# Patient Record
Sex: MALE | Race: WHITE | NOT HISPANIC OR LATINO | Employment: FULL TIME | URBAN - METROPOLITAN AREA
[De-identification: names, ages, dates, MRNs, and addresses within clinical notes are randomized per-mention and may not be internally consistent; named-entity substitution may affect disease eponyms.]

---

## 2017-01-06 ENCOUNTER — TRANSCRIBE ORDERS (OUTPATIENT)
Dept: ADMINISTRATIVE | Facility: HOSPITAL | Age: 57
End: 2017-01-06

## 2017-01-06 ENCOUNTER — LAB (OUTPATIENT)
Dept: LAB | Facility: HOSPITAL | Age: 57
End: 2017-01-06
Attending: FAMILY MEDICINE

## 2017-01-06 DIAGNOSIS — Z00.00 ROUTINE GENERAL MEDICAL EXAMINATION AT A HEALTH CARE FACILITY: Primary | ICD-10-CM

## 2017-01-06 DIAGNOSIS — Z02.89 EXAMINATION, PHYSICAL, EMPLOYEE: Primary | ICD-10-CM

## 2017-01-06 PROCEDURE — 86706 HEP B SURFACE ANTIBODY: CPT

## 2017-01-06 PROCEDURE — 36415 COLL VENOUS BLD VENIPUNCTURE: CPT

## 2017-01-07 LAB — HBV SURFACE AB SER-ACNC: <3.1 MIU/ML

## 2017-02-18 ENCOUNTER — APPOINTMENT (EMERGENCY)
Dept: RADIOLOGY | Facility: HOSPITAL | Age: 57
DRG: 066 | End: 2017-02-18
Payer: COMMERCIAL

## 2017-02-18 ENCOUNTER — GENERIC CONVERSION - ENCOUNTER (OUTPATIENT)
Dept: OTHER | Facility: OTHER | Age: 57
End: 2017-02-18

## 2017-02-18 ENCOUNTER — HOSPITAL ENCOUNTER (INPATIENT)
Facility: HOSPITAL | Age: 57
LOS: 2 days | Discharge: HOME | DRG: 066 | End: 2017-02-20
Attending: EMERGENCY MEDICINE | Admitting: HOSPITALIST
Payer: COMMERCIAL

## 2017-02-18 DIAGNOSIS — G45.9 TIA (TRANSIENT ISCHEMIC ATTACK): Primary | ICD-10-CM

## 2017-02-18 LAB
ALBUMIN SERPL BCP-MCNC: 3.7 G/DL (ref 3.5–5)
ALP SERPL-CCNC: 81 U/L (ref 46–116)
ALT SERPL W P-5'-P-CCNC: 23 U/L (ref 12–78)
ANION GAP SERPL CALCULATED.3IONS-SCNC: 7 MMOL/L (ref 4–13)
APTT PPP: 26 SECONDS (ref 24–36)
AST SERPL W P-5'-P-CCNC: 20 U/L (ref 5–45)
BASOPHILS # BLD AUTO: 0 THOUSANDS/ΜL (ref 0–0.1)
BASOPHILS NFR BLD AUTO: 0 % (ref 0–1)
BILIRUB SERPL-MCNC: 0.6 MG/DL (ref 0.2–1)
BUN SERPL-MCNC: 17 MG/DL (ref 5–25)
CALCIUM SERPL-MCNC: 8.6 MG/DL (ref 8.3–10.1)
CHLORIDE SERPL-SCNC: 104 MMOL/L (ref 100–108)
CO2 SERPL-SCNC: 28 MMOL/L (ref 21–32)
CREAT SERPL-MCNC: 1.27 MG/DL (ref 0.6–1.3)
EOSINOPHIL # BLD AUTO: 0.3 THOUSAND/ΜL (ref 0–0.61)
EOSINOPHIL NFR BLD AUTO: 4 % (ref 0–6)
ERYTHROCYTE [DISTWIDTH] IN BLOOD BY AUTOMATED COUNT: 13.9 % (ref 11.6–15.1)
GFR SERPL CREATININE-BSD FRML MDRD: 58.9 ML/MIN/1.73SQ M
GLUCOSE SERPL-MCNC: 92 MG/DL (ref 65–140)
HCT VFR BLD AUTO: 45.1 % (ref 42–52)
HGB BLD-MCNC: 15.1 G/DL (ref 14–18)
INR PPP: 1.06 (ref 0.86–1.16)
LIPASE SERPL-CCNC: 463 U/L (ref 73–393)
LYMPHOCYTES # BLD AUTO: 2.2 THOUSANDS/ΜL (ref 0.6–4.47)
LYMPHOCYTES NFR BLD AUTO: 33 % (ref 14–44)
MCH RBC QN AUTO: 29.1 PG (ref 27–31)
MCHC RBC AUTO-ENTMCNC: 33.5 G/DL (ref 31.4–37.4)
MCV RBC AUTO: 87 FL (ref 82–98)
MONOCYTES # BLD AUTO: 0.8 THOUSAND/ΜL (ref 0.17–1.22)
MONOCYTES NFR BLD AUTO: 13 % (ref 4–12)
NEUTROPHILS # BLD AUTO: 3.2 THOUSANDS/ΜL (ref 1.85–7.62)
NEUTS SEG NFR BLD AUTO: 49 % (ref 43–75)
NRBC BLD AUTO-RTO: 0 /100 WBCS
PLATELET # BLD AUTO: 193 THOUSANDS/UL (ref 130–400)
PMV BLD AUTO: 7.7 FL (ref 8.9–12.7)
POTASSIUM SERPL-SCNC: 4.1 MMOL/L (ref 3.5–5.3)
PROT SERPL-MCNC: 6.7 G/DL (ref 6.4–8.2)
PROTHROMBIN TIME: 11.1 SECONDS (ref 9.4–11.7)
RBC # BLD AUTO: 5.2 MILLION/UL (ref 4.7–6.1)
SODIUM SERPL-SCNC: 139 MMOL/L (ref 136–145)
TROPONIN I SERPL-MCNC: <0.02 NG/ML
WBC # BLD AUTO: 6.5 THOUSAND/UL (ref 4.8–10.8)

## 2017-02-18 PROCEDURE — 80053 COMPREHEN METABOLIC PANEL: CPT | Performed by: EMERGENCY MEDICINE

## 2017-02-18 PROCEDURE — 93005 ELECTROCARDIOGRAM TRACING: CPT | Performed by: EMERGENCY MEDICINE

## 2017-02-18 PROCEDURE — 70450 CT HEAD/BRAIN W/O DYE: CPT

## 2017-02-18 PROCEDURE — 83690 ASSAY OF LIPASE: CPT | Performed by: EMERGENCY MEDICINE

## 2017-02-18 PROCEDURE — 36415 COLL VENOUS BLD VENIPUNCTURE: CPT | Performed by: EMERGENCY MEDICINE

## 2017-02-18 PROCEDURE — 84484 ASSAY OF TROPONIN QUANT: CPT | Performed by: EMERGENCY MEDICINE

## 2017-02-18 PROCEDURE — 85610 PROTHROMBIN TIME: CPT | Performed by: EMERGENCY MEDICINE

## 2017-02-18 PROCEDURE — 85025 COMPLETE CBC W/AUTO DIFF WBC: CPT | Performed by: EMERGENCY MEDICINE

## 2017-02-18 PROCEDURE — 85730 THROMBOPLASTIN TIME PARTIAL: CPT | Performed by: EMERGENCY MEDICINE

## 2017-02-18 RX ORDER — ASPIRIN 325 MG
325 TABLET, DELAYED RELEASE (ENTERIC COATED) ORAL ONCE
Status: COMPLETED | OUTPATIENT
Start: 2017-02-19 | End: 2017-02-19

## 2017-02-18 RX ORDER — CLOPIDOGREL BISULFATE 75 MG/1
75 TABLET ORAL ONCE
Status: COMPLETED | OUTPATIENT
Start: 2017-02-19 | End: 2017-02-19

## 2017-02-18 RX ADMIN — SODIUM CHLORIDE 500 ML: 0.9 INJECTION, SOLUTION INTRAVENOUS at 23:21

## 2017-02-19 ENCOUNTER — APPOINTMENT (INPATIENT)
Dept: RADIOLOGY | Facility: HOSPITAL | Age: 57
DRG: 066 | End: 2017-02-19
Payer: COMMERCIAL

## 2017-02-19 PROBLEM — G45.9 TIA (TRANSIENT ISCHEMIC ATTACK): Status: ACTIVE | Noted: 2017-02-19

## 2017-02-19 PROBLEM — IMO0001 ELEVATED BP: Status: ACTIVE | Noted: 2017-02-19

## 2017-02-19 PROBLEM — R29.898 WEAKNESS OF HAND: Status: ACTIVE | Noted: 2017-02-19

## 2017-02-19 PROBLEM — D86.9 SARCOIDOSIS: Status: ACTIVE | Noted: 2017-02-19

## 2017-02-19 PROBLEM — N18.9 CKD (CHRONIC KIDNEY DISEASE): Status: ACTIVE | Noted: 2017-02-19

## 2017-02-19 LAB
ALBUMIN SERPL BCP-MCNC: 3.2 G/DL (ref 3.5–5)
ALP SERPL-CCNC: 64 U/L (ref 46–116)
ALT SERPL W P-5'-P-CCNC: 19 U/L (ref 12–78)
ANION GAP SERPL CALCULATED.3IONS-SCNC: 8 MMOL/L (ref 4–13)
AST SERPL W P-5'-P-CCNC: 13 U/L (ref 5–45)
BASOPHILS # BLD AUTO: 0 THOUSANDS/ΜL (ref 0–0.1)
BASOPHILS NFR BLD AUTO: 1 % (ref 0–1)
BILIRUB SERPL-MCNC: 0.5 MG/DL (ref 0.2–1)
BILIRUB UR QL STRIP: NEGATIVE
BUN SERPL-MCNC: 15 MG/DL (ref 5–25)
CALCIUM SERPL-MCNC: 8.1 MG/DL (ref 8.3–10.1)
CHLORIDE SERPL-SCNC: 106 MMOL/L (ref 100–108)
CHOLEST SERPL-MCNC: 136 MG/DL (ref 50–200)
CLARITY UR: CLEAR
CO2 SERPL-SCNC: 26 MMOL/L (ref 21–32)
COLOR UR: YELLOW
CREAT SERPL-MCNC: 1.21 MG/DL (ref 0.6–1.3)
EOSINOPHIL # BLD AUTO: 0.3 THOUSAND/ΜL (ref 0–0.61)
EOSINOPHIL NFR BLD AUTO: 5 % (ref 0–6)
ERYTHROCYTE [DISTWIDTH] IN BLOOD BY AUTOMATED COUNT: 13.9 % (ref 11.6–15.1)
GFR SERPL CREATININE-BSD FRML MDRD: >60 ML/MIN/1.73SQ M
GLUCOSE SERPL-MCNC: 87 MG/DL (ref 65–140)
GLUCOSE UR STRIP-MCNC: NEGATIVE MG/DL
HCT VFR BLD AUTO: 40.9 % (ref 42–52)
HDLC SERPL-MCNC: 40 MG/DL (ref 40–60)
HGB BLD-MCNC: 13.7 G/DL (ref 14–18)
HGB UR QL STRIP.AUTO: NEGATIVE
KETONES UR STRIP-MCNC: NEGATIVE MG/DL
LDLC SERPL CALC-MCNC: 86 MG/DL (ref 0–100)
LEUKOCYTE ESTERASE UR QL STRIP: NEGATIVE
LYMPHOCYTES # BLD AUTO: 1.6 THOUSANDS/ΜL (ref 0.6–4.47)
LYMPHOCYTES NFR BLD AUTO: 29 % (ref 14–44)
MCH RBC QN AUTO: 29 PG (ref 27–31)
MCHC RBC AUTO-ENTMCNC: 33.4 G/DL (ref 31.4–37.4)
MCV RBC AUTO: 87 FL (ref 82–98)
MONOCYTES # BLD AUTO: 0.7 THOUSAND/ΜL (ref 0.17–1.22)
MONOCYTES NFR BLD AUTO: 13 % (ref 4–12)
NEUTROPHILS # BLD AUTO: 3 THOUSANDS/ΜL (ref 1.85–7.62)
NEUTS SEG NFR BLD AUTO: 53 % (ref 43–75)
NITRITE UR QL STRIP: NEGATIVE
NRBC BLD AUTO-RTO: 0 /100 WBCS
PH UR STRIP.AUTO: 6 [PH] (ref 5–9)
PLATELET # BLD AUTO: 163 THOUSANDS/UL (ref 130–400)
PMV BLD AUTO: 8.1 FL (ref 8.9–12.7)
POTASSIUM SERPL-SCNC: 3.8 MMOL/L (ref 3.5–5.3)
PROT SERPL-MCNC: 5.8 G/DL (ref 6.4–8.2)
PROT UR STRIP-MCNC: NEGATIVE MG/DL
RBC # BLD AUTO: 4.71 MILLION/UL (ref 4.7–6.1)
SODIUM SERPL-SCNC: 140 MMOL/L (ref 136–145)
SP GR UR STRIP.AUTO: 1.01 (ref 1–1.03)
TRIGL SERPL-MCNC: 51 MG/DL
TSH SERPL DL<=0.05 MIU/L-ACNC: 2.74 UIU/ML (ref 0.36–3.74)
UROBILINOGEN UR QL STRIP.AUTO: 0.2 E.U./DL
WBC # BLD AUTO: 5.6 THOUSAND/UL (ref 4.8–10.8)

## 2017-02-19 PROCEDURE — 99285 EMERGENCY DEPT VISIT HI MDM: CPT

## 2017-02-19 PROCEDURE — 85025 COMPLETE CBC W/AUTO DIFF WBC: CPT | Performed by: HOSPITALIST

## 2017-02-19 PROCEDURE — G8984 CARRY CURRENT STATUS: HCPCS

## 2017-02-19 PROCEDURE — 84443 ASSAY THYROID STIM HORMONE: CPT | Performed by: HOSPITALIST

## 2017-02-19 PROCEDURE — 70498 CT ANGIOGRAPHY NECK: CPT

## 2017-02-19 PROCEDURE — 70496 CT ANGIOGRAPHY HEAD: CPT

## 2017-02-19 PROCEDURE — 81003 URINALYSIS AUTO W/O SCOPE: CPT | Performed by: EMERGENCY MEDICINE

## 2017-02-19 PROCEDURE — G8985 CARRY GOAL STATUS: HCPCS

## 2017-02-19 PROCEDURE — 87081 CULTURE SCREEN ONLY: CPT | Performed by: HOSPITALIST

## 2017-02-19 PROCEDURE — 97161 PT EVAL LOW COMPLEX 20 MIN: CPT

## 2017-02-19 PROCEDURE — 80053 COMPREHEN METABOLIC PANEL: CPT | Performed by: HOSPITALIST

## 2017-02-19 PROCEDURE — 80061 LIPID PANEL: CPT | Performed by: HOSPITALIST

## 2017-02-19 RX ORDER — ACETAMINOPHEN 325 MG/1
650 TABLET ORAL EVERY 6 HOURS PRN
Status: DISCONTINUED | OUTPATIENT
Start: 2017-02-19 | End: 2017-02-20 | Stop reason: HOSPADM

## 2017-02-19 RX ORDER — ATORVASTATIN CALCIUM 40 MG/1
40 TABLET, FILM COATED ORAL
Status: DISCONTINUED | OUTPATIENT
Start: 2017-02-19 | End: 2017-02-20 | Stop reason: HOSPADM

## 2017-02-19 RX ADMIN — ATORVASTATIN CALCIUM 40 MG: 40 TABLET, FILM COATED ORAL at 16:51

## 2017-02-19 RX ADMIN — ENOXAPARIN SODIUM 40 MG: 40 INJECTION SUBCUTANEOUS at 14:33

## 2017-02-19 RX ADMIN — ASPIRIN 325 MG: 325 TABLET, DELAYED RELEASE ORAL at 00:01

## 2017-02-19 RX ADMIN — CLOPIDOGREL BISULFATE 75 MG: 75 TABLET ORAL at 00:01

## 2017-02-19 RX ADMIN — IOHEXOL 85 ML: 350 INJECTION, SOLUTION INTRAVENOUS at 16:04

## 2017-02-19 RX ADMIN — ACETAMINOPHEN 650 MG: 325 TABLET, FILM COATED ORAL at 23:11

## 2017-02-20 ENCOUNTER — APPOINTMENT (INPATIENT)
Dept: NON INVASIVE DIAGNOSTICS | Facility: HOSPITAL | Age: 57
DRG: 066 | End: 2017-02-20
Payer: COMMERCIAL

## 2017-02-20 ENCOUNTER — APPOINTMENT (INPATIENT)
Dept: RADIOLOGY | Facility: HOSPITAL | Age: 57
DRG: 066 | End: 2017-02-20
Payer: COMMERCIAL

## 2017-02-20 ENCOUNTER — GENERIC CONVERSION - ENCOUNTER (OUTPATIENT)
Dept: OTHER | Facility: OTHER | Age: 57
End: 2017-02-20

## 2017-02-20 VITALS
BODY MASS INDEX: 25.05 KG/M2 | WEIGHT: 184.97 LBS | TEMPERATURE: 98.3 F | RESPIRATION RATE: 18 BRPM | OXYGEN SATURATION: 96 % | SYSTOLIC BLOOD PRESSURE: 137 MMHG | HEIGHT: 72 IN | DIASTOLIC BLOOD PRESSURE: 72 MMHG | HEART RATE: 63 BPM

## 2017-02-20 LAB
ANION GAP SERPL CALCULATED.3IONS-SCNC: 5 MMOL/L (ref 4–13)
BUN SERPL-MCNC: 12 MG/DL (ref 5–25)
CALCIUM SERPL-MCNC: 8.3 MG/DL (ref 8.3–10.1)
CHLORIDE SERPL-SCNC: 105 MMOL/L (ref 100–108)
CO2 SERPL-SCNC: 29 MMOL/L (ref 21–32)
CREAT SERPL-MCNC: 1.21 MG/DL (ref 0.6–1.3)
EST. AVERAGE GLUCOSE BLD GHB EST-MCNC: 103 MG/DL
GFR SERPL CREATININE-BSD FRML MDRD: >60 ML/MIN/1.73SQ M
GLUCOSE SERPL-MCNC: 93 MG/DL (ref 65–140)
HBA1C MFR BLD: 5.2 % (ref 4.2–6.3)
MRSA NOSE QL CULT: NORMAL
POTASSIUM SERPL-SCNC: 3.6 MMOL/L (ref 3.5–5.3)
SODIUM SERPL-SCNC: 139 MMOL/L (ref 136–145)

## 2017-02-20 PROCEDURE — G8987 SELF CARE CURRENT STATUS: HCPCS

## 2017-02-20 PROCEDURE — 97165 OT EVAL LOW COMPLEX 30 MIN: CPT

## 2017-02-20 PROCEDURE — 83036 HEMOGLOBIN GLYCOSYLATED A1C: CPT | Performed by: FAMILY MEDICINE

## 2017-02-20 PROCEDURE — G8988 SELF CARE GOAL STATUS: HCPCS

## 2017-02-20 PROCEDURE — 93306 TTE W/DOPPLER COMPLETE: CPT

## 2017-02-20 PROCEDURE — 80048 BASIC METABOLIC PNL TOTAL CA: CPT | Performed by: FAMILY MEDICINE

## 2017-02-20 PROCEDURE — 97530 THERAPEUTIC ACTIVITIES: CPT

## 2017-02-20 PROCEDURE — 70551 MRI BRAIN STEM W/O DYE: CPT

## 2017-02-20 RX ORDER — ASPIRIN 81 MG/1
81 TABLET, CHEWABLE ORAL DAILY
Qty: 30 TABLET | Refills: 0 | Status: ON HOLD | OUTPATIENT
Start: 2017-02-20 | End: 2017-07-05

## 2017-02-20 RX ORDER — ATORVASTATIN CALCIUM 40 MG/1
40 TABLET, FILM COATED ORAL
Qty: 30 TABLET | Refills: 0 | Status: ON HOLD | OUTPATIENT
Start: 2017-02-20 | End: 2017-07-05

## 2017-02-20 RX ADMIN — ATORVASTATIN CALCIUM 40 MG: 40 TABLET, FILM COATED ORAL at 16:38

## 2017-02-20 RX ADMIN — ENOXAPARIN SODIUM 40 MG: 40 INJECTION SUBCUTANEOUS at 09:13

## 2017-02-21 ENCOUNTER — APPOINTMENT (OUTPATIENT)
Dept: OCCUPATIONAL THERAPY | Facility: CLINIC | Age: 57
End: 2017-02-21
Payer: COMMERCIAL

## 2017-02-21 ENCOUNTER — GENERIC CONVERSION - ENCOUNTER (OUTPATIENT)
Dept: OTHER | Facility: OTHER | Age: 57
End: 2017-02-21

## 2017-02-21 PROCEDURE — 97165 OT EVAL LOW COMPLEX 30 MIN: CPT

## 2017-02-22 ENCOUNTER — GENERIC CONVERSION - ENCOUNTER (OUTPATIENT)
Dept: OTHER | Facility: OTHER | Age: 57
End: 2017-02-22

## 2017-02-22 LAB
ATRIAL RATE: 87 BPM
P AXIS: 53 DEGREES
PR INTERVAL: 168 MS
QRS AXIS: -10 DEGREES
QRSD INTERVAL: 100 MS
QT INTERVAL: 352 MS
QTC INTERVAL: 423 MS
T WAVE AXIS: 9 DEGREES
VENTRICULAR RATE: 87 BPM

## 2017-02-27 ENCOUNTER — ALLSCRIPTS OFFICE VISIT (OUTPATIENT)
Dept: OTHER | Facility: OTHER | Age: 57
End: 2017-02-27

## 2017-02-27 DIAGNOSIS — D86.9 SARCOIDOSIS: ICD-10-CM

## 2017-02-27 DIAGNOSIS — I49.8 OTHER SPECIFIED CARDIAC ARRHYTHMIAS: ICD-10-CM

## 2017-02-27 DIAGNOSIS — I63.9 CEREBRAL INFARCTION (HCC): ICD-10-CM

## 2017-02-28 ENCOUNTER — APPOINTMENT (OUTPATIENT)
Dept: OCCUPATIONAL THERAPY | Facility: CLINIC | Age: 57
End: 2017-02-28
Payer: COMMERCIAL

## 2017-02-28 PROCEDURE — 97110 THERAPEUTIC EXERCISES: CPT

## 2017-03-07 ENCOUNTER — ALLSCRIPTS OFFICE VISIT (OUTPATIENT)
Dept: OTHER | Facility: OTHER | Age: 57
End: 2017-03-07

## 2017-03-07 ENCOUNTER — APPOINTMENT (OUTPATIENT)
Dept: OCCUPATIONAL THERAPY | Facility: CLINIC | Age: 57
End: 2017-03-07
Payer: COMMERCIAL

## 2017-03-07 PROCEDURE — 97110 THERAPEUTIC EXERCISES: CPT

## 2017-03-08 ENCOUNTER — HOSPITAL ENCOUNTER (OUTPATIENT)
Dept: NON INVASIVE DIAGNOSTICS | Facility: CLINIC | Age: 57
Discharge: HOME | End: 2017-03-08
Payer: COMMERCIAL

## 2017-03-08 DIAGNOSIS — I63.9 CEREBRAL INFARCTION (HCC): ICD-10-CM

## 2017-03-08 PROCEDURE — 93226 XTRNL ECG REC<48 HR SCAN A/R: CPT

## 2017-03-08 PROCEDURE — 93225 XTRNL ECG REC<48 HRS REC: CPT

## 2017-03-10 ENCOUNTER — GENERIC CONVERSION - ENCOUNTER (OUTPATIENT)
Dept: OTHER | Facility: OTHER | Age: 57
End: 2017-03-10

## 2017-03-14 ENCOUNTER — APPOINTMENT (OUTPATIENT)
Dept: OCCUPATIONAL THERAPY | Facility: CLINIC | Age: 57
End: 2017-03-14
Payer: COMMERCIAL

## 2017-03-15 ENCOUNTER — ALLSCRIPTS OFFICE VISIT (OUTPATIENT)
Dept: OTHER | Facility: OTHER | Age: 57
End: 2017-03-15

## 2017-03-21 ENCOUNTER — APPOINTMENT (OUTPATIENT)
Dept: OCCUPATIONAL THERAPY | Facility: CLINIC | Age: 57
End: 2017-03-21
Payer: COMMERCIAL

## 2017-03-23 ENCOUNTER — TELEPHONE (OUTPATIENT)
Dept: SURGERY | Facility: HOSPITAL | Age: 57
End: 2017-03-23

## 2017-03-24 ENCOUNTER — HOSPITAL ENCOUNTER (OUTPATIENT)
Dept: NON INVASIVE DIAGNOSTICS | Facility: HOSPITAL | Age: 57
Discharge: HOME | End: 2017-03-24
Attending: INTERNAL MEDICINE
Payer: COMMERCIAL

## 2017-03-24 ENCOUNTER — ANESTHESIA (OUTPATIENT)
Dept: NON INVASIVE DIAGNOSTICS | Facility: HOSPITAL | Age: 57
End: 2017-03-24
Payer: COMMERCIAL

## 2017-03-24 ENCOUNTER — HOSPITAL ENCOUNTER (OUTPATIENT)
Dept: NON INVASIVE DIAGNOSTICS | Facility: HOSPITAL | Age: 57
Discharge: HOME | End: 2017-03-24
Attending: INTERNAL MEDICINE | Admitting: INTERNAL MEDICINE
Payer: COMMERCIAL

## 2017-03-24 ENCOUNTER — ANESTHESIA EVENT (OUTPATIENT)
Dept: NON INVASIVE DIAGNOSTICS | Facility: HOSPITAL | Age: 57
End: 2017-03-24
Payer: COMMERCIAL

## 2017-03-24 VITALS
DIASTOLIC BLOOD PRESSURE: 58 MMHG | TEMPERATURE: 98.1 F | OXYGEN SATURATION: 94 % | WEIGHT: 180 LBS | BODY MASS INDEX: 24.38 KG/M2 | HEIGHT: 72 IN | SYSTOLIC BLOOD PRESSURE: 122 MMHG | HEART RATE: 79 BPM | RESPIRATION RATE: 18 BRPM

## 2017-03-24 VITALS
SYSTOLIC BLOOD PRESSURE: 114 MMHG | TEMPERATURE: 97.2 F | OXYGEN SATURATION: 99 % | RESPIRATION RATE: 18 BRPM | DIASTOLIC BLOOD PRESSURE: 62 MMHG | HEART RATE: 83 BPM

## 2017-03-24 DIAGNOSIS — D86.9 SARCOIDOSIS: ICD-10-CM

## 2017-03-24 DIAGNOSIS — I49.8 OTHER SPECIFIED CARDIAC ARRHYTHMIAS: ICD-10-CM

## 2017-03-24 DIAGNOSIS — I63.9 CEREBRAL INFARCTION (HCC): ICD-10-CM

## 2017-03-24 DIAGNOSIS — I63.9 IMPENDING CEREBROVASCULAR ACCIDENT (HCC): ICD-10-CM

## 2017-03-24 LAB
ANION GAP SERPL CALCULATED.3IONS-SCNC: 6 MMOL/L (ref 4–13)
BUN SERPL-MCNC: 25 MG/DL (ref 5–25)
CALCIUM SERPL-MCNC: 9.1 MG/DL (ref 8.3–10.1)
CHLORIDE SERPL-SCNC: 105 MMOL/L (ref 100–108)
CO2 SERPL-SCNC: 29 MMOL/L (ref 21–32)
CREAT SERPL-MCNC: 1.15 MG/DL (ref 0.6–1.3)
ERYTHROCYTE [DISTWIDTH] IN BLOOD BY AUTOMATED COUNT: 13.3 % (ref 11.6–15.1)
GFR SERPL CREATININE-BSD FRML MDRD: >60 ML/MIN/1.73SQ M
GLUCOSE P FAST SERPL-MCNC: 92 MG/DL (ref 65–99)
GLUCOSE SERPL-MCNC: 92 MG/DL (ref 65–140)
HCT VFR BLD AUTO: 43.5 % (ref 36.5–49.3)
HGB BLD-MCNC: 15.1 G/DL (ref 12–17)
MCH RBC QN AUTO: 30.2 PG (ref 26.8–34.3)
MCHC RBC AUTO-ENTMCNC: 34.7 G/DL (ref 31.4–37.4)
MCV RBC AUTO: 87 FL (ref 82–98)
PLATELET # BLD AUTO: 193 THOUSANDS/UL (ref 149–390)
PMV BLD AUTO: 10.7 FL (ref 8.9–12.7)
POTASSIUM SERPL-SCNC: 4.5 MMOL/L (ref 3.5–5.3)
RBC # BLD AUTO: 5 MILLION/UL (ref 3.88–5.62)
SODIUM SERPL-SCNC: 140 MMOL/L (ref 136–145)
WBC # BLD AUTO: 5.36 THOUSAND/UL (ref 4.31–10.16)

## 2017-03-24 PROCEDURE — 80048 BASIC METABOLIC PNL TOTAL CA: CPT | Performed by: PHYSICIAN ASSISTANT

## 2017-03-24 PROCEDURE — 33282 HB IMPLANT PAT-ACTIVE HT RECORD: CPT | Performed by: INTERNAL MEDICINE

## 2017-03-24 PROCEDURE — C1764 EVENT RECORDER, CARDIAC: HCPCS

## 2017-03-24 PROCEDURE — 93312 ECHO TRANSESOPHAGEAL: CPT

## 2017-03-24 PROCEDURE — 85027 COMPLETE CBC AUTOMATED: CPT | Performed by: PHYSICIAN ASSISTANT

## 2017-03-24 RX ORDER — METOPROLOL SUCCINATE 25 MG/1
25 TABLET, EXTENDED RELEASE ORAL DAILY
COMMUNITY
End: 2018-06-29

## 2017-03-24 RX ORDER — FENTANYL CITRATE 50 UG/ML
INJECTION, SOLUTION INTRAMUSCULAR; INTRAVENOUS AS NEEDED
Status: DISCONTINUED | OUTPATIENT
Start: 2017-03-24 | End: 2017-03-24 | Stop reason: SURG

## 2017-03-24 RX ORDER — PROPOFOL 10 MG/ML
INJECTION, EMULSION INTRAVENOUS CONTINUOUS PRN
Status: DISCONTINUED | OUTPATIENT
Start: 2017-03-24 | End: 2017-03-24 | Stop reason: SURG

## 2017-03-24 RX ORDER — ACETAMINOPHEN 325 MG/1
650 TABLET ORAL EVERY 4 HOURS PRN
Status: DISCONTINUED | OUTPATIENT
Start: 2017-03-24 | End: 2017-03-24 | Stop reason: HOSPADM

## 2017-03-24 RX ORDER — PROPOFOL 10 MG/ML
INJECTION, EMULSION INTRAVENOUS AS NEEDED
Status: DISCONTINUED | OUTPATIENT
Start: 2017-03-24 | End: 2017-03-24 | Stop reason: SURG

## 2017-03-24 RX ORDER — GLYCOPYRROLATE 0.2 MG/ML
INJECTION INTRAMUSCULAR; INTRAVENOUS AS NEEDED
Status: DISCONTINUED | OUTPATIENT
Start: 2017-03-24 | End: 2017-03-24 | Stop reason: SURG

## 2017-03-24 RX ORDER — LIDOCAINE HYDROCHLORIDE 10 MG/ML
INJECTION, SOLUTION INFILTRATION; PERINEURAL CODE/TRAUMA/SEDATION MEDICATION
Status: COMPLETED | OUTPATIENT
Start: 2017-03-24 | End: 2017-03-24

## 2017-03-24 RX ORDER — CLOPIDOGREL BISULFATE 75 MG/1
75 TABLET ORAL DAILY
Status: ON HOLD | COMMUNITY
End: 2017-05-25 | Stop reason: ALTCHOICE

## 2017-03-24 RX ORDER — SODIUM CHLORIDE 9 MG/ML
50 INJECTION, SOLUTION INTRAVENOUS CONTINUOUS
Status: DISCONTINUED | OUTPATIENT
Start: 2017-03-24 | End: 2017-03-24 | Stop reason: HOSPADM

## 2017-03-24 RX ADMIN — SODIUM CHLORIDE 50 ML/HR: 0.9 INJECTION, SOLUTION INTRAVENOUS at 12:27

## 2017-03-24 RX ADMIN — FENTANYL CITRATE 50 MCG: 50 INJECTION, SOLUTION INTRAMUSCULAR; INTRAVENOUS at 14:34

## 2017-03-24 RX ADMIN — LIDOCAINE HYDROCHLORIDE 15 ML: 10 INJECTION, SOLUTION INFILTRATION; PERINEURAL at 15:02

## 2017-03-24 RX ADMIN — PROPOFOL 100 MG: 10 INJECTION, EMULSION INTRAVENOUS at 14:34

## 2017-03-24 RX ADMIN — PROPOFOL 150 MCG/KG/MIN: 10 INJECTION, EMULSION INTRAVENOUS at 14:34

## 2017-03-24 RX ADMIN — GLYCOPYRROLATE 0.2 MG: 0.2 INJECTION INTRAMUSCULAR; INTRAVENOUS at 14:25

## 2017-03-24 RX ADMIN — FENTANYL CITRATE 50 MCG: 50 INJECTION, SOLUTION INTRAMUSCULAR; INTRAVENOUS at 14:25

## 2017-03-28 ENCOUNTER — APPOINTMENT (OUTPATIENT)
Dept: OCCUPATIONAL THERAPY | Facility: CLINIC | Age: 57
End: 2017-03-28
Payer: COMMERCIAL

## 2017-03-29 ENCOUNTER — ALLSCRIPTS OFFICE VISIT (OUTPATIENT)
Dept: OTHER | Facility: OTHER | Age: 57
End: 2017-03-29

## 2017-04-10 ENCOUNTER — ALLSCRIPTS OFFICE VISIT (OUTPATIENT)
Dept: OTHER | Facility: OTHER | Age: 57
End: 2017-04-10

## 2017-05-11 ENCOUNTER — GENERIC CONVERSION - ENCOUNTER (OUTPATIENT)
Dept: OTHER | Facility: OTHER | Age: 57
End: 2017-05-11

## 2017-05-19 ENCOUNTER — ALLSCRIPTS OFFICE VISIT (OUTPATIENT)
Dept: OTHER | Facility: OTHER | Age: 57
End: 2017-05-19

## 2017-05-19 DIAGNOSIS — I34.1 NONRHEUMATIC MITRAL VALVE PROLAPSE: ICD-10-CM

## 2017-05-19 DIAGNOSIS — I34.0 NONRHEUMATIC MITRAL VALVE INSUFFICIENCY: ICD-10-CM

## 2017-05-24 PROBLEM — I34.0 MODERATE MITRAL REGURGITATION: Status: ACTIVE | Noted: 2017-05-24

## 2017-05-25 ENCOUNTER — ANESTHESIA (OUTPATIENT)
Dept: NON INVASIVE DIAGNOSTICS | Facility: HOSPITAL | Age: 57
End: 2017-05-25
Payer: COMMERCIAL

## 2017-05-25 ENCOUNTER — ANESTHESIA EVENT (OUTPATIENT)
Dept: NON INVASIVE DIAGNOSTICS | Facility: HOSPITAL | Age: 57
End: 2017-05-25
Payer: COMMERCIAL

## 2017-05-25 ENCOUNTER — GENERIC CONVERSION - ENCOUNTER (OUTPATIENT)
Dept: OTHER | Facility: OTHER | Age: 57
End: 2017-05-25

## 2017-05-25 ENCOUNTER — HOSPITAL ENCOUNTER (OUTPATIENT)
Dept: NON INVASIVE DIAGNOSTICS | Facility: HOSPITAL | Age: 57
Discharge: HOME | End: 2017-05-25
Attending: INTERNAL MEDICINE | Admitting: INTERNAL MEDICINE
Payer: COMMERCIAL

## 2017-05-25 ENCOUNTER — HOSPITAL ENCOUNTER (OUTPATIENT)
Dept: NON INVASIVE DIAGNOSTICS | Facility: HOSPITAL | Age: 57
Discharge: HOME | End: 2017-05-25
Payer: COMMERCIAL

## 2017-05-25 VITALS
SYSTOLIC BLOOD PRESSURE: 124 MMHG | WEIGHT: 182 LBS | HEIGHT: 72 IN | TEMPERATURE: 98.5 F | DIASTOLIC BLOOD PRESSURE: 74 MMHG | BODY MASS INDEX: 24.65 KG/M2 | OXYGEN SATURATION: 94 % | HEART RATE: 88 BPM | RESPIRATION RATE: 18 BRPM

## 2017-05-25 DIAGNOSIS — I34.1 NONRHEUMATIC MITRAL VALVE PROLAPSE: ICD-10-CM

## 2017-05-25 DIAGNOSIS — I34.0 MODERATE MITRAL REGURGITATION: Primary | ICD-10-CM

## 2017-05-25 DIAGNOSIS — I34.0 NONRHEUMATIC MITRAL VALVE INSUFFICIENCY: ICD-10-CM

## 2017-05-25 LAB
ANION GAP SERPL CALCULATED.3IONS-SCNC: 3 MMOL/L (ref 4–13)
ATRIAL RATE: 63 BPM
BUN SERPL-MCNC: 16 MG/DL (ref 5–25)
CALCIUM SERPL-MCNC: 8.9 MG/DL (ref 8.3–10.1)
CHLORIDE SERPL-SCNC: 104 MMOL/L (ref 100–108)
CHOLEST SERPL-MCNC: 157 MG/DL (ref 50–200)
CO2 SERPL-SCNC: 31 MMOL/L (ref 21–32)
CREAT SERPL-MCNC: 1.15 MG/DL (ref 0.6–1.3)
ERYTHROCYTE [DISTWIDTH] IN BLOOD BY AUTOMATED COUNT: 12.8 % (ref 11.6–15.1)
GFR SERPL CREATININE-BSD FRML MDRD: >60 ML/MIN/1.73SQ M
GLUCOSE P FAST SERPL-MCNC: 89 MG/DL (ref 65–99)
GLUCOSE SERPL-MCNC: 89 MG/DL (ref 65–140)
HCT VFR BLD AUTO: 43.1 % (ref 36.5–49.3)
HDLC SERPL-MCNC: 42 MG/DL (ref 40–60)
HGB BLD-MCNC: 15 G/DL (ref 12–17)
INR PPP: 1.08 (ref 0.86–1.16)
LDLC SERPL CALC-MCNC: 97 MG/DL (ref 0–100)
MAGNESIUM SERPL-MCNC: 2 MG/DL (ref 1.6–2.6)
MCH RBC QN AUTO: 30.7 PG (ref 26.8–34.3)
MCHC RBC AUTO-ENTMCNC: 34.8 G/DL (ref 31.4–37.4)
MCV RBC AUTO: 88 FL (ref 82–98)
P AXIS: 16 DEGREES
PLATELET # BLD AUTO: 178 THOUSANDS/UL (ref 149–390)
PMV BLD AUTO: 10.3 FL (ref 8.9–12.7)
POTASSIUM SERPL-SCNC: 4.8 MMOL/L (ref 3.5–5.3)
PR INTERVAL: 176 MS
PROTHROMBIN TIME: 14 SECONDS (ref 12.1–14.4)
QRS AXIS: -6 DEGREES
QRSD INTERVAL: 102 MS
QT INTERVAL: 394 MS
QTC INTERVAL: 403 MS
RBC # BLD AUTO: 4.89 MILLION/UL (ref 3.88–5.62)
SODIUM SERPL-SCNC: 138 MMOL/L (ref 136–145)
T WAVE AXIS: 1 DEGREES
TRIGL SERPL-MCNC: 88 MG/DL
VENTRICULAR RATE: 63 BPM
WBC # BLD AUTO: 5.25 THOUSAND/UL (ref 4.31–10.16)

## 2017-05-25 PROCEDURE — 83735 ASSAY OF MAGNESIUM: CPT | Performed by: INTERNAL MEDICINE

## 2017-05-25 PROCEDURE — 85027 COMPLETE CBC AUTOMATED: CPT | Performed by: INTERNAL MEDICINE

## 2017-05-25 PROCEDURE — 80061 LIPID PANEL: CPT | Performed by: INTERNAL MEDICINE

## 2017-05-25 PROCEDURE — 99152 MOD SED SAME PHYS/QHP 5/>YRS: CPT | Performed by: PHYSICIAN ASSISTANT

## 2017-05-25 PROCEDURE — 85610 PROTHROMBIN TIME: CPT | Performed by: INTERNAL MEDICINE

## 2017-05-25 PROCEDURE — 99153 MOD SED SAME PHYS/QHP EA: CPT | Performed by: PHYSICIAN ASSISTANT

## 2017-05-25 PROCEDURE — C1769 GUIDE WIRE: HCPCS | Performed by: PHYSICIAN ASSISTANT

## 2017-05-25 PROCEDURE — C1894 INTRO/SHEATH, NON-LASER: HCPCS | Performed by: PHYSICIAN ASSISTANT

## 2017-05-25 PROCEDURE — 93458 L HRT ARTERY/VENTRICLE ANGIO: CPT | Performed by: PHYSICIAN ASSISTANT

## 2017-05-25 PROCEDURE — 76376 3D RENDER W/INTRP POSTPROCES: CPT

## 2017-05-25 PROCEDURE — 80048 BASIC METABOLIC PNL TOTAL CA: CPT | Performed by: INTERNAL MEDICINE

## 2017-05-25 PROCEDURE — 93005 ELECTROCARDIOGRAM TRACING: CPT | Performed by: INTERNAL MEDICINE

## 2017-05-25 PROCEDURE — 93312 ECHO TRANSESOPHAGEAL: CPT

## 2017-05-25 RX ORDER — FENTANYL CITRATE 50 UG/ML
INJECTION, SOLUTION INTRAMUSCULAR; INTRAVENOUS CODE/TRAUMA/SEDATION MEDICATION
Status: COMPLETED | OUTPATIENT
Start: 2017-05-25 | End: 2017-05-25

## 2017-05-25 RX ORDER — SODIUM CHLORIDE 9 MG/ML
100 INJECTION, SOLUTION INTRAVENOUS CONTINUOUS
Status: DISCONTINUED | OUTPATIENT
Start: 2017-05-25 | End: 2017-05-25 | Stop reason: HOSPADM

## 2017-05-25 RX ORDER — VERAPAMIL HYDROCHLORIDE 2.5 MG/ML
INJECTION, SOLUTION INTRAVENOUS CODE/TRAUMA/SEDATION MEDICATION
Status: COMPLETED | OUTPATIENT
Start: 2017-05-25 | End: 2017-05-25

## 2017-05-25 RX ORDER — NITROGLYCERIN 20 MG/100ML
INJECTION INTRAVENOUS CODE/TRAUMA/SEDATION MEDICATION
Status: COMPLETED | OUTPATIENT
Start: 2017-05-25 | End: 2017-05-25

## 2017-05-25 RX ORDER — ASPIRIN 81 MG/1
324 TABLET, CHEWABLE ORAL ONCE
Status: COMPLETED | OUTPATIENT
Start: 2017-05-25 | End: 2017-05-25

## 2017-05-25 RX ORDER — HEPARIN SODIUM 1000 [USP'U]/ML
INJECTION, SOLUTION INTRAVENOUS; SUBCUTANEOUS CODE/TRAUMA/SEDATION MEDICATION
Status: COMPLETED | OUTPATIENT
Start: 2017-05-25 | End: 2017-05-25

## 2017-05-25 RX ORDER — PROPOFOL 10 MG/ML
INJECTION, EMULSION INTRAVENOUS AS NEEDED
Status: DISCONTINUED | OUTPATIENT
Start: 2017-05-25 | End: 2017-05-25 | Stop reason: SURG

## 2017-05-25 RX ORDER — PROPOFOL 10 MG/ML
INJECTION, EMULSION INTRAVENOUS CONTINUOUS PRN
Status: DISCONTINUED | OUTPATIENT
Start: 2017-05-25 | End: 2017-05-25 | Stop reason: SURG

## 2017-05-25 RX ORDER — LIDOCAINE HYDROCHLORIDE 10 MG/ML
INJECTION, SOLUTION INFILTRATION; PERINEURAL CODE/TRAUMA/SEDATION MEDICATION
Status: COMPLETED | OUTPATIENT
Start: 2017-05-25 | End: 2017-05-25

## 2017-05-25 RX ORDER — EPHEDRINE SULFATE 50 MG/ML
INJECTION, SOLUTION INTRAVENOUS AS NEEDED
Status: DISCONTINUED | OUTPATIENT
Start: 2017-05-25 | End: 2017-05-25 | Stop reason: SURG

## 2017-05-25 RX ORDER — MIDAZOLAM HYDROCHLORIDE 1 MG/ML
INJECTION INTRAMUSCULAR; INTRAVENOUS CODE/TRAUMA/SEDATION MEDICATION
Status: COMPLETED | OUTPATIENT
Start: 2017-05-25 | End: 2017-05-25

## 2017-05-25 RX ADMIN — LIDOCAINE HYDROCHLORIDE 100 MG: 20 INJECTION, SOLUTION INTRAVENOUS at 13:17

## 2017-05-25 RX ADMIN — PROPOFOL 120 MCG/KG/MIN: 10 INJECTION, EMULSION INTRAVENOUS at 13:17

## 2017-05-25 RX ADMIN — EPHEDRINE SULFATE 15 MG: 50 INJECTION, SOLUTION INTRAMUSCULAR; INTRAVENOUS; SUBCUTANEOUS at 13:33

## 2017-05-25 RX ADMIN — VERAPAMIL HYDROCHLORIDE 2.5 MG: 2.5 INJECTION, SOLUTION INTRAVENOUS at 16:04

## 2017-05-25 RX ADMIN — MIDAZOLAM HYDROCHLORIDE 1 MG: 1 INJECTION, SOLUTION INTRAMUSCULAR; INTRAVENOUS at 16:05

## 2017-05-25 RX ADMIN — SODIUM CHLORIDE 100 ML/HR: 0.9 INJECTION, SOLUTION INTRAVENOUS at 11:41

## 2017-05-25 RX ADMIN — HEPARIN SODIUM 4000 UNITS: 1000 INJECTION INTRAVENOUS; SUBCUTANEOUS at 16:05

## 2017-05-25 RX ADMIN — TOPICAL ANESTHETIC 1 SPRAY: 200 SPRAY DENTAL; PERIODONTAL at 13:16

## 2017-05-25 RX ADMIN — EPHEDRINE SULFATE 10 MG: 50 INJECTION, SOLUTION INTRAMUSCULAR; INTRAVENOUS; SUBCUTANEOUS at 13:36

## 2017-05-25 RX ADMIN — PROPOFOL 30 MG: 10 INJECTION, EMULSION INTRAVENOUS at 13:20

## 2017-05-25 RX ADMIN — PROPOFOL 150 MG: 10 INJECTION, EMULSION INTRAVENOUS at 13:17

## 2017-05-25 RX ADMIN — NITROGLYCERIN 200 MCG: 20 INJECTION INTRAVENOUS at 16:04

## 2017-05-25 RX ADMIN — IOHEXOL 94 ML: 350 INJECTION, SOLUTION INTRAVENOUS at 16:22

## 2017-05-25 RX ADMIN — PROPOFOL 20 MG: 10 INJECTION, EMULSION INTRAVENOUS at 13:22

## 2017-05-25 RX ADMIN — EPHEDRINE SULFATE 10 MG: 50 INJECTION, SOLUTION INTRAMUSCULAR; INTRAVENOUS; SUBCUTANEOUS at 13:27

## 2017-05-25 RX ADMIN — FENTANYL CITRATE 50 MCG: 50 INJECTION, SOLUTION INTRAMUSCULAR; INTRAVENOUS at 16:13

## 2017-05-25 RX ADMIN — MIDAZOLAM HYDROCHLORIDE 2 MG: 1 INJECTION, SOLUTION INTRAMUSCULAR; INTRAVENOUS at 15:57

## 2017-05-25 RX ADMIN — ASPIRIN 81 MG 324 MG: 81 TABLET ORAL at 11:26

## 2017-05-25 RX ADMIN — LIDOCAINE HYDROCHLORIDE 1 ML: 10 INJECTION, SOLUTION INFILTRATION; PERINEURAL at 16:04

## 2017-05-25 RX ADMIN — FENTANYL CITRATE 50 MCG: 50 INJECTION, SOLUTION INTRAMUSCULAR; INTRAVENOUS at 16:05

## 2017-05-25 RX ADMIN — MIDAZOLAM HYDROCHLORIDE 1 MG: 1 INJECTION, SOLUTION INTRAMUSCULAR; INTRAVENOUS at 16:12

## 2017-05-25 RX ADMIN — PROPOFOL 30 MG: 10 INJECTION, EMULSION INTRAVENOUS at 13:36

## 2017-05-25 RX ADMIN — FENTANYL CITRATE 50 MCG: 50 INJECTION, SOLUTION INTRAMUSCULAR; INTRAVENOUS at 15:57

## 2017-06-01 ENCOUNTER — GENERIC CONVERSION - ENCOUNTER (OUTPATIENT)
Dept: OTHER | Facility: OTHER | Age: 57
End: 2017-06-01

## 2017-06-01 ENCOUNTER — ANESTHESIA EVENT (OUTPATIENT)
Dept: GASTROENTEROLOGY | Facility: HOSPITAL | Age: 57
End: 2017-06-01
Payer: COMMERCIAL

## 2017-06-01 ENCOUNTER — ANESTHESIA (OUTPATIENT)
Dept: GASTROENTEROLOGY | Facility: HOSPITAL | Age: 57
End: 2017-06-01
Payer: COMMERCIAL

## 2017-06-01 ENCOUNTER — HOSPITAL ENCOUNTER (OUTPATIENT)
Facility: HOSPITAL | Age: 57
Setting detail: OUTPATIENT SURGERY
Discharge: HOME | End: 2017-06-01
Attending: INTERNAL MEDICINE | Admitting: INTERNAL MEDICINE
Payer: COMMERCIAL

## 2017-06-01 VITALS
HEART RATE: 51 BPM | BODY MASS INDEX: 24.81 KG/M2 | WEIGHT: 183.13 LBS | SYSTOLIC BLOOD PRESSURE: 109 MMHG | DIASTOLIC BLOOD PRESSURE: 65 MMHG | HEIGHT: 72 IN | RESPIRATION RATE: 18 BRPM | OXYGEN SATURATION: 99 % | TEMPERATURE: 97.4 F

## 2017-06-01 RX ORDER — PROPOFOL 10 MG/ML
INJECTION, EMULSION INTRAVENOUS AS NEEDED
Status: DISCONTINUED | OUTPATIENT
Start: 2017-06-01 | End: 2017-06-01 | Stop reason: SURG

## 2017-06-01 RX ORDER — SODIUM CHLORIDE, SODIUM LACTATE, POTASSIUM CHLORIDE, CALCIUM CHLORIDE 600; 310; 30; 20 MG/100ML; MG/100ML; MG/100ML; MG/100ML
125 INJECTION, SOLUTION INTRAVENOUS CONTINUOUS
Status: DISCONTINUED | OUTPATIENT
Start: 2017-06-01 | End: 2017-06-01 | Stop reason: HOSPADM

## 2017-06-01 RX ADMIN — PROPOFOL 100 MG: 10 INJECTION, EMULSION INTRAVENOUS at 11:06

## 2017-06-01 RX ADMIN — PROPOFOL 80 MG: 10 INJECTION, EMULSION INTRAVENOUS at 10:56

## 2017-06-01 RX ADMIN — SODIUM CHLORIDE, SODIUM LACTATE, POTASSIUM CHLORIDE, AND CALCIUM CHLORIDE 125 ML/HR: .6; .31; .03; .02 INJECTION, SOLUTION INTRAVENOUS at 09:59

## 2017-06-01 RX ADMIN — PROPOFOL 20 MG: 10 INJECTION, EMULSION INTRAVENOUS at 11:09

## 2017-06-01 RX ADMIN — PROPOFOL 50 MG: 10 INJECTION, EMULSION INTRAVENOUS at 11:00

## 2017-06-09 ENCOUNTER — ALLSCRIPTS OFFICE VISIT (OUTPATIENT)
Dept: OTHER | Facility: OTHER | Age: 57
End: 2017-06-09

## 2017-06-14 ENCOUNTER — ALLSCRIPTS OFFICE VISIT (OUTPATIENT)
Dept: OTHER | Facility: OTHER | Age: 57
End: 2017-06-14

## 2017-06-30 ENCOUNTER — APPOINTMENT (OUTPATIENT)
Dept: LAB | Facility: HOSPITAL | Age: 57
End: 2017-06-30
Payer: COMMERCIAL

## 2017-06-30 ENCOUNTER — TRANSCRIBE ORDERS (OUTPATIENT)
Dept: ADMINISTRATIVE | Facility: HOSPITAL | Age: 57
End: 2017-06-30

## 2017-06-30 DIAGNOSIS — Z00.8 HEALTH EXAMINATION IN POPULATION SURVEY: Primary | ICD-10-CM

## 2017-06-30 DIAGNOSIS — Z00.8 HEALTH EXAMINATION IN POPULATION SURVEY: ICD-10-CM

## 2017-06-30 LAB
CHOLEST SERPL-MCNC: 138 MG/DL (ref 50–200)
EST. AVERAGE GLUCOSE BLD GHB EST-MCNC: 105 MG/DL
HBA1C MFR BLD: 5.3 % (ref 4.2–6.3)
HDLC SERPL-MCNC: 36 MG/DL (ref 40–60)
LDLC SERPL CALC-MCNC: 85 MG/DL (ref 0–100)
TRIGL SERPL-MCNC: 85 MG/DL

## 2017-06-30 PROCEDURE — 36415 COLL VENOUS BLD VENIPUNCTURE: CPT | Performed by: PREVENTIVE MEDICINE

## 2017-06-30 PROCEDURE — 83036 HEMOGLOBIN GLYCOSYLATED A1C: CPT | Performed by: PREVENTIVE MEDICINE

## 2017-06-30 PROCEDURE — 80061 LIPID PANEL: CPT

## 2017-07-05 ENCOUNTER — GENERIC CONVERSION - ENCOUNTER (OUTPATIENT)
Dept: OTHER | Facility: OTHER | Age: 57
End: 2017-07-05

## 2017-07-05 ENCOUNTER — HOSPITAL ENCOUNTER (INPATIENT)
Facility: HOSPITAL | Age: 57
LOS: 3 days | Discharge: HOME | DRG: 310 | End: 2017-07-08
Attending: INTERNAL MEDICINE | Admitting: INTERNAL MEDICINE
Payer: COMMERCIAL

## 2017-07-05 LAB
ALBUMIN SERPL BCP-MCNC: 3.5 G/DL (ref 3.5–5)
ALP SERPL-CCNC: 64 U/L (ref 46–116)
ALT SERPL W P-5'-P-CCNC: 19 U/L (ref 12–78)
ANION GAP SERPL CALCULATED.3IONS-SCNC: 5 MMOL/L (ref 4–13)
AST SERPL W P-5'-P-CCNC: 14 U/L (ref 5–45)
BASOPHILS # BLD AUTO: 0.03 THOUSANDS/ΜL (ref 0–0.1)
BASOPHILS NFR BLD AUTO: 1 % (ref 0–1)
BILIRUB SERPL-MCNC: 0.77 MG/DL (ref 0.2–1)
BUN SERPL-MCNC: 14 MG/DL (ref 5–25)
CALCIUM SERPL-MCNC: 9 MG/DL (ref 8.3–10.1)
CHLORIDE SERPL-SCNC: 106 MMOL/L (ref 100–108)
CO2 SERPL-SCNC: 27 MMOL/L (ref 21–32)
CREAT SERPL-MCNC: 1.16 MG/DL (ref 0.6–1.3)
EOSINOPHIL # BLD AUTO: 0.16 THOUSAND/ΜL (ref 0–0.61)
EOSINOPHIL NFR BLD AUTO: 3 % (ref 0–6)
ERYTHROCYTE [DISTWIDTH] IN BLOOD BY AUTOMATED COUNT: 12.9 % (ref 11.6–15.1)
GFR SERPL CREATININE-BSD FRML MDRD: >60 ML/MIN/1.73SQ M
GLUCOSE SERPL-MCNC: 77 MG/DL (ref 65–140)
HCT VFR BLD AUTO: 41.3 % (ref 36.5–49.3)
HGB BLD-MCNC: 14.2 G/DL (ref 12–17)
LYMPHOCYTES # BLD AUTO: 1.21 THOUSANDS/ΜL (ref 0.6–4.47)
LYMPHOCYTES NFR BLD AUTO: 22 % (ref 14–44)
MAGNESIUM SERPL-MCNC: 2.1 MG/DL (ref 1.6–2.6)
MCH RBC QN AUTO: 30.3 PG (ref 26.8–34.3)
MCHC RBC AUTO-ENTMCNC: 34.4 G/DL (ref 31.4–37.4)
MCV RBC AUTO: 88 FL (ref 82–98)
MONOCYTES # BLD AUTO: 0.61 THOUSAND/ΜL (ref 0.17–1.22)
MONOCYTES NFR BLD AUTO: 11 % (ref 4–12)
NEUTROPHILS # BLD AUTO: 3.43 THOUSANDS/ΜL (ref 1.85–7.62)
NEUTS SEG NFR BLD AUTO: 63 % (ref 43–75)
NRBC BLD AUTO-RTO: 0 /100 WBCS
PLATELET # BLD AUTO: 176 THOUSANDS/UL (ref 149–390)
PMV BLD AUTO: 10.2 FL (ref 8.9–12.7)
POTASSIUM SERPL-SCNC: 3.9 MMOL/L (ref 3.5–5.3)
PROT SERPL-MCNC: 6.3 G/DL (ref 6.4–8.2)
RBC # BLD AUTO: 4.69 MILLION/UL (ref 3.88–5.62)
SODIUM SERPL-SCNC: 138 MMOL/L (ref 136–145)
TSH SERPL DL<=0.05 MIU/L-ACNC: 2.11 UIU/ML (ref 0.36–3.74)
WBC # BLD AUTO: 5.45 THOUSAND/UL (ref 4.31–10.16)

## 2017-07-05 PROCEDURE — 80053 COMPREHEN METABOLIC PANEL: CPT | Performed by: PHYSICIAN ASSISTANT

## 2017-07-05 PROCEDURE — 84443 ASSAY THYROID STIM HORMONE: CPT | Performed by: PHYSICIAN ASSISTANT

## 2017-07-05 PROCEDURE — 83735 ASSAY OF MAGNESIUM: CPT | Performed by: PHYSICIAN ASSISTANT

## 2017-07-05 PROCEDURE — 93005 ELECTROCARDIOGRAM TRACING: CPT | Performed by: PHYSICIAN ASSISTANT

## 2017-07-05 PROCEDURE — 85025 COMPLETE CBC W/AUTO DIFF WBC: CPT | Performed by: PHYSICIAN ASSISTANT

## 2017-07-05 RX ORDER — DOFETILIDE 0.25 MG/1
500 CAPSULE ORAL EVERY 12 HOURS SCHEDULED
Status: DISCONTINUED | OUTPATIENT
Start: 2017-07-05 | End: 2017-07-08 | Stop reason: HOSPADM

## 2017-07-05 RX ORDER — FLUTICASONE PROPIONATE 50 MCG
50 SPRAY, SUSPENSION (ML) NASAL DAILY
COMMUNITY

## 2017-07-05 RX ORDER — ASCORBIC ACID 500 MG
1000 TABLET ORAL DAILY
Status: DISCONTINUED | OUTPATIENT
Start: 2017-07-06 | End: 2017-07-08 | Stop reason: HOSPADM

## 2017-07-05 RX ORDER — PNV NO.95/FERROUS FUM/FOLIC AC 28MG-0.8MG
1 TABLET ORAL 3 TIMES WEEKLY
COMMUNITY

## 2017-07-05 RX ORDER — FLUTICASONE PROPIONATE 50 MCG
1 SPRAY, SUSPENSION (ML) NASAL DAILY
Status: DISCONTINUED | OUTPATIENT
Start: 2017-07-06 | End: 2017-07-08 | Stop reason: HOSPADM

## 2017-07-05 RX ORDER — MULTIVIT WITH MINERALS/LUTEIN
1000 TABLET ORAL
COMMUNITY

## 2017-07-05 RX ORDER — METOPROLOL SUCCINATE 25 MG/1
25 TABLET, EXTENDED RELEASE ORAL EVERY EVENING
Status: DISCONTINUED | OUTPATIENT
Start: 2017-07-05 | End: 2017-07-08 | Stop reason: HOSPADM

## 2017-07-05 RX ORDER — VITAMIN E 268 MG
1 CAPSULE ORAL DAILY
COMMUNITY

## 2017-07-05 RX ADMIN — DOFETILIDE 500 MCG: 0.25 CAPSULE ORAL at 17:58

## 2017-07-05 RX ADMIN — APIXABAN 5 MG: 5 TABLET, FILM COATED ORAL at 17:59

## 2017-07-05 RX ADMIN — METOPROLOL SUCCINATE 25 MG: 25 TABLET, EXTENDED RELEASE ORAL at 17:58

## 2017-07-06 ENCOUNTER — GENERIC CONVERSION - ENCOUNTER (OUTPATIENT)
Dept: OTHER | Facility: OTHER | Age: 57
End: 2017-07-06

## 2017-07-06 PROBLEM — Z79.899 VISIT FOR MONITORING TIKOSYN THERAPY: Status: ACTIVE | Noted: 2017-07-06

## 2017-07-06 PROBLEM — I48.0 PAF (PAROXYSMAL ATRIAL FIBRILLATION) (HCC): Status: ACTIVE | Noted: 2017-07-06

## 2017-07-06 PROBLEM — Z51.81 VISIT FOR MONITORING TIKOSYN THERAPY: Status: ACTIVE | Noted: 2017-07-06

## 2017-07-06 LAB
ANION GAP SERPL CALCULATED.3IONS-SCNC: 6 MMOL/L (ref 4–13)
BUN SERPL-MCNC: 13 MG/DL (ref 5–25)
CALCIUM SERPL-MCNC: 8.7 MG/DL (ref 8.3–10.1)
CHLORIDE SERPL-SCNC: 106 MMOL/L (ref 100–108)
CO2 SERPL-SCNC: 29 MMOL/L (ref 21–32)
CREAT SERPL-MCNC: 1.14 MG/DL (ref 0.6–1.3)
GFR SERPL CREATININE-BSD FRML MDRD: >60 ML/MIN/1.73SQ M
GLUCOSE SERPL-MCNC: 92 MG/DL (ref 65–140)
MAGNESIUM SERPL-MCNC: 2.2 MG/DL (ref 1.6–2.6)
POTASSIUM SERPL-SCNC: 3.9 MMOL/L (ref 3.5–5.3)
SODIUM SERPL-SCNC: 141 MMOL/L (ref 136–145)

## 2017-07-06 PROCEDURE — 83735 ASSAY OF MAGNESIUM: CPT | Performed by: PHYSICIAN ASSISTANT

## 2017-07-06 PROCEDURE — 80048 BASIC METABOLIC PNL TOTAL CA: CPT | Performed by: PHYSICIAN ASSISTANT

## 2017-07-06 RX ADMIN — Medication 1000 MG: at 08:27

## 2017-07-06 RX ADMIN — DOFETILIDE 500 MCG: 0.25 CAPSULE ORAL at 06:59

## 2017-07-06 RX ADMIN — APIXABAN 5 MG: 5 TABLET, FILM COATED ORAL at 18:07

## 2017-07-06 RX ADMIN — DOFETILIDE 500 MCG: 0.25 CAPSULE ORAL at 18:07

## 2017-07-06 RX ADMIN — METOPROLOL SUCCINATE 25 MG: 25 TABLET, EXTENDED RELEASE ORAL at 18:07

## 2017-07-06 RX ADMIN — APIXABAN 5 MG: 5 TABLET, FILM COATED ORAL at 08:26

## 2017-07-07 ENCOUNTER — GENERIC CONVERSION - ENCOUNTER (OUTPATIENT)
Dept: OTHER | Facility: OTHER | Age: 57
End: 2017-07-07

## 2017-07-07 LAB
ANION GAP SERPL CALCULATED.3IONS-SCNC: 4 MMOL/L (ref 4–13)
ATRIAL RATE: 62 BPM
BUN SERPL-MCNC: 12 MG/DL (ref 5–25)
CALCIUM SERPL-MCNC: 8.5 MG/DL (ref 8.3–10.1)
CHLORIDE SERPL-SCNC: 105 MMOL/L (ref 100–108)
CO2 SERPL-SCNC: 29 MMOL/L (ref 21–32)
CREAT SERPL-MCNC: 1.08 MG/DL (ref 0.6–1.3)
GFR SERPL CREATININE-BSD FRML MDRD: >60 ML/MIN/1.73SQ M
GLUCOSE SERPL-MCNC: 92 MG/DL (ref 65–140)
P AXIS: 39 DEGREES
POTASSIUM SERPL-SCNC: 3.8 MMOL/L (ref 3.5–5.3)
PR INTERVAL: 176 MS
QRS AXIS: -10 DEGREES
QRSD INTERVAL: 86 MS
QT INTERVAL: 420 MS
QTC INTERVAL: 427 MS
SODIUM SERPL-SCNC: 138 MMOL/L (ref 136–145)
T WAVE AXIS: -6 DEGREES
VENTRICULAR RATE: 62 BPM

## 2017-07-07 PROCEDURE — 93005 ELECTROCARDIOGRAM TRACING: CPT

## 2017-07-07 PROCEDURE — 80048 BASIC METABOLIC PNL TOTAL CA: CPT | Performed by: INTERNAL MEDICINE

## 2017-07-07 RX ADMIN — APIXABAN 5 MG: 5 TABLET, FILM COATED ORAL at 09:22

## 2017-07-07 RX ADMIN — DOFETILIDE 500 MCG: 0.25 CAPSULE ORAL at 18:15

## 2017-07-07 RX ADMIN — FLUTICASONE PROPIONATE 1 SPRAY: 50 SPRAY, METERED NASAL at 09:22

## 2017-07-07 RX ADMIN — Medication 1000 MG: at 09:22

## 2017-07-07 RX ADMIN — APIXABAN 5 MG: 5 TABLET, FILM COATED ORAL at 18:15

## 2017-07-07 RX ADMIN — METOPROLOL SUCCINATE 25 MG: 25 TABLET, EXTENDED RELEASE ORAL at 18:15

## 2017-07-07 RX ADMIN — DOFETILIDE 500 MCG: 0.25 CAPSULE ORAL at 06:14

## 2017-07-08 ENCOUNTER — GENERIC CONVERSION - ENCOUNTER (OUTPATIENT)
Dept: OTHER | Facility: OTHER | Age: 57
End: 2017-07-08

## 2017-07-08 VITALS
BODY MASS INDEX: 24.99 KG/M2 | SYSTOLIC BLOOD PRESSURE: 112 MMHG | HEIGHT: 72 IN | OXYGEN SATURATION: 97 % | TEMPERATURE: 97.5 F | DIASTOLIC BLOOD PRESSURE: 57 MMHG | WEIGHT: 184.53 LBS | HEART RATE: 57 BPM | RESPIRATION RATE: 12 BRPM

## 2017-07-08 LAB
ATRIAL RATE: 67 BPM
P AXIS: 43 DEGREES
PR INTERVAL: 176 MS
QRS AXIS: -6 DEGREES
QRSD INTERVAL: 90 MS
QT INTERVAL: 426 MS
QTC INTERVAL: 450 MS
T WAVE AXIS: -2 DEGREES
VENTRICULAR RATE: 67 BPM

## 2017-07-08 RX ORDER — DOFETILIDE 0.5 MG/1
500 CAPSULE ORAL EVERY 12 HOURS SCHEDULED
Qty: 60 CAPSULE | Refills: 6 | Status: SHIPPED | OUTPATIENT
Start: 2017-07-08 | End: 2018-02-01 | Stop reason: SDUPTHER

## 2017-07-08 RX ADMIN — FLUTICASONE PROPIONATE 1 SPRAY: 50 SPRAY, METERED NASAL at 08:29

## 2017-07-08 RX ADMIN — DOFETILIDE 500 MCG: 0.25 CAPSULE ORAL at 06:12

## 2017-07-08 RX ADMIN — APIXABAN 5 MG: 5 TABLET, FILM COATED ORAL at 08:29

## 2017-07-08 RX ADMIN — Medication 1000 MG: at 08:29

## 2017-07-10 ENCOUNTER — GENERIC CONVERSION - ENCOUNTER (OUTPATIENT)
Dept: OTHER | Facility: OTHER | Age: 57
End: 2017-07-10

## 2017-07-12 ENCOUNTER — ALLSCRIPTS OFFICE VISIT (OUTPATIENT)
Dept: OTHER | Facility: OTHER | Age: 57
End: 2017-07-12

## 2017-07-14 ENCOUNTER — TRANSCRIBE ORDERS (OUTPATIENT)
Dept: ADMINISTRATIVE | Facility: HOSPITAL | Age: 57
End: 2017-07-14

## 2017-07-14 DIAGNOSIS — I48.0 PAROXYSMAL ATRIAL FIBRILLATION (HCC): Primary | ICD-10-CM

## 2017-07-27 ENCOUNTER — ALLSCRIPTS OFFICE VISIT (OUTPATIENT)
Dept: OTHER | Facility: OTHER | Age: 57
End: 2017-07-27

## 2017-07-31 ENCOUNTER — HOSPITAL ENCOUNTER (OUTPATIENT)
Dept: SLEEP CENTER | Facility: CLINIC | Age: 57
Discharge: HOME | End: 2017-07-31
Payer: COMMERCIAL

## 2017-07-31 DIAGNOSIS — I48.0 PAROXYSMAL ATRIAL FIBRILLATION (HCC): ICD-10-CM

## 2017-07-31 PROCEDURE — 95810 POLYSOM 6/> YRS 4/> PARAM: CPT

## 2017-08-30 ENCOUNTER — ALLSCRIPTS OFFICE VISIT (OUTPATIENT)
Dept: OTHER | Facility: OTHER | Age: 57
End: 2017-08-30

## 2017-08-30 DIAGNOSIS — I48.0 PAROXYSMAL ATRIAL FIBRILLATION (HCC): ICD-10-CM

## 2017-09-08 ENCOUNTER — APPOINTMENT (OUTPATIENT)
Dept: LAB | Facility: CLINIC | Age: 57
End: 2017-09-08
Payer: COMMERCIAL

## 2017-09-08 DIAGNOSIS — I48.0 PAROXYSMAL ATRIAL FIBRILLATION (HCC): ICD-10-CM

## 2017-09-08 LAB
ANION GAP SERPL CALCULATED.3IONS-SCNC: 7 MMOL/L (ref 4–13)
BUN SERPL-MCNC: 20 MG/DL (ref 5–25)
CALCIUM SERPL-MCNC: 8.8 MG/DL (ref 8.3–10.1)
CHLORIDE SERPL-SCNC: 105 MMOL/L (ref 100–108)
CO2 SERPL-SCNC: 28 MMOL/L (ref 21–32)
CREAT SERPL-MCNC: 1.2 MG/DL (ref 0.6–1.3)
GFR SERPL CREATININE-BSD FRML MDRD: 67 ML/MIN/1.73SQ M
GLUCOSE SERPL-MCNC: 84 MG/DL (ref 65–140)
MAGNESIUM SERPL-MCNC: 1.9 MG/DL (ref 1.6–2.6)
POTASSIUM SERPL-SCNC: 4 MMOL/L (ref 3.5–5.3)
SODIUM SERPL-SCNC: 140 MMOL/L (ref 136–145)

## 2017-09-08 PROCEDURE — 83735 ASSAY OF MAGNESIUM: CPT

## 2017-09-08 PROCEDURE — 80048 BASIC METABOLIC PNL TOTAL CA: CPT

## 2017-09-08 PROCEDURE — 36415 COLL VENOUS BLD VENIPUNCTURE: CPT

## 2017-09-28 ENCOUNTER — GENERIC CONVERSION - ENCOUNTER (OUTPATIENT)
Dept: OTHER | Facility: OTHER | Age: 57
End: 2017-09-28

## 2017-10-11 ENCOUNTER — TRANSCRIBE ORDERS (OUTPATIENT)
Dept: ADMINISTRATIVE | Facility: HOSPITAL | Age: 57
End: 2017-10-11

## 2017-10-11 DIAGNOSIS — G47.33 OBSTRUCTIVE SLEEP APNEA SYNDROME: Primary | ICD-10-CM

## 2017-10-26 ENCOUNTER — HOSPITAL ENCOUNTER (OUTPATIENT)
Dept: SLEEP CENTER | Facility: CLINIC | Age: 57
Discharge: HOME | End: 2017-10-26
Payer: COMMERCIAL

## 2017-10-26 DIAGNOSIS — G47.33 OBSTRUCTIVE SLEEP APNEA SYNDROME: ICD-10-CM

## 2017-10-26 PROCEDURE — 95811 POLYSOM 6/>YRS CPAP 4/> PARM: CPT

## 2017-10-31 ENCOUNTER — ALLSCRIPTS OFFICE VISIT (OUTPATIENT)
Dept: OTHER | Facility: OTHER | Age: 57
End: 2017-10-31

## 2017-11-18 ENCOUNTER — TRANSCRIBE ORDERS (OUTPATIENT)
Dept: SLEEP CENTER | Facility: CLINIC | Age: 57
End: 2017-11-18

## 2017-11-18 ENCOUNTER — HOSPITAL ENCOUNTER (OUTPATIENT)
Dept: SLEEP CENTER | Facility: CLINIC | Age: 57
Discharge: HOME | End: 2017-11-18
Payer: COMMERCIAL

## 2017-11-18 DIAGNOSIS — G47.33 OSA (OBSTRUCTIVE SLEEP APNEA): Primary | ICD-10-CM

## 2017-11-20 ENCOUNTER — TRANSCRIBE ORDERS (OUTPATIENT)
Dept: SLEEP CENTER | Facility: CLINIC | Age: 57
End: 2017-11-20

## 2017-12-06 ENCOUNTER — ALLSCRIPTS OFFICE VISIT (OUTPATIENT)
Dept: OTHER | Facility: OTHER | Age: 57
End: 2017-12-06

## 2017-12-07 NOTE — PROGRESS NOTES
Assessment  Assessed    1  Anticoagulant long-term use (V58 61) (Z79 01)   2  Aortic root dilatation (447 71) (I77 810)   3  Benign essential hypertension (401 1) (I10)   4  Mitral valve prolapse (424 0) (I34 1)   5  Moderate mitral regurgitation (424 0) (I34 0)   6  Paroxysmal atrial fibrillation (427 31) (I48 0)   7  TIA (transient ischemic attack) (435 9) (G45 9)    Plan  Aortic root dilatation, Mitral valve prolapse, Moderate mitral regurgitation    · ECHO COMPLETE WITH CONTRAST IF INDICATED; Status:Need Information - FinancialAuthorization; Requested YSJ:[id number]; Perform:[facility]; JASON:[id number];WXSPASJ;XQNB dilatation, Mitral valve prolapse, Moderate mitral regurgitation; Ordered By:Yasir Casanova;   · Follow-up visit in 6 months Evaluation and Treatment  Follow-up  Status: Hold For -Scheduling  Requested for: 22TFS7631   Ordered; For: Aortic root dilatation, Mitral valve prolapse, Moderate mitral regurgitation; Ordered By: Saeed Lazaro Performed:  Due: 76FAK7197  Benign essential hypertension, Health Maintenance, Paroxysmal atrial fibrillation    · EKG/ECG- POC; Status:Complete;   Done: 15NVY9521   Perform: In Office; 350 [id number]; Last Updated [place]; 12/6/2017 3:36:44 PM;Ordered;essential hypertension, Health Maintenance, Paroxysmal atrial fibrillation; Ordered By:Yasir Casanova;  Mitral valve prolapse, Moderate mitral regurgitation    · STRESS TEST ONLY, EXERCISE; Status:Hold For - Scheduling; Requestedfor:62Lfn0476; Perform:[facility]; UCN:31XOR9191;JTFZEXG; For:Mitral valve prolapse, Moderate mitral regurgitation; Ordered By:Yasir Casanova;  Paroxysmal atrial fibrillation    · Dofetilide 500 MCG Oral Capsule (Tikosyn); TAKE 1 CAPSULE TWICE DAILY   Rx By: Saeed Lazaro; Dispense: 30 Days ; #:60 Capsule;  Refill: 0;For: Paroxysmal atrial fibrillation; ERICA = N; Verified Transmission to [facility]; Last Updated By: [employer]; 12/6/2017 3:39:26 PM   · Eliquis 5 MG Oral Tablet; Take 1 tablet twice daily   Rx By: Evangelina Casper; Dispense: 30 Days ; #:60 Tablet; Refill: 6;Paroxysmal atrial fibrillation; ERICA = N; Verified Transmission to [address]; Last Updated By: System, [employer]; 12/6/2017 3:39:27 PM    Discussion/Summary  Cardiology Discussion Summary Free Text Note Form [facility]:   Dear Dr Lynne Murrieta,  had the pleasure of having Teofilo Lundy follow-up at the [facility] for his AFib and mitral valve prolapse  - non recurrence  Cardioembolic  On Eliquis  Atrial fibrillation - No recurrence, has LINQ, on rhythm control strategy with Tikosyn  valve prolapse - moderate by HOPE with bileaflet prolapse A2/P2  Walking extensively and no HO- well controlled  normal with baseline LDL 80  No CAD on recent cathaortic root - 42 mm by echocardiogram 2/17  Wytheodore Lundy appears very well and stable from a cardiovascular standpoint have recommended an objective test of exertional tolerance based on his moderate mitral valve regurgitation  This will be a treadmill stress test in 6 months  his moderate mitral regurgitation and mildly dilated aortic root, repeat echocardiogram in 6 months  Chief Complaint  Chief Complaint Free Text Note Form: Patient is here today for 4 mo f/u  Patient has no cardiac complaints at this time  EKG today  Chief Complaint Chronic Condition [facility]: Patient is here today for follow up of chronic conditions described in HPI  History of Present Illness  Cardiology HPI Free Text Note Form [facility]: Wytheodore Lundy feels well  He denies any major cardiac symptoms  He has gained about 8 minutes 10 lb, he will disappointed in that, may be medication related  He has a history of TIA/CVA, cardioembolic and is on Eliquis  No recurrence  He has paroxysmal atrial fibrillation, he was placed on Tikosyn, has not had any recurrence and has a LINQ in place  His blood pressure is well controlled   His lipids are normal and he has prior cardiac catheterization revealed no CAD  He has moderate mitral valve regurgitation with bileaflet prolapse of A2 and P2  Active Problems  Problems    1  Allergic rhinitis (477 9) (J30 9)   2  Anticoagulant long-term use (V58 61) (Z79 01)   3  Aortic root dilatation (447 71) (I77 810)   4  Benign essential hypertension (401 1) (I10)   5  Cerebrovascular accident (CVA), unspecified mechanism (434 91) (I63 9)   6  CKD (chronic kidney disease) (585 9) (N18 9)   7  Colon cancer screening (V76 51) (Z12 11)   8  Erectile dysfunction of non-organic origin (302 72) (F52 21)   9  Fluttering heart (427 42) (I49 8)   10  Left sided lacunar stroke (434 91) (I63 9)   11  Mitral valve prolapse (424 0) (I34 1)   12  Moderate mitral regurgitation (424 0) (I34 0)   13  Obstructive sleep apnea (327 23) (G47 33)   14  Paroxysmal atrial fibrillation (427 31) (I48 0)   15  Sarcoidosis (135) (D86 9)   16  TIA (transient ischemic attack) (435 9) (G45 9)    Past Medical History  Problems    1  History of Blood pressure elevated (401 9) (I10)   2  History of Cervical lymphadenopathy (785 6) (R59 0)   3  History of allergic rhinitis (V12 69) (Z87 09)   4  History of asthma (V12 69) (Z87 09)   5  History of hypercalcemia (V12 29) (Z86 39)   6  History of hypertension (V12 59) (Z86 79)   7  History of shortness of breath (V13 89) (Z87 898)   8  History of Lymphadenopathy, mediastinal (785 6) (R59 0)   9  History of Palpitations (785 1) (R00 2)   10  History of Renal Insufficiency (593 9)  Active Problems And Past Medical History Reviewed: The active problems and past medical history were reviewed and updated today  Surgical History  Problems    1  History of Cataract Surgery   2  History of Inguinal Hernia Repair   3  History of Nasal Septal Deviation Repair   4  History of Oral Surgery Tooth Extraction   5  History of Tonsillectomy    Family History  Mother    1  Denied: Family history of Colon cancer   2   Family history of Congestive Heart Failure   3  Family history of Diabetes Mellitus (V18 0)   4  Denied: Family history of colonic polyps   5  Denied: Family history of Crohn's disease   6  Denied: Family history of liver disease  Father    9  Family history of Cerebral Artery Aneurysm   8  Denied: Family history of Colon cancer   9  Denied: Family history of colonic polyps   10  Denied: Family history of Crohn's disease   6  Denied: Family history of liver disease  Sister    15  Family history of Diabetes Mellitus (V18 0)    Social History  Problems    · Activities of daily living (ADL's), independent   · Being A Social Drinker   · Currently works full-time (V49 89) (Z78 9)   · Denied: History of Drug Use   · Never A Smoker  Social History Reviewed: The social history was reviewed and updated today  The social history was reviewed and is unchanged  Current Meds   1  Biotin CAPS; take 1 capsule daily; Therapy: (Recorded:19May2017) to Recorded   2  Eliquis 5 MG Oral Tablet; Take 1 tablet twice daily; Therapy: 48SZC1351 to (Evaluate:29Ior8164)  Requested for: 65DMC4883; Last Rx:12May2017 Ordered   3  Fluticasone Propionate 50 MCG/ACT Nasal Suspension; USE 2 SPRAYS IN EACH NOSTRIL ONCE DAILY Recorded   4  Glucosamine CAPS; TAKE 1 CAPSULE; Therapy: (Recorded:19May2017) to Recorded   5  Iron 325 (65 Fe) MG Oral Tablet; 1 tablet qod; Therapy: (Recorded:15Mar2017) to Recorded   6  Metoprolol Succinate ER 25 MG Oral Tablet Extended Release 24 Hour; Take 1 tablet daily; Therapy: 10FXQ2746 to (Malik Amezcua)  Requested for: 69Grc1059; Last Rx:43Ttx0782 Ordered   7  Tikosyn 500 MCG Oral Capsule; TAKE 1 CAPSULE TWICE DAILY; Therapy: 52YBN8461 to Recorded   8  Vitamin B Complex Oral Tablet; Therapy: (Recorded:11Nov2015) to Recorded   9  Vitamin C 1000 MG Oral Tablet; TAKE 1 TABLET DAILY; Therapy: (Recorded:19May2017) to Recorded   10  Vitamin E 400 UNIT Oral Capsule; take 1 capsule daily;   Therapy: (Recorded:19May2017) to Recorded  Medication List Reviewed: The medication list was reviewed and updated today  Allergies  Medication    1  No Known Drug Allergies    Vitals  Vital Signs    Recorded: 90APJ4787 03:32PM   Heart Rate 61, Apical   Systolic 823, LUE, Sitting   Diastolic 84, LUE, Sitting   Height 5 ft 11 in   Weight 196 lb 7 oz   BMI Calculated 27 4   BSA Calculated 2 09       Physical Exam   Constitutional  General appearance: No acute distress, well appearing and well nourished  Eyes  Conjunctiva and Sclera examination: Conjunctiva pink, sclera anicteric  Ears, Nose, Mouth, and Throat - Oropharynx: Clear, nares are clear, mucous membranes are moist   Neck  Neck and thyroid: Normal, supple, trachea midline, no thyromegaly  Pulmonary  Respiratory effort: No increased work of breathing or signs of respiratory distress  Auscultation of lungs: Clear to auscultation, no rales, no rhonchi, no wheezing, good air movement  Cardiovascular  Auscultation of heart: Normal rate and rhythm, normal S1 and S2, no murmurs  Carotid pulses: Normal, 2+ bilaterally  Peripheral vascular exam: Normal pulses throughout, no tenderness, erythema or swelling  Pedal pulses: Normal, 2+ bilaterally  Examination of extremities for edema and/or varicosities: Normal    Abdomen  Abdomen: Non-tender and no distention  Liver and spleen: No hepatomegaly or splenomegaly  Musculoskeletal Gait and station: Normal gait  -- Digits and nails: Normal without clubbing or cyanosis  -- Inspection/palpation of joints, bones, and muscles: Normal, ROM normal    Skin - Skin and subcutaneous tissue: Normal without rashes or lesions  Skin is warm and well perfused, normal turgor  Neurologic - Cranial nerves: II - XII intact  -- Speech: Normal    Psychiatric - Orientation to person, place, and time: Normal -- Mood and affect: Normal       Results/Data  Diagnostic Studies Reviewed Cardio:  Echocardiogram/HOPE: 2/17 - mild LVH, grade 1 diastolic dysfunction, EF 63-70%, mildly dilated left atrium, dilated aortic root 42 mm3D HOPE - EF 55%, no LVH, grade 1 diastolic dysfunction, mildly dilated left atrium, mild late systolic prolapse of A2 and P2 with mild to moderate late systolic regurgitation, trace AI, trace PI    ECG Report: 7/17 - normal sinus rhythm, normal QTC12/17-normal sinus rhythm, normal QTC      Health Management  Colon cancer screening   COLONOSCOPY (GI, SURG); every 10 years; Last 78KYR5895; Next Due: 01Jun2027;  Active    Future Appointments    Date/Time Provider Specialty Site   01/31/2018 10:00 AM Cardiology, Device Remote   [address]   05/10/2018 10:30 AM Cardiology, Device Remote   [address]   08/22/2018 11:00 AM Cardiology, Device Remote   [address]       Signatures   Electronically signed by : Rachael Christie MD; Dec  6 2017  4:15PM EST                       (Author)

## 2018-01-10 NOTE — RESULT NOTES
Verified Results  (1) CBC/PLT/DIFF 14VUN5029 09:52AM [facility]   TW Order Number: [id number]  TW Order Number: [id number]     Test Name Result Flag Reference   WBC COUNT 5 94 Thousand/uL  4 31-10 16   RBC COUNT 4 93 Million/uL  3 88-5 62   HEMOGLOBIN 14 5 g/dL  12 0-17 0   HEMATOCRIT 42 4 %  36 5-49 3   MCV 86 fL  82-98   MCH 29 4 pg  26 8-34 3   MCHC 34 2 g/dL  31 4-37 4   RDW 13 6 %  11 6-15 1   MPV 9 6 fL  8 9-12 7   PLATELET COUNT 701 Thousands/uL  149-390   NEUTROPHILS RELATIVE PERCENT 56 %  43-75   LYMPHOCYTES RELATIVE PERCENT 24 %  14-44   MONOCYTES RELATIVE PERCENT 13 % H 4-12   EOSINOPHILS RELATIVE PERCENT 6 %  0-6   BASOPHILS RELATIVE PERCENT 1 %  0-1   NEUTROPHILS ABSOLUTE COUNT 3 33 Thousands/?L  1 85-7 62   LYMPHOCYTES ABSOLUTE COUNT 1 44 Thousands/?L  0 60-4 47   MONOCYTES ABSOLUTE COUNT 0 78 Thousand/?L  0 17-1 22   EOSINOPHILS ABSOLUTE COUNT 0 34 Thousand/?L  0 00-0 61   BASOPHILS ABSOLUTE COUNT 0 05 Thousands/?L  0 00-0 10     (1) COMPREHENSIVE METABOLIC PANEL 55JJV1000 89:39GM [facility]   TW Order Number: [id number]  TW Order Number: [id number] Order Number: [id number] Order Number: [id number]     Test Name Result Flag Reference   GLUCOSE,RANDM 91 mg/dL     If the patient is fasting, the ADA then defines impaired fasting glucose as > 100 mg/dL and diabetes as > or equal to 123 mg/dL     SODIUM 138 mmol/L  136-145   POTASSIUM 4 5 mmol/L  3 5-5 3   CHLORIDE 103 mmol/L  100-108   CARBON DIOXIDE 30 mmol/L  21-32   ANION GAP (CALC) 5 mmol/L  4-13   BLOOD UREA NITROGEN 17 mg/dL  5-25   CREATININE 1 40 mg/dL H 0 60-1 30   Standardized to IDMS reference method   CALCIUM 8 3 mg/dL  8 3-10 1   BILI, TOTAL 0 60 mg/dL  0 20-1 00   ALK PHOSPHATAS 91 U/L     ALT (SGPT) 22 U/L  12-78   AST(SGOT) 21 U/L  5-45   ALBUMIN 3 6 g/dL  3 5-5 0   TOTAL PROTEIN 6 8 g/dL  6 4-8 2   eGFR Non- 52 8 ml/min/1 73sq [facility] Disease Education Program recommendations are as follows:  GFR calculation is accurate only with a steady state creatinine  Chronic Kidney disease less than 60 ml/min/1 73 sq  meters  Kidney failure less than 15 ml/min/1 73 sq  meters  (1) LIPID PANEL, FASTING 15RJA9424 09:52AM Puneet Dee    Order Number: [id number]  TW Order Number: [id number] Order Number: [id number] Order Number: [id number]     Test Name Result Flag Reference   CHOLESTEROL 140 mg/dL     HDL,DIRECT 40 mg/dL  40-60   Specimen collection should occur prior to Metamizole administration due to the potential for falsely depressed results  LDL CHOLESTEROL CALCULATED 87 mg/dL  0-100   Triglyceride:         Normal              <150 mg/dl       Borderline High    150-199 mg/dl       High               200-499 mg/dl       Very High          >499 mg/dl  Cholesterol:         Desirable        <200 mg/dl      Borderline High  200-239 mg/dl      High             >239 mg/dl  HDL Cholesterol:        High    >59 mg/dL      Low     <41 mg/dL  LDL CALCULATED:    This screening LDL is a calculated result  It does not have the accuracy of the Direct Measured LDL in the monitoring of patients with hyperlipidemia and/or statin therapy  Direct Measure LDL (CBE206) must be ordered separately in these patients  TRIGLYCERIDES 64 mg/dL  <=150   Specimen collection should occur prior to N-Acetylcysteine or Metamizole administration due to the potential for falsely depressed results       (1) TSH 95FIQ7615 09:52AM Puneet Dee   TW Order Number: [id number]  TW Order Number: [id number] Order Number: [id number] Order Number: [id number]     Test Name Result Flag Reference   TSH 3 051 uIU/mL  0 358-3 740     (1) URINALYSIS (will reflex a microscopy if leukocytes, occult blood, protein or nitrites are not within normal limits) 48XBE1124 09:52AM Abdelrahman Loyola    Order Number: [id number]     Test Name Result Flag Reference   COLOR Yellow     CLARITY Clear     SPECIFIC GRAVITY UA 1 020  1 003-1 030   PH UA 6 0  4 5-8 0   LEUKOCYTE ESTERASE UA Negative  Negative   NITRITE UA Negative  Negative   PROTEIN UA Negative mg/dl  Negative   GLUCOSE UA Negative mg/dl  Negative   KETONES UA Negative mg/dl  Negative   UROBILINOGEN UA 0 2 E U /dl  0 2, 1 0 E U /dl   BILIRUBIN UA Negative  Negative   BLOOD UA Negative  Negative     * XR CHEST PA & LATERAL 62XBR7162 09:49AM Abdelrahman Loyola   TW Order Number: [id number]     Test Name Result Flag Reference   XR CHEST PA & LATERAL (Report)     CHEST - DUAL ENERGY     INDICATION: History of sarcoidosis     COMPARISON: July 27, 2015     VIEWS: PA (including soft tissue/bone algorithms) and lateral projections; 4 images     FINDINGS:        The heart mediastinum are within normal limits  Hilar regions were unremarkable  No gross evidence of adenopathy seen on this examination  The lungs are clear  No pneumothorax or pleural effusion  Visualized osseous structures appear within normal limits for the patient's age  IMPRESSION:     No active pulmonary disease  Workstation performed: [id number]     Signed by:   Aleksandr Bonner MD   5/20/16       Plan  Sarcoidosis    · (1) BASIC METABOLIC PROFILE; Status:Active;  Requested for:96Qtw8739;

## 2018-01-10 NOTE — PROCEDURES
Procedures by Jaz Mims MD at 5/25/2017   1:57 PM      Author:  Jaz Mims MD Service:  Cardiology Author Type:  Fellow    Filed:  5/25/2017  2:00 PM Date of Service:  5/25/2017  1:57 PM Status:  Attested    :  Jaz Mims MD (Fellow)  Cosigner:  Linda Hoang MD at 6/9/2017 12:00 PM      Procedures:       1  HOPE [HPM11 (Custom)]              Attestation signed by Linda Hoang MD at 6/9/2017 12:00 PM                  Teaching Physician Statement  I performed history and exam of patient  I discussed with the Resident  I agree with the resident's documented findings and plan of care  HOPE performed under conscious sedation (provided by anaesthetist)  for evaluation of mitral regurgitation  Intubation mildly difficult  Attempts 2  No blood on probe  Benzocaine 2%x1     Preliminary report  Normal LV size  Low normal LV function  Mild- moderate late mitral regurgitation    Full report to follow           Received for:Aiyana Gee MD  Jun 9 2017 12:00PM [facility] Standard Time

## 2018-01-10 NOTE — RESULT NOTES
Verified Results  HOLTER MONITOR - 24 HOUR [id number] 11:07AM Lambert Jasso Order Number: [id number]    - Patient Instructions: To schedule this appointment, please contact Central Scheduling at [phone]  Test Name Result Flag Reference   HOLTER MONITOR - 24 HOUR (Report)     INDICATIONS: CVA     DESCRIPTION OF FINDINGS:   The patient was monitored for a total of 24 hours  The patient was predominantly noted to be in Sinus rhythm throughout the study  The average heart rate was 76 beats per minute  The heart rate ranged from a low of 53 beats per minute in Sinus    bradycardia at 1:40 PM to a maximum of 126 beats per minute in Sinus tachycardia at 10:40 PM      Ventricular ectopic activity consisted of 261 beats predominantly isolated PVCs with a few periods of bigeminy (0 2% of total beats)  There was no sustained or nonsustained ventricular tachycardia  Supraventricular ectopic activity consisted of 22 isolated PACs  There was no supraventricular tachycardia identified  There was no evidence of atrial fibrillation or atrial flutter  There were no significant pauses  The longest R-R interval was 1 5 seconds  There was no evidence of advanced degree heart block  There Was no diary  1  Sinus rhythm with occasional PVCs noted

## 2018-01-12 VITALS
DIASTOLIC BLOOD PRESSURE: 78 MMHG | HEART RATE: 65 BPM | WEIGHT: 187 LBS | OXYGEN SATURATION: 98 % | BODY MASS INDEX: 25.36 KG/M2 | SYSTOLIC BLOOD PRESSURE: 110 MMHG | TEMPERATURE: 97.1 F

## 2018-01-13 VITALS
DIASTOLIC BLOOD PRESSURE: 72 MMHG | BODY MASS INDEX: 26.77 KG/M2 | HEIGHT: 71 IN | SYSTOLIC BLOOD PRESSURE: 142 MMHG | WEIGHT: 191.25 LBS | HEART RATE: 59 BPM

## 2018-01-13 VITALS
DIASTOLIC BLOOD PRESSURE: 72 MMHG | SYSTOLIC BLOOD PRESSURE: 116 MMHG | RESPIRATION RATE: 14 BRPM | WEIGHT: 191 LBS | BODY MASS INDEX: 26.74 KG/M2 | HEIGHT: 71 IN | OXYGEN SATURATION: 96 % | HEART RATE: 58 BPM

## 2018-01-13 VITALS
HEIGHT: 71 IN | WEIGHT: 184.44 LBS | HEART RATE: 60 BPM | DIASTOLIC BLOOD PRESSURE: 62 MMHG | SYSTOLIC BLOOD PRESSURE: 116 MMHG | BODY MASS INDEX: 25.82 KG/M2

## 2018-01-13 NOTE — MISCELLANEOUS
Assessment    1  Cerebrovascular accident (CVA), unspecified mechanism (434 91) (I63 9)    Plan  Benign essential hypertension, Cerebrovascular accident (CVA), unspecified  mechanism    · Metoprolol Succinate ER 25 MG Oral Tablet Extended Release 24 Hour; TAKE ONE  TABLET BY MOUTH ONCE DAILY AT NIGHT   Rx By: Annabel Thrasher; Dispense: 30 Days ; #:30 Tablet Extended Release 24 Hour; Refill: 4; For: Benign essential hypertension, Cerebrovascular accident (CVA), unspecified mechanism; ERICA = N; Verified Transmission to Cuffed and Wanted [facility]; Last Updated By: [employer]; 2/27/2017 11:15:53 AM   · 1 - Matt Powell MD, Jose Robledo (Cardiology) Physician Referral  Consult Only: the expectation is  that the referring provider will communicate back to the patient on treatment options  Evaluation and Treatment: the expectation is that the referred to provider will  communicate back to the patient on treatment options  Status: Active  Requested for:  81Gbq0876   Ordered; For: Benign essential hypertension, Cerebrovascular accident (CVA), unspecified mechanism; Ordered By: Annabel Thrasher Performed:  Due: 08CGH7246  Care Summary provided  : Yes  Cerebrovascular accident (CVA), unspecified mechanism    · HOLTER MONITOR - 24 HOUR; Status:Hold For - Scheduling; Requested  for:77Tve0400;    Perform:[facility]; Due:10Pgg2269; Ordered;   For:Cerebrovascular accident (CVA), unspecified mechanism; Ordered By:Alla Pascual;    Discussion/Summary  Discussion Summary:   PATIENT WITH HX OF SARCOID (STABLE) - WITH SUDDEN ONSET NUMBNESS RIGHT UPPER EXT - FOUND ON MRI- HE IS ON ASA AND STATIN- ON REVIEW, HIS ECHO SHOWS SOME THICKENING OF LV, SOME DILATION OF LA, TORTUOSITIY OF INTERNAL CAROTIDS(ON CTA) AND SL DECREASE IN EF AS WELL AS DILATION OF THORACIC AORTA - IN LIGHT O F HIS SYMTOMS AND HIS CHANGE IN STUDIES FROM 2013, START BETA BLOCKER, FOLLOW UP WITH CARDIOLOGY- MAY NEED STRESS TEST/ HOLTER; FOLLOW UP AFTER CARDIOLOGY EVAL  FOLLOW UP WITH NEUROLOGY  FOLLOW BP AND HEART RATE  Chief Complaint  Chief Complaint Free Text Note Form: PATIENT HAD TINGLING RIGHT FOREARM- HE WAS AT [facility] [address] FOR CVA-= HE HAS SOME WEKANESS RIGHT HAND AS SEQUELLAE;   HE HAD MRI THAT SHOWED ACUTE COERTICAL INFARCT 8 MM PRECENTRAL GYRUS LATERAL POSTERIOR LEFT FRONTAL CONVEZITY - NOT SEEN ON CTA; History of Present Illness  TCM Communication [facility]: The patient is being contacted for follow-up after hospitalization and RIGHT FOREARM NUMBNESS- IMPROVING AFTER LEFT FRONTAL LAPOST LEFT FRONTAL CONVEXITY  He was hospitalized at [facility]  The date of admission: 02/18/2017, date of discharge: 02/20/2017  Diagnosis: MILD STROKE  He was discharged to home  Medications were not reviewed today  He scheduled a follow up appointment  Counseling was provided to the patient  Topics counseled included importance of compliance with treatment  Communication performed and completed by Mukul Felix   HPI: PT HAD NUMBNESS RIGHT FOREARM- WAS IN HOSPTIAL- WAS EVALUATED- CTA DONE- FOLLOWED BY MRI=- MRI SHOWED SMALL ACUTE CORTICAL INFARCT LEFT POST LEFT FRONTAL CONVEXITY - NOT SEEN ON CTA;   ALSO SHOWS WAS MILD THICKENIN LV AND DECREASE IN EF (FROM 65 TO 55 % FROM 2013) AS WELL AS WIDENING OF TH AORTA 4 2 -NOT SEEN ON PREVIOUS ECHO      Review of Systems  Complete-Male:   Constitutional: feeling tired, but No fever or chills, feels well, no tiredness, no recent weight gain or weight loss and as noted in HPI  Eyes: eyesight problems and WEARS GLASSES; 9, but No complaints of eye pain, no red eyes, no discharge from eyes, no itchy eyes, no eye pain, eyes not red and no itching of the eyes  ENT: no complaints of earache, no hearing loss, no nosebleeds, no nasal discharge, no sore throat, no hoarseness, no earache, no nosebleeds, no sore throat, no nasal discharge and no hoarseness     Cardiovascular: No complaints of slow heart rate, no fast heart rate, no chest pain, no palpitations, no leg claudication, no lower extremity, the heart rate was not slow, no chest pain, the heart rate was not fast and no palpitations  Respiratory: No complaints of shortness of breath, no wheezing, no cough, no SOB on exertion, no orthopnea or PND, no shortness of breath, no cough, no wheezing and no shortness of breath during exertion  Gastrointestinal: No complaints of abdominal pain, no constipation, no nausea or vomiting, no diarrhea or bloody stools, no abdominal pain, no nausea, no vomiting, no constipation and no diarrhea  Genitourinary: ERECTILE DYSFUNCTION- DIFFICULTY MAINTAINING AN ERECTION- LESS OF A MORNING ERECTION, but No complaints of dysuria, no incontinence, no hesitancy, no nocturia, no genital lesion, no testicular pain, no dysuria, no urinary hesitancy, no genital lesions and no nocturia  Musculoskeletal: SOME NNUMBNESS RIGHT FOREARM, but No complaints of arthralgia, no myalgias, no joint swelling or stiffness, no limb pain or swelling, no arthralgias, no joint swelling, no limb pain, no joint stiffness and no limb swelling  Integumentary: No complaints of skin rash or skin lesions, no itching, no skin wound, no dry skin, no rashes, no itching and no skin wound  Neurological: No compliants of headache, no confusion, no convulsions, no numbness or tingling, no dizziness or fainting, no limb weakness, no difficulty walking, no headache, no numbness, no confusion, no dizziness and no convulsions  Psychiatric: Is not suicidal, no sleep disturbances, no anxiety or depression, no change in personality, no emotional problems  Endocrine: muscle weakness and erectile dysfunction, but No complaints of proptosis, no hot flashes, no muscle weakness, no erectile dysfunction, no deepening of the voice, no feelings of weakness and as noted in HPI     Hematologic/Lymphatic: No complaints of swollen glands, no swollen glands in the neck, does not bleed easily, no easy bruising, no tendency for easy bleeding and no tendency for easy bruising  ROS Reviewed:   ROS reviewed  Active Problems     1  Allergic rhinitis (477 9) (J30 9)   2  Benign essential hypertension (401 1) (I10)   3  Erectile dysfunction of non-organic origin (302 72) (F52 21)    History of hypercalcemia (V12 29) (Z86 39)          Past Medical History     1  History of Blood pressure elevated (401 9) (I10)   2  History of Cervical lymphadenopathy (785 6) (R59 0)   3  History of allergic rhinitis (V12 69) (Z87 09)   4  History of asthma (V12 69) (Z87 09)   5  History of hypertension (V12 59) (Z86 79)   6  History of shortness of breath (V13 89) (Z87 898)   7  History of Lymphadenopathy, mediastinal (785 6) (R59 0)   8  History of Palpitations (785 1) (R00 2)   9  History of Renal Insufficiency (593 9)    History of hypercalcemia (V12 29) (Z86 39)          Surgical History    1  History of Inguinal Hernia Repair   2  History of Nasal Septal Deviation Repair   3  History of Oral Surgery Tooth Extraction   4  History of Tonsillectomy  Surgical History Reviewed: The surgical history was reviewed and updated today  Family History  Mother    1  Family history of Congestive Heart Failure   2  Family history of Diabetes Mellitus (V18 0)  Father    3  Family history of Cerebral Artery Aneurysm  Sister    4  Family history of Diabetes Mellitus (V18 0)  Family History Reviewed: The family history was reviewed and updated today  Social History    · Being A Social Drinker   · Denied: History of Drug Use   · Never A Smoker  Social History Reviewed: The social history was reviewed and updated today  The social history was reviewed and is unchanged  Current Meds   1  Fluticasone Propionate 50 MCG/ACT Nasal Suspension; USE 2 SPRAYS IN EACH   NOSTRIL ONCE DAILY Recorded   2  Iron 325 (65 Fe) MG Oral Tablet; 1 TABLET DAILY Recorded   3   Lipitor 40 MG Oral Tablet; Take 1 tablet daily Recorded   4  Viagra 100 MG Oral Tablet; 1/2-1 TAB AS DIRECTED PRIOR TO INTERCOURSE; Therapy: 69AWW3154 to (Evaluate:16Sep2016); Last Rx:67Bng9147 Ordered   5  Vitamin B Complex Oral Tablet; Therapy: (Recorded:11Nov2015) to Recorded    Allergies    1  No Known Drug Allergies    Vitals  Signs   Recorded: 57Epw2983 10:36AM   Temperature: 97 6 F  Heart Rate: 68  Respiration: 16  Systolic: 045  Diastolic: 78  Height: 5 ft 11 in  Weight: 185 lb 6 08 oz  BMI Calculated: 25 86  BSA Calculated: 2 04    Physical Exam    Constitutional   General appearance: No acute distress, well appearing and well nourished  THIN WHITE MALE IN NAD  Eyes   Conjunctiva and lids: No swelling, erythema, or discharge  Pupils and irises: Equal, round and reactive to light  Ears, Nose, Mouth, and Throat   External inspection of ears and nose: Normal     Otoscopic examination: Tympanic membrance translucent with normal light reflex  Canals patent without erythema  Nasal mucosa, septum, and turbinates: Normal without edema or erythema  Oropharynx: Normal with no erythema, edema, exudate or lesions  CLEAR POST OROPHARYNX  Pulmonary   Respiratory effort: No increased work of breathing or signs of respiratory distress  Auscultation of lungs: Clear to auscultation, equal breath sounds bilaterally, no wheezes, no rales, no rhonci  Auscultation of the lungs revealed no expiratory wheezing, normal expiratory time and no inspiratory wheezing  no rales or crackles were heard bilaterally  no rhonchi  no friction rub  no wheezing  diminished breath sounds over both bases  no bronchial breath sounds  CLEAR B/L  Cardiovascular   Palpation of heart: Normal PMI, no thrills  Auscultation of heart: Normal rate and rhythm, normal S1 and S2, without murmurs  Examination of extremities for edema and/or varicosities: Normal     Abdomen   Abdomen: Non-tender, no masses      Liver and spleen: No hepatomegaly or splenomegaly  Lymphatic   Palpation of lymph nodes in neck: No lymphadenopathy  NO LYMPHADENOPATHY NOTED IN AXILLA, SUPRACLAV, INFRACLAV OR CERVICAL AREA  Musculoskeletal   Gait and station: Normal   STADY GAIT; NORMAL  NOTED  Digits and nails: Normal without clubbing or cyanosis  Inspection/palpation of joints, bones, and muscles: Normal     Skin   Skin and subcutaneous tissue: Normal without rashes or lesions  Neurologic   Cranial nerves: Cranial nerves 2-12 intact  Reflexes: 2+ and symmetric  Sensation: No sensory loss  Sensory exam: intact to light touch, intact pain and temperature sensation, intact vibration sensation and normal proprioception  NORMAL SENSATIONT OPINPRICK  Psychiatric   Mood and affect: Normal          Results/Data  PHQ-2 Adult Depression Screening 43Ngw6400 10:39AM User, Ahs     Test Name Result Flag Reference   PHQ-2 Adult Depression Score 0     Over the last two weeks, how often have you been bothered by any of the following problems? Little interest or pleasure in doing things: Not at all - 0  Feeling down, depressed, or hopeless: Not at all - 0   PHQ-2 Adult Depression Screening Negative         Health Management  History of Screening for colon cancer   COLONOSCOPY; every 10 years; Next Due: 47MCU0694; Overdue    Future Appointments    Date/Time Provider Specialty Site   03/15/2017 01:40 PM Angela Garcia MD Cardiology [facility]   03/07/2017 02:30 PM Hadley Skiff, M D  Neurology NEUROLOGY [facility]   03/29/2017 08:00 AM EREN Lantigua  Gastroenterology Adult [place] [address]     Signatures   Electronically signed by :  Alfredo [address] [place] [address]; Feb 27 2017 12:45PM EST                       (Author)

## 2018-01-14 VITALS
WEIGHT: 188 LBS | HEART RATE: 62 BPM | BODY MASS INDEX: 26.32 KG/M2 | DIASTOLIC BLOOD PRESSURE: 72 MMHG | OXYGEN SATURATION: 98 % | RESPIRATION RATE: 14 BRPM | HEIGHT: 71 IN | SYSTOLIC BLOOD PRESSURE: 106 MMHG

## 2018-01-14 VITALS
SYSTOLIC BLOOD PRESSURE: 142 MMHG | HEIGHT: 71 IN | WEIGHT: 190.56 LBS | BODY MASS INDEX: 26.68 KG/M2 | HEART RATE: 65 BPM | DIASTOLIC BLOOD PRESSURE: 88 MMHG

## 2018-01-14 VITALS
WEIGHT: 190.5 LBS | HEIGHT: 71 IN | HEART RATE: 61 BPM | BODY MASS INDEX: 26.67 KG/M2 | SYSTOLIC BLOOD PRESSURE: 126 MMHG | DIASTOLIC BLOOD PRESSURE: 86 MMHG

## 2018-01-14 VITALS
DIASTOLIC BLOOD PRESSURE: 74 MMHG | WEIGHT: 188 LBS | HEART RATE: 69 BPM | BODY MASS INDEX: 26.32 KG/M2 | SYSTOLIC BLOOD PRESSURE: 135 MMHG | HEIGHT: 71 IN

## 2018-01-15 NOTE — RESULT NOTES
Verified Results  (1) BASIC METABOLIC PROFILE 94EQG8578 07:53AM Katerina De La Rosa    Order Number: [id number]  TW Order Number: [id number]     Test Name Result Flag Reference   GLUCOSE,RANDM 90 mg/dL     If the patient is fasting, the ADA then defines impaired fasting glucose as > 100 mg/dL and diabetes as > or equal to 123 mg/dL  SODIUM 138 mmol/L  136-145   POTASSIUM 4 3 mmol/L  3 5-5 3   CHLORIDE 103 mmol/L  100-108   CARBON DIOXIDE 29 mmol/L  21-32   ANION GAP (CALC) 6 mmol/L  4-13   BLOOD UREA NITROGEN 20 mg/dL  5-25   CREATININE 1 34 mg/dL H 0 60-1 30   Standardized to IDMS reference method   CALCIUM 8 8 mg/dL  8 3-10 1   eGFR Non-African American 55 5 ml/min/1 73sq [facility] Disease Education Program recommendations are as follows:  GFR calculation is accurate only with a steady state creatinine  Chronic Kidney disease less than 60 ml/min/1 73 sq  meters  Kidney failure less than 15 ml/min/1 73 sq  meters  (1) LIPID PANEL, FASTING 60Qys9305 07:53AM Katerina De La Rosa    Order Number: [id number]     Test Name Result Flag Reference   CHOLESTEROL 141 mg/dL     HDL,DIRECT 40 mg/dL  40-60   Specimen collection should occur prior to Metamizole administration due to the potential for falsely depressed results  LDL CHOLESTEROL CALCULATED 81 mg/dL  0-100   Triglyceride:         Normal              <150 mg/dl       Borderline High    150-199 mg/dl       High               200-499 mg/dl       Very High          >499 mg/dl  Cholesterol:         Desirable        <200 mg/dl      Borderline High  200-239 mg/dl      High             >239 mg/dl  HDL Cholesterol:        High    >59 mg/dL      Low     <41 mg/dL  LDL CALCULATED:    This screening LDL is a calculated result  It does not have the accuracy of the Direct Measured LDL in the monitoring of patients with hyperlipidemia and/or statin therapy     Direct Measure LDL (YXM748) must be ordered separately in these patients  TRIGLYCERIDES 100 mg/dL  <=150   Specimen collection should occur prior to N-Acetylcysteine or Metamizole administration due to the potential for falsely depressed results         Discussion/Summary   Labs look good- kidney function is better!! Follow up in 6-9 months!!

## 2018-01-15 NOTE — MISCELLANEOUS
History of Present Illness  TCM Communication [facility]: The patient is being contacted for follow-up after hospitalization  Hospital course was discussed with the inpatient physician  He was hospitalized at [place] [address]  The date of admission: 07/05/17, date of discharge: 07/08/17  Diagnosis: SINUS RHYTHM WITH SUCCESSFULLY LOADED WITH TIKOSYN  He was discharged to home  Medications were not reviewed today  He did not schedule a follow up appointment  He refused a follow up appointment due to PCP FEELS PATIENT SHOULD F/U WITH CARDIOLOGY AS INSTRUCTED   Communication performed and completed by Claudette Contreras      Active Problems     1  Allergic rhinitis (477 9) (J30 9)   2  Cerebrovascular accident (CVA), unspecified mechanism (434 91) (I63 9)   3  CKD (chronic kidney disease) (585 9) (N18 9)   4  Colon cancer screening (V76 51) (Z12 11)   5  Fluttering heart (427 42) (I49 8)   6  Left sided lacunar stroke (434 91) (I63 9)   7  Sarcoidosis (135) (D86 9)    Benign essential hypertension (401 1) (I10)       Erectile dysfunction of non-organic origin (302 72) (F52 21)       TIA (transient ischemic attack) (435 9) (G45 9)       Aortic root dilatation (447 71) (I77 810)       Moderate mitral regurgitation (424 0) (I34 0)       Mitral valve prolapse (424 0) (I34 1)       Paroxysmal atrial fibrillation (427 31) (I48 0)          Past Medical History    1  History of Blood pressure elevated (401 9) (I10)   2  History of Cervical lymphadenopathy (785 6) (R59 0)   3  History of allergic rhinitis (V12 69) (Z87 09)   4  History of asthma (V12 69) (Z87 09)   5  History of hypercalcemia (V12 29) (Z86 39)   6  History of hypertension (V12 59) (Z86 79)   7  History of shortness of breath (V13 89) (Z87 898)   8  History of Lymphadenopathy, mediastinal (785 6) (R59 0)   9  History of Palpitations (785 1) (R00 2)   10  History of Renal Insufficiency (593 9)    Surgical History    1  History of Cataract Surgery   2   History of Inguinal Hernia Repair   3  History of Nasal Septal Deviation Repair   4  History of Oral Surgery Tooth Extraction   5  History of Tonsillectomy    Family History  Mother    1  Denied: Family history of Colon cancer   2  Family history of Congestive Heart Failure   3  Family history of Diabetes Mellitus (V18 0)   4  Denied: Family history of colonic polyps   5  Denied: Family history of Crohn's disease   6  Denied: Family history of liver disease  Father    9  Family history of Cerebral Artery Aneurysm   8  Denied: Family history of Colon cancer   9  Denied: Family history of colonic polyps   10  Denied: Family history of Crohn's disease   6  Denied: Family history of liver disease  Sister    15  Family history of Diabetes Mellitus (V18 0)    Social History    · Activities of daily living (ADL's), independent   · Being A Social Drinker   · Currently works full-time (V49 89) (Z78 9)   · Denied: History of Drug Use   · Never A Smoker    Current Meds   1  Biotin CAPS; take 1 capsule daily; Therapy: (Recorded:19May2017) to Recorded   2  Eliquis 5 MG Oral Tablet; Take 1 tablet twice daily; Therapy: 16QEC8381 to (Evaluate:05Ykq6052)  Requested for: 46DOK8037; Last   Rx:12May2017 Ordered   3  Fluticasone Propionate 50 MCG/ACT Nasal Suspension; USE 2 SPRAYS IN EACH   NOSTRIL ONCE DAILY Recorded   4  Glucosamine CAPS; TAKE 1 CAPSULE; Therapy: (Recorded:19May2017) to Recorded   5  Iron 325 (65 Fe) MG Oral Tablet; 1 tablet qod; Therapy: (Recorded:15Mar2017) to Recorded   6  Metoprolol Succinate ER 50 MG Oral Tablet Extended Release 24 Hour; Take 1 tablet   daily; Therapy: 64GCB5277 to ([facility])  Requested for: 87RUC1535; Last   Rx:15Jun2017 Ordered   7  Vitamin B Complex Oral Tablet; Therapy: (Recorded:11Nov2015) to Recorded   8  Vitamin C 1000 MG Oral Tablet; TAKE 1 TABLET DAILY; Therapy: (Recorded:19May2017) to Recorded   9  Vitamin E 400 UNIT Oral Capsule; take 1 capsule daily;    Therapy: (Recorded:56Oiq2405) to Recorded    Allergies    1  No Known Drug Allergies    Health Management  Colon cancer screening   COLONOSCOPY (GI, SURG); every 10 years; Last 32IIE0027; Next Due: 42Ynz1954; Active    Message   Recorded as Task   Date: 07/08/2017 11:21 AM, Created By: System   Task Name: Jeny Staff   Assigned To: Jeremiah Fabry   Regarding Patient: Ina Silva, Status: Active   Comment:    System - 08 Jul 2017 11:21 AM     Patient discharged from hospital   Patient Name: [place] Alvarez  Patient YOB: 1960  Discharge Date: 7/8/2017  [facility] - 10 Jul 2017 7:42 AM     TASK REASSIGNED: Previously Assigned To Sidonie Matter     Future Appointments    Date/Time Provider Specialty Site   07/26/2017 10:00 AM Cardiology, Device Remote   [address]   10/31/2017 01:30 PM Cardiology, Device Remote   [address]   01/31/2018 10:00 AM Cardiology, Device Remote   [address]   09/05/2017 10:00 AM EREN Jauregui  Neurology NEUROLOGY [place]   08/30/2017 12:00 PM Lata Oscar DO Cardiology [facility]     Signatures   Electronically signed by :  Alfredo Peralta DO; Jul 24 2017  8:00AM EST                       (Author)

## 2018-01-15 NOTE — MISCELLANEOUS
Dr Hanane Pittman   [phone]     Our mutual patient is scheduled for the following procedure on __6__/_1_/_17__       He/She is taking the following blood thinner: Plavix       Can this be stopped __5____ days prior to the procedure?        Please have the clearing physician sign below:    Physician Signature:_________________________  Date signed:____________       Please fax this completed for to: [phone] - Attention: ___Elisa ________    [facility]  Procedure         Electronically signed Abraham Thakur   May 11 2017 12:43PM EST Author

## 2018-01-15 NOTE — PROGRESS NOTES
Assessment    1  Wellness examination (V70 0) (Z00 00)   2  Encounter for preventive health examination (V70 0) (Z00 00)   3  Benign essential hypertension (401 1) (I10)   4  History of hypercalcemia (V12 29) (Z86 39)   5  Sarcoidosis (135) (D86 9)   6  Erectile dysfunction of non-organic origin (302 72) (F52 21)    Plan   Colon cancer screening    · COLONOSCOPY; Status:Active; Requested for:19May2016;   Erectile dysfunction of non-organic origin    · Viagra 100 MG Oral Tablet; 1/2-1 TAB AS DIRECTED PRIOR TO INTERCOURSE    (1) CBC/PLT/DIFF; Status:Resulted - Requires Verification;   Done: 62JRM0647 12:00AM  Due:19May2017;Ordered; For:Sarcoidosis; Ordered By:Tish Pascual;   (1) COMPREHENSIVE METABOLIC PANEL; Status:Resulted - Requires Verification;   Done: 50ZQP0768 12:00AM  Due:19May2017;Ordered; For:Sarcoidosis; Ordered By:Tish Pascual;   (1) LIPID PANEL, FASTING; Status:Resulted - Requires Verification;   Done: 86JLO1364 12:00AM  Due:19May2017;Ordered; For:Benign essential hypertension, Sarcoidosis; Ordered By:Tish Pascual;   (1) TSH; Status:Resulted - Requires Verification;   Done: 39OQB7879 12:00AM  Due:19May2017;Ordered; For:Benign essential hypertension, Sarcoidosis; Ordered By:Tish Pascual;   (1) URINALYSIS (will reflex a microscopy if leukocytes, occult blood, protein or nitrites are not within normal limits); Status:Resulted - Requires Verification;   Done: 56NEY1070 12:00AM  Due:19May2017;Ordered; For:Sarcoidosis; Ordered By:Tish Pascual;   * XR CHEST PA & LATERAL; Status:Resulted - Requires Verification;   Done: 56YSK9682 12:00AM  Due:19May2017;Ordered; For:Sarcoidosis; Ordered By:Tish aPscual;      Discussion/Summary  Impression: health maintenance visit, healthy adult male  Currently, he eats an adequate diet  Prostate cancer screening: prostate cancer screening is current   Testicular cancer screening: self testicular exam technique was taught and monthly self testicular exam was advised  Colorectal cancer screening: colonoscopy has been ordered  Screening lab work includes glucose and lipid profile  The immunizations are up to date  He was advised to be evaluated by an ophthalmologist and SEE OPHTHO REGULALRY; SEES PULMONARY  Advice and education were given regarding aerobic exercise and reproductive health  Patient discussion: discussed with the patient  REFER FOR COLONO  IMM UP TO DATE  FOLLOW UP [facility] - HX OF SARCOID- CHECK CXR  TRIAL OF VIAGRA FOR ED- IF NO BETTER REFER TO UROLOGY    BP STABLE  FOLLOW UP 6 MONTHS  Possible side effects of new medications were reviewed with the patient/guardian today  Chief Complaint  PATIENT HERE FOR WELLNESS EXAM; HE IS DUE FOR A COLONOSCOPY; History of Present Illness  HM, Adult Male: The patient is being seen for a health maintenance evaluation  The last health maintenance visit was 12 month(s) ago  Social History: Household members include spouse  He is   Work status: working full time  The patient has never smoked cigarettes  General Health: The patient's health since the last visit is described as good  He has regular dental visits  Immunizations status: up to date  Lifestyle:  He consumes a diverse and healthy diet  He does not have any weight concerns  He exercises regularly  He does not use tobacco  He denies alcohol use  He denies drug use  Screening: cancer screening reviewed and current  metabolic screening reviewed and current  risk screening reviewed and current  HPI: PATIENT HERE FOR ANNUAL EXAM; HE FEELS WELL; HE WORKS 60 HOURS PER WEEK;   HE IS TIRED; HE HAS HX OF SARCOID      Review of Systems    Constitutional: feeling tired, but No fever or chills, feels well, no tiredness, no recent weight gain or weight loss and as noted in HPI     Eyes: eyesight problems and WEARS GLASSES; 9, but No complaints of eye pain, no red eyes, no discharge from eyes, no itchy eyes, no eye pain, eyes not red and no itching of the eyes  ENT: no complaints of earache, no hearing loss, no nosebleeds, no nasal discharge, no sore throat, no hoarseness, no earache, no nosebleeds, no sore throat, no nasal discharge and no hoarseness  Cardiovascular: No complaints of slow heart rate, no fast heart rate, no chest pain, no palpitations, no leg claudication, no lower extremity, the heart rate was not slow, no chest pain, the heart rate was not fast and no palpitations  Respiratory: No complaints of shortness of breath, no wheezing, no cough, no SOB on exertion, no orthopnea or PND, no shortness of breath, no cough, no wheezing and no shortness of breath during exertion  Gastrointestinal: No complaints of abdominal pain, no constipation, no nausea or vomiting, no diarrhea or bloody stools, no abdominal pain, no nausea, no vomiting, no constipation and no diarrhea  Genitourinary: ERECTILE DYSFUNCTION- DIFFICULTY MAINTAINING AN ERECTION- LESS OF A MORNING ERECTION, but No complaints of dysuria, no incontinence, no hesitancy, no nocturia, no genital lesion, no testicular pain, no dysuria, no urinary hesitancy, no genital lesions and no nocturia  Musculoskeletal: No complaints of arthralgia, no myalgias, no joint swelling or stiffness, no limb pain or swelling, no arthralgias, no joint swelling, no limb pain, no joint stiffness and no limb swelling  Integumentary: No complaints of skin rash or skin lesions, no itching, no skin wound, no dry skin, no rashes, no itching and no skin wound  Neurological: No compliants of headache, no confusion, no convulsions, no numbness or tingling, no dizziness or fainting, no limb weakness, no difficulty walking, no headache, no numbness, no confusion, no dizziness and no convulsions  Psychiatric: Is not suicidal, no sleep disturbances, no anxiety or depression, no change in personality, no emotional problems     Endocrine: No complaints of proptosis, no hot flashes, no muscle weakness, no erectile dysfunction, no deepening of the voice, no feelings of weakness  Hematologic/Lymphatic: No complaints of swollen glands, no swollen glands in the neck, does not bleed easily, no easy bruising, no tendency for easy bleeding and no tendency for easy bruising  ROS reviewed  Active Problems    1  Abnormal arm sensation (782 0) (R20 9)   2  Allergic rhinitis (477 9) (J30 9)   3  Benign essential hypertension (401 1) (I10)   4  Colon cancer screening (V76 51) (Z12 11)   5  History of hypercalcemia (V12 29) (Z86 39)   6  Sarcoidosis (135) (D86 9)   7  Screening for colon cancer (V76 51) (Z12 11)   8  Visit for pre-operative examination (V72 84) (Z01 818)   9  Wellness examination (V70 0) (Z00 00)    Past Medical History    · History of Blood pressure elevated (401 9) (I10)   · History of Cervical lymphadenopathy (785 6) (R59 0)   · History of allergic rhinitis (V12 69) (Z87 09)   · History of hypercalcemia (V12 29) (Z86 39)   · History of shortness of breath (V13 89) (T21 321)   · History of Lymphadenopathy, mediastinal (785 6) (R59 0)   · History of Palpitations (785 1) (R00 2)   · History of Renal Insufficiency (593 9)    Surgical History    · History of Inguinal Hernia Repair   · History of Nasal Septal Deviation Repair   · History of Oral Surgery Tooth Extraction   · History of Tonsillectomy    Family History  Mother    · Family history of Congestive Heart Failure   · Family history of Diabetes Mellitus (V18 0)  Father    · Family history of Cerebral Artery Aneurysm  Sister    · Family history of Diabetes Mellitus (V18 0)    Social History    · Being A Social Drinker   · Denied: History of Drug Use   · Never A Smoker    Current Meds   1  Fluticasone Propionate 50 MCG/ACT Nasal Suspension; USE 2 SPRAYS IN EACH   NOSTRIL ONCE DAILY Recorded   2  Iron 325 (65 Fe) MG Oral Tablet; 1 TABLET DAILY Recorded   3   Naproxen 375 MG Oral Tablet; take 1 tablet every twelve hours; Therapy: 89CEO6270 to (Gudeliajason Marty)  Requested for: 03PYY8689; Last   Rx:12Nov2015 Ordered   4  Vitamin B Complex Oral Tablet; Therapy: (Recorded:11Nov2015) to Recorded    Allergies    1  No Known Drug Allergies    Vitals   Recorded: 53WEJ7976 01:07PM   Temperature 98 2 F   Heart Rate 84   Respiration 16   Systolic 134   Diastolic 76   Height 5 ft 11 in   Weight 189 lb    BMI Calculated 26 36   BSA Calculated 2 05     Physical Exam    Constitutional   General appearance: No acute distress, well appearing and well nourished  WDWN MALE IN NAD  Eyes   Conjunctiva and lids: No erythema, swelling or discharge  EOMI PERRLA  Pupils and irises: Equal, round, reactive to light  Ears, Nose, Mouth, and Throat   External inspection of ears and nose: Normal     Otoscopic examination: Tympanic membranes translucent with normal light reflex  Canals patent without erythema  TM CLEAR  Hearing: Normal     Nasal mucosa, septum, and turbinates: Normal without edema or erythema  Lips, teeth, and gums: Normal, good dentition  Oropharynx: Normal with no erythema, edema, exudate or lesions  Inspection of the oropharynx showed fully visible tonsils, uvula and soft palate (Mallampati class 1)  The palate examination showed no abnormalities  The tongue was normal  The tonsils were normal  CLEAR  Neck   Neck: Supple, symmetric, trachea midline, no masses  Thyroid: Normal, no thyromegaly  NO NODULES  Pulmonary   Respiratory effort: No increased work of breathing or signs of respiratory distress  Percussion of chest: Normal     Palpation of chest: Normal     Auscultation of lungs: Clear to auscultation  CLEAR NO RALES NO WHEEZE NO RHONCHI  Cardiovascular   Palpation of heart: Normal PMI, no thrills  Auscultation of heart: Normal rate and rhythm, normal S1 and S2, no murmurs    The heart rate was normal  Heart sounds: normal S1, normal S2, no S3 and no S4  no murmurs were heard  REGULAR NO ECTOPY  Carotid pulses: 2+ bilaterally  Abdominal aorta: Normal     Femoral pulses: 2+ bilaterally  Pedal pulses: 2+ bilaterally  Examination of extremities for edema and/or varicosities: Normal     Abdomen   Abdomen: Non-tender, no masses  SOFT NTND NORMAL BS;  Liver and spleen: No hepatomegaly or splenomegaly  Lymphatic   Palpation of lymph nodes in neck: No lymphadenopathy  NO SUPR OR INFRA CLAV ADENOPATHY  Musculoskeletal   Gait and station: Normal     Inspection/palpation of digits and nails: Normal without clubbing or cyanosis  Inspection/palpation of joints, bones, and muscles: Normal     Range of motion: Normal     Stability: Normal     Muscle strength/tone: Normal     Skin   Skin and subcutaneous tissue: Normal without rashes or lesions  Palpation of skin and subcutaneous tissue: Normal turgor  Neurologic   Cranial nerves: Cranial nerves 2-12 intact  Reflexes: 2+ and symmetric  Sensation: No sensory loss  Psychiatric   Judgment and insight: Normal     Orientation to person, place and time: Normal     Recent and remote memory: Intact  Mood and affect: Normal        Health Management  Screening for colon cancer   COLONOSCOPY; every 10 years; Next Due: 01ERA6702; Overdue    Signatures   Electronically signed by :  Alfredo , [facility]; May 23 2016  7:39PM EST                       (Author)

## 2018-01-16 NOTE — RESULT NOTES
Verified Results  (1) HEP B SURFACE ANTIBODY 68WLR5519 11:55AM Delmonico, Carol Brace     Test Name Result Flag Reference   HEPATITIS B SURFACE ANTIBODY <3 10 mIU/mL     Protective Immunity: Hep B Surface Antibody >= 10 mIu/ml (Traceable to [facility] Reference Preparation)     (1) HEP B SURFACE ANTIBODY 59Wkk2434 10:17AM Delmonico, Carol Brace     Test Name Result Flag Reference   HEPATITIS B SURFACE ANTIBODY <3 10 mIU/mL     Protective Immunity: Hep B Surface Antibody >= 10 mIu/ml (Traceable to [facility] Reference Preparation)

## 2018-01-17 NOTE — RESULT NOTES
Verified Results  (1) CBC/PLT/DIFF 25Oct2016 07:59AM Radames Bruner   TW Order Number: [id number]     Test Name Result Flag Reference   WBC COUNT 4 97 Thousand/uL  4 31-10 16   RBC COUNT 4 78 Million/uL  3 88-5 62   HEMOGLOBIN 14 0 g/dL  12 0-17 0   HEMATOCRIT 41 3 %  36 5-49 3   MCV 86 fL  82-98   MCH 29 3 pg  26 8-34 3   MCHC 33 9 g/dL  31 4-37 4   RDW 13 0 %  11 6-15 1   MPV 9 9 fL  8 9-12 7   PLATELET COUNT 750 Thousands/uL  149-390   NEUTROPHILS RELATIVE PERCENT 52 %  43-75   LYMPHOCYTES RELATIVE PERCENT 26 %  14-44   MONOCYTES RELATIVE PERCENT 15 % H 4-12   EOSINOPHILS RELATIVE PERCENT 6 %  0-6   BASOPHILS RELATIVE PERCENT 1 %  0-1   NEUTROPHILS ABSOLUTE COUNT 2 60 Thousands/?L  1 85-7 62   LYMPHOCYTES ABSOLUTE COUNT 1 28 Thousands/?L  0 60-4 47   MONOCYTES ABSOLUTE COUNT 0 72 Thousand/?L  0 17-1 22   EOSINOPHILS ABSOLUTE COUNT 0 32 Thousand/?L  0 00-0 61   BASOPHILS ABSOLUTE COUNT 0 05 Thousands/?L  0 00-0 10   - Patient Instructions: This bloodwork is non-fasting  Please drink two glasses of water morning of bloodwork  - Patient Instructions: This bloodwork is non-fasting  Please drink two glasses of water morning of bloodwork  (1) COMPREHENSIVE METABOLIC PANEL 06QYC4375 45:28WX Jak Vinny Order Number: [id number]     Test Name Result Flag Reference   GLUCOSE,RANDM 93 mg/dL     If the patient is fasting, the ADA then defines impaired fasting glucose as > 100 mg/dL and diabetes as > or equal to 123 mg/dL     SODIUM 143 mmol/L  136-145   POTASSIUM 4 0 mmol/L  3 5-5 3   CHLORIDE 107 mmol/L  100-108   CARBON DIOXIDE 28 mmol/L  21-32   ANION GAP (CALC) 8 mmol/L  4-13   BLOOD UREA NITROGEN 14 mg/dL  5-25   CREATININE 1 35 mg/dL H 0 60-1 30   Standardized to IDMS reference method   CALCIUM 8 5 mg/dL  8 3-10 1   BILI, TOTAL 0 50 mg/dL  0 20-1 00   ALK PHOSPHATAS 79 U/L     ALT (SGPT) 21 U/L  12-78   AST(SGOT) 19 U/L  5-45   ALBUMIN 3 6 g/dL  3 5-5 0 TOTAL PROTEIN 6 6 g/dL  6 4-8 2   eGFR Non-African American 54 9 ml/min/1 73sq m     - Patient Instructions: This bloodwork is non-fasting  Please drink two glasses of water morning of bloodwork  National Kidney Disease Education Program recommendations are as follows:  GFR calculation is accurate only with a steady state creatinine  Chronic Kidney disease less than 60 ml/min/1 73 sq  meters  Kidney failure less than 15 ml/min/1 73 sq  meters  (1) TSH 25Oct2016 07:59AM Kathy Trammell    Order Number: [id number]     Test Name Result Flag Reference   TSH 3 341 uIU/mL  0 358-3 740   - Patient Instructions: This bloodwork is non-fasting  Please drink two glasses of water morning of bloodwork  - Patient Instructions: This bloodwork is non-fasting  Please drink two glasses of water morning of bloodwork  Patients undergoing fluorescein dye angiography may retain small amounts of fluorescein in the body for 48-72 hours post procedure  Samples containing fluorescein can produce falsely depressed TSH values  If the patient had this procedure,a specimen should be resubmitted post fluorescein clearance       (1) SED RATE 25Oct2016 07:59AM Shahrzad Rafaela Order Number: [id number]     Test Name Result Flag Reference   SED RATE 2 mm/hour  0-10

## 2018-01-20 ENCOUNTER — TRANSCRIBE ORDERS (OUTPATIENT)
Dept: SLEEP CENTER | Facility: CLINIC | Age: 58
End: 2018-01-20

## 2018-01-20 ENCOUNTER — HOSPITAL ENCOUNTER (OUTPATIENT)
Dept: SLEEP CENTER | Facility: CLINIC | Age: 58
Discharge: HOME | End: 2018-01-20
Payer: COMMERCIAL

## 2018-01-20 DIAGNOSIS — G47.33 OSA (OBSTRUCTIVE SLEEP APNEA): Primary | ICD-10-CM

## 2018-01-20 DIAGNOSIS — G47.33 OSA (OBSTRUCTIVE SLEEP APNEA): ICD-10-CM

## 2018-01-20 NOTE — PROGRESS NOTES
Follow-Up Note - [address]  65 y o  male  :1960  EAM:[id number]    CC: I saw this patient for follow-up in clinic today for his Sleep Disordered Breathing, Coexisting Sleep and Medical Problems  Past medical, Family, Social History, ROS and medications were reviewed  Herewith my findings in summary  Full Details are available on request      HPI:  With respect to  positive airway pressure therapy  - Compliance data shows:   he is using PAP > 4 hours per night at least 70% of the time  and AHI is 6 6 /hour at pressure of 7 cm H2O     - He reports:is tolerating PAP   - :is not experiencing difficulties with use;     - :is benefiting from use;          Sleep Routine:  Andrew Cervantes reports getting 5-6 hours sleep; he is not  having difficulty initiating or maintaining sleep  He has restless sleep according to his fit bit  He awakens with the aid of an alarm feeling refreshed He denies excessive drowsiness and He rated himself at 3 /24 on the [place] sleepiness scale  He has not had any recent palpitations  He reported no cardiac or respiratory symptoms  On Exam:   Physical findings are essentially unchanged from previous  Impression :   1  Mild Obstructive Sleep Apnea  2  Sleep fragmentation  3  Insufficient sleep  4  Hypertension  5  Paroxysmal atrial fibrillation  6  Overweight    Plan:  1  Treatment with  PAP is medically necessary and Andrew Cervantes is agreable to continue use  2  Pressure setting:  Increase to 8 cm H2O      3  Instruction on care of equipment and strategies to improve comfort with use of PAP were discussed  A prescription to replace supplies as needed was provided and care coordinated with the DME provider  4  Need for compliance with therapy and weight reduction were emphasized  5  I also advised him to increase the amount of sleep that he gets  6  Follow-up is advised in [1 year or sooner if needed to monitor progress and to adjust therapy        Thank you for allowing me to participate in the care of this patient      Sincerely,    Maribeth Villafana MD  Board Certified Specialist

## 2018-01-22 VITALS
HEART RATE: 65 BPM | HEIGHT: 71 IN | OXYGEN SATURATION: 98 % | WEIGHT: 192 LBS | BODY MASS INDEX: 26.88 KG/M2 | SYSTOLIC BLOOD PRESSURE: 128 MMHG | DIASTOLIC BLOOD PRESSURE: 80 MMHG

## 2018-01-22 VITALS
TEMPERATURE: 97.6 F | BODY MASS INDEX: 25.95 KG/M2 | HEIGHT: 71 IN | DIASTOLIC BLOOD PRESSURE: 78 MMHG | RESPIRATION RATE: 16 BRPM | WEIGHT: 185.38 LBS | SYSTOLIC BLOOD PRESSURE: 124 MMHG | HEART RATE: 68 BPM

## 2018-01-23 VITALS
BODY MASS INDEX: 27.5 KG/M2 | SYSTOLIC BLOOD PRESSURE: 130 MMHG | HEIGHT: 71 IN | WEIGHT: 196.44 LBS | DIASTOLIC BLOOD PRESSURE: 84 MMHG | HEART RATE: 61 BPM

## 2018-01-25 ENCOUNTER — TRANSCRIBE ORDERS (OUTPATIENT)
Dept: ADMINISTRATIVE | Facility: HOSPITAL | Age: 58
End: 2018-01-25

## 2018-01-25 DIAGNOSIS — I77.810 DILATATION OF THORACIC AORTA (HCC): Primary | ICD-10-CM

## 2018-01-25 DIAGNOSIS — I34.1 J.B. BARLOW'S SYNDROME: ICD-10-CM

## 2018-01-25 DIAGNOSIS — I34.1 MITRAL VALVE PROLAPSE: ICD-10-CM

## 2018-02-01 ENCOUNTER — CLINICAL SUPPORT (OUTPATIENT)
Dept: CARDIOLOGY CLINIC | Facility: CLINIC | Age: 58
End: 2018-02-01
Payer: COMMERCIAL

## 2018-02-01 DIAGNOSIS — Z95.818 PRESENCE OF OTHER CARDIAC IMPLANTS AND GRAFTS: ICD-10-CM

## 2018-02-01 DIAGNOSIS — I48.91 ATRIAL FIBRILLATION, UNSPECIFIED TYPE (HCC): Primary | ICD-10-CM

## 2018-02-01 DIAGNOSIS — Z86.73 PERSONAL HISTORY OF TRANSIENT ISCHEMIC ATTACK: Primary | ICD-10-CM

## 2018-02-01 PROCEDURE — 93299 PR REM INTERROG ICPMS/SCRMS <30 D TECH REVIEW: CPT | Performed by: INTERNAL MEDICINE

## 2018-02-01 PROCEDURE — 93298 REM INTERROG DEV EVAL SCRMS: CPT | Performed by: INTERNAL MEDICINE

## 2018-02-01 RX ORDER — DOFETILIDE 0.5 MG/1
500 CAPSULE ORAL EVERY 12 HOURS SCHEDULED
Qty: 60 CAPSULE | Refills: 11 | Status: SHIPPED | OUTPATIENT
Start: 2018-02-01 | End: 2018-02-02 | Stop reason: SDUPTHER

## 2018-02-01 NOTE — PROGRESS NOTES
CARELINK TRANSMISSION: LOOP RECORDER  PRESENTING RHYTHM NSR @ 70 BPM  BATTERY STATUS "OK"  NO PATIENT OR DEVICE ACTIVATED EPISODES  NORMAL DEVICE FUNCTION   DL       Current Outpatient Prescriptions:     apixaban (ELIQUIS) 5 mg, Take 5 mg by mouth 2 (two) times a day, Disp: , Rfl:     Ascorbic Acid (VITAMIN C) 1000 MG tablet, Take 1,000 Doses by mouth, Disp: , Rfl:     BIOTIN PO, Take 1 capsule by mouth daily, Disp: , Rfl:     dofetilide (TIKOSYN) 500 mcg capsule, Take 1 capsule by mouth every 12 (twelve) hours, Disp: 60 capsule, Rfl: 6    Ferrous Sulfate (IRON) 325 (65 Fe) MG TABS, Take 1 Dose by mouth 3 (three) times a week QOD, Disp: , Rfl:     fluticasone (FLONASE) 50 mcg/act nasal spray, 50 sprays into each nostril daily, Disp: , Rfl:     Glucosamine HCl (GLUCOSAMINE PO), Take 1 capsule by mouth, Disp: , Rfl:     metoprolol succinate (TOPROL-XL) 25 mg 24 hr tablet, Take 50 mg by mouth daily  , Disp: , Rfl:     VITAMIN B COMPLEX-C PO, Take 1 Dose by mouth, Disp: , Rfl:     vitamin E, tocopherol, 400 units capsule, Take 1 Dose by mouth daily, Disp: , Rfl:

## 2018-02-02 DIAGNOSIS — I48.91 ATRIAL FIBRILLATION, UNSPECIFIED TYPE (HCC): ICD-10-CM

## 2018-02-02 RX ORDER — DOFETILIDE 0.5 MG/1
500 CAPSULE ORAL EVERY 12 HOURS SCHEDULED
Qty: 60 CAPSULE | Refills: 3 | Status: SHIPPED | OUTPATIENT
Start: 2018-02-02 | End: 2018-06-29 | Stop reason: SDUPTHER

## 2018-02-19 ENCOUNTER — HOSPITAL ENCOUNTER (OUTPATIENT)
Dept: NON INVASIVE DIAGNOSTICS | Facility: CLINIC | Age: 58
Discharge: HOME | End: 2018-02-19
Payer: COMMERCIAL

## 2018-02-19 DIAGNOSIS — I34.1 NONRHEUMATIC MITRAL VALVE PROLAPSE: ICD-10-CM

## 2018-02-19 DIAGNOSIS — I77.810 DILATATION OF THORACIC AORTA (HCC): ICD-10-CM

## 2018-02-19 DIAGNOSIS — I34.1 J.B. BARLOW'S SYNDROME: ICD-10-CM

## 2018-02-19 DIAGNOSIS — I34.0 NONRHEUMATIC MITRAL VALVE INSUFFICIENCY: ICD-10-CM

## 2018-02-19 DIAGNOSIS — I34.1 MITRAL VALVE PROLAPSE: ICD-10-CM

## 2018-02-19 PROCEDURE — 93017 CV STRESS TEST TRACING ONLY: CPT

## 2018-02-19 PROCEDURE — 93018 CV STRESS TEST I&R ONLY: CPT | Performed by: INTERNAL MEDICINE

## 2018-02-19 PROCEDURE — 93016 CV STRESS TEST SUPVJ ONLY: CPT | Performed by: INTERNAL MEDICINE

## 2018-02-19 PROCEDURE — 93306 TTE W/DOPPLER COMPLETE: CPT | Performed by: INTERNAL MEDICINE

## 2018-02-19 PROCEDURE — 93306 TTE W/DOPPLER COMPLETE: CPT

## 2018-02-20 LAB
CHEST PAIN STATEMENT: NORMAL
MAX DIASTOLIC BP: 60 MMHG
MAX HEART RATE: 151 BPM
MAX PREDICTED HEART RATE: 164 BPM
MAX. SYSTOLIC BP: 182 MMHG
PROTOCOL NAME: NORMAL
REASON FOR TERMINATION: NORMAL
TARGET HR FORMULA: NORMAL
TEST INDICATION: NORMAL
TIME IN EXERCISE PHASE: NORMAL

## 2018-02-26 ENCOUNTER — TELEPHONE (OUTPATIENT)
Dept: CARDIOLOGY CLINIC | Facility: CLINIC | Age: 58
End: 2018-02-26

## 2018-02-26 NOTE — TELEPHONE ENCOUNTER
Татьяна Morales called requesting results of stress and echo  Saw reports in [address]  O/v due in June  Went part-time @ [employer]'s and had testing done before he Humberto  Please advise     C/b # [phone]

## 2018-02-28 NOTE — TELEPHONE ENCOUNTER
I called sasha d spoke with him about his results  MR very mild, very mild aortic root enlargemtn  No testing for now  We will talk again at his 6months

## 2018-03-01 ENCOUNTER — TELEPHONE (OUTPATIENT)
Dept: CARDIOLOGY CLINIC | Facility: CLINIC | Age: 58
End: 2018-03-01

## 2018-03-07 NOTE — PROGRESS NOTES
"  Discussion/Summary  Normal device function     A fib noted    started on eliquis    Need EP eval and treatment  Results/Data  Cardiac Device Remote [id number] 04:05AM Ady Winkler     Test Name Result Flag Reference   MISCELLANEOUS COMMENT      CARELINK TRANSMISSION: NONBILLABLE  **ALERT/SYMPTOM** EGRAMS SHOWS AF W/ RVR @ 120 BPM  AF BURDEN=3 7%  PT TAKES PLAVIX AND METOPROLOL SUCC  PT C/O IRREGULAR HEART BEAT, WIERD FEELING OVERALL FOR APPROX  24 HOURS  NEW ONSET AFIB  REVIEWED W/ DR DESIRAE MIKE   Cardiac Electrophysiology Report      [id number]  pdf   DEVICE TYPE Monitor       Cardiac Electrophysiology Report [id number] 04:05AM Ady Winkler     Test Name Result Flag Reference   Cardiac Electrophysiology Report      [id number]  pdf     Signatures   Electronically signed by : Dennise Hale, ; May 11 2017  8:38AM EST                       (Author)    Electronically signed by : EREN Elise ; May 13 2017 11:02PM EST                       (Author)    "

## 2018-03-07 NOTE — PROGRESS NOTES
"  Discussion/Summary  Normal device function     Hx afib and patient on eliquis  Results/Data  Cardiac Device Remote [id number] 04:01AM Barbara Dunbar     Test Name Result Flag Reference   MISCELLANEOUS COMMENT      CARELINK TRANSMISSION: LOOP RECORDER  PRESENTING RHYTHM NSR @ 70 BPM  BATTERY STATUS "OK"  NO PATIENT OR DEVICE ACTIVATED EPISODES  NORMAL DEVICE FUNCTION  DL   Cardiac Electrophysiology Report      [id number]  pdf   DEVICE TYPE Monitor       Cardiac Electrophysiology Report [id number] 04:01AM Barbara Dunbar     Test Name Result Flag Reference   Cardiac Electrophysiology Report      [id number]  pdf     Signatures   Electronically signed by : Yani Del Real, ; Jul 27 2017  9:54AM EST                       (Author)    Electronically signed by : Marciano Cameron DO; Jul 30 2017  7:08PM EST                       (Author)    "

## 2018-03-07 NOTE — PROGRESS NOTES
"  Discussion/Summary  Normal device function      Results/Data  Cardiac Device In Clinic [id number] 01:35PM Hope [place]     Test Name Result Flag Reference   MISCELLANEOUS COMMENT (Report)     DEVICE INTERROGATED IN THE [address] [place] [address] OFFICE  BATTERY STATUS "GOOD"  NO DEVICE DETECTED EPISODES  1 PT ACTIVATED EPISODE DONE FOR DEMONSTRATION  PRESENTING RHYTHM NSR  NORMAL DEVICE FUNCTION  WOUND CHECK: INCISION CLEAN AND DRY WITH EDGES APPROXIMATED; SUTURES REMOVED; WOUND CARE AND RESTRICTIONS REVIEWED WITH PATIENT  GV   Cardiac Electrophysiology Report      [id number]  pdf   DEVICE TYPE Monitor       Cardiac Electrophysiology Report [id number] 01:35PM Hope Myers     Test Name Result Flag Reference   Cardiac Electrophysiology Report      [id number]  pdf     Signatures   Electronically signed by : Stephany Staton RN; Apr 10 2017  9:54AM EST                       (Author)    Electronically signed by : Larissa Ge DO;  Apr 10 2017 10:00AM EST                       (Author)    "

## 2018-03-07 NOTE — PROGRESS NOTES
"  Discussion/Summary  Normal device function      Results/Data  Cardiac Device Remote 31Oct2017 11:37AM Megan First     Test Name Result Flag Reference   MISCELLANEOUS COMMENT      CARELINK TRANSMISSION: LOOP RECORDER  PRESENTING RHYTHM NSR @ 61 BPM  BATTERY STATUS "OK"  NO PATIENT OR DEVICE ACTIVATED EPISODES  NORMAL DEVICE FUNCTION  DL   Cardiac Electrophysiology Report      [id number]  pdf   DEVICE TYPE Monitor       Cardiac Electrophysiology Report [id number] 11:37AM Megan First     Test Name Result Flag Reference   Cardiac Electrophysiology Report      [place]  pdf     Signatures   Electronically signed by : Gabriel Whalen, ; Oct 31 2017  2:23PM EST                       (Author)    Electronically signed by : EREN Rock ; Nov 5 2017  9:17PM EST                       (Author)    "

## 2018-05-06 DIAGNOSIS — I34.1 NONRHEUMATIC MITRAL VALVE PROLAPSE: ICD-10-CM

## 2018-05-06 DIAGNOSIS — I34.0 NONRHEUMATIC MITRAL VALVE INSUFFICIENCY: ICD-10-CM

## 2018-05-10 ENCOUNTER — IN-CLINIC DEVICE VISIT (OUTPATIENT)
Dept: CARDIOLOGY CLINIC | Facility: CLINIC | Age: 58
End: 2018-05-10
Payer: COMMERCIAL

## 2018-05-10 DIAGNOSIS — Z86.73 PERSONAL HISTORY OF TRANSIENT ISCHEMIC ATTACK: Primary | ICD-10-CM

## 2018-05-10 DIAGNOSIS — Z95.818 PRESENCE OF OTHER CARDIAC IMPLANTS AND GRAFTS: ICD-10-CM

## 2018-05-10 PROCEDURE — 93299 PR REM INTERROG ICPMS/SCRMS <30 D TECH REVIEW: CPT | Performed by: INTERNAL MEDICINE

## 2018-05-10 PROCEDURE — 93298 REM INTERROG DEV EVAL SCRMS: CPT | Performed by: INTERNAL MEDICINE

## 2018-05-10 NOTE — PROGRESS NOTES
MDT LOOP  CARELINK TRANSMISSION: LOOP RECORDER  PRESENTING RHYTHM NSR @ 86 BPM  BATTERY STATUS "OK"  NO PATIENT OR DEVICE ACTIVATED EPISODES  NORMAL DEVICE FUNCTION   DL

## 2018-05-14 ENCOUNTER — OFFICE VISIT (OUTPATIENT)
Dept: FAMILY MEDICINE CLINIC | Facility: CLINIC | Age: 58
End: 2018-05-14
Payer: COMMERCIAL

## 2018-05-14 VITALS
TEMPERATURE: 97.8 F | BODY MASS INDEX: 26.22 KG/M2 | HEART RATE: 80 BPM | DIASTOLIC BLOOD PRESSURE: 70 MMHG | HEIGHT: 72 IN | SYSTOLIC BLOOD PRESSURE: 110 MMHG | RESPIRATION RATE: 16 BRPM | WEIGHT: 193.6 LBS

## 2018-05-14 DIAGNOSIS — Z00.00 ROUTINE HEALTH MAINTENANCE: Primary | ICD-10-CM

## 2018-05-14 DIAGNOSIS — I48.0 PAF (PAROXYSMAL ATRIAL FIBRILLATION) (HCC): ICD-10-CM

## 2018-05-14 DIAGNOSIS — Z13.6 SCREENING FOR CARDIOVASCULAR CONDITION: ICD-10-CM

## 2018-05-14 DIAGNOSIS — I34.0 MODERATE MITRAL REGURGITATION: ICD-10-CM

## 2018-05-14 DIAGNOSIS — Z12.5 SCREENING FOR MALIGNANT NEOPLASM OF PROSTATE: ICD-10-CM

## 2018-05-14 PROCEDURE — 99396 PREV VISIT EST AGE 40-64: CPT | Performed by: INTERNAL MEDICINE

## 2018-05-14 NOTE — PROGRESS NOTES
ASSESSMENT and PLAN:  Dinah James is a 65 y o  male with:   Problem List Items Addressed This Visit     Moderate mitral regurgitation    PAF (paroxysmal atrial fibrillation) (HCC)    Screening for malignant neoplasm of prostate - Primary    Relevant Orders    PSA, Total Screen    Screening for cardiovascular condition    Relevant Orders    Lipid panel    Comprehensive metabolic panel        Hx of sarcoid- no sx at present  SUBJECTIVE:  Dinah James is a 65 y o  male who presents today with a chief complaint of Well Check    Patient here for physical   He has seen cardiology-  He had loop recorder placed  He is on cpap, he is on tikosyn for paroxysmal a fib  He has moderate mitral regurg- had lorie and 3d lorie as well as cardiac cath =- not candidate for repair at this point; he is followed for his a fib  He has loop recorder  He is asymtomatic at current time  Review of Systems   Constitutional: Negative  HENT: Negative  Eyes: Negative  Respiratory: Negative  Cardiovascular: Negative  Gastrointestinal: Negative  Endocrine: Negative  Genitourinary: Negative  Musculoskeletal: Negative  Skin: Negative  Allergic/Immunologic: Negative  Neurological: Negative  Hematological: Negative  Psychiatric/Behavioral: Negative  I have reviewed the patient's PMH, Social History, Medication List and Allergies  OBJECTIVE:  /70   Pulse 80   Temp 97 8 °F (36 6 °C)   Resp 16   Ht 6' (1 829 m)   Wt 87 8 kg (193 lb 9 6 oz)   BMI 26 26 kg/m²   Physical Exam   Constitutional: He is oriented to person, place, and time  He appears well-developed and well-nourished  HENT:   Head: Normocephalic  Right Ear: External ear normal    Left Ear: External ear normal    Nose: Nose normal    Mouth/Throat: Oropharynx is clear and moist    Eyes: Conjunctivae and EOM are normal  Pupils are equal, round, and reactive to light  Right eye exhibits no discharge   Left eye exhibits no discharge  Neck: Normal range of motion  Neck supple  No JVD present  No tracheal deviation present  No thyromegaly present  Pulmonary/Chest: Effort normal and breath sounds normal  No respiratory distress  He has no wheezes  He has no rales  Abdominal: Soft  Bowel sounds are normal  He exhibits no distension  There is no tenderness  There is no rebound  Musculoskeletal: Normal range of motion  He exhibits no edema or tenderness  Neurological: He is alert and oriented to person, place, and time  He has normal reflexes  No cranial nerve deficit  Coordination normal    Skin: Skin is warm and dry  No rash noted  No erythema  No pallor  Psychiatric: He has a normal mood and affect   His behavior is normal  Judgment and thought content normal

## 2018-05-16 ENCOUNTER — TRANSCRIBE ORDERS (OUTPATIENT)
Dept: LAB | Facility: CLINIC | Age: 58
End: 2018-05-16

## 2018-05-16 ENCOUNTER — APPOINTMENT (OUTPATIENT)
Dept: LAB | Facility: CLINIC | Age: 58
End: 2018-05-16
Payer: COMMERCIAL

## 2018-05-16 DIAGNOSIS — Z12.5 SCREENING FOR MALIGNANT NEOPLASM OF PROSTATE: ICD-10-CM

## 2018-05-16 DIAGNOSIS — Z00.00 ROUTINE HEALTH MAINTENANCE: ICD-10-CM

## 2018-05-16 DIAGNOSIS — Z13.6 SCREENING FOR CARDIOVASCULAR CONDITION: ICD-10-CM

## 2018-05-16 LAB
ALBUMIN SERPL BCP-MCNC: 3.5 G/DL (ref 3.5–5)
ALP SERPL-CCNC: 56 U/L (ref 46–116)
ALT SERPL W P-5'-P-CCNC: 24 U/L (ref 12–78)
ANION GAP SERPL CALCULATED.3IONS-SCNC: 7 MMOL/L (ref 4–13)
AST SERPL W P-5'-P-CCNC: 15 U/L (ref 5–45)
BILIRUB SERPL-MCNC: 0.8 MG/DL (ref 0.2–1)
BUN SERPL-MCNC: 17 MG/DL (ref 5–25)
CALCIUM SERPL-MCNC: 8.4 MG/DL (ref 8.3–10.1)
CHLORIDE SERPL-SCNC: 105 MMOL/L (ref 100–108)
CHOLEST SERPL-MCNC: 153 MG/DL (ref 50–200)
CO2 SERPL-SCNC: 28 MMOL/L (ref 21–32)
CREAT SERPL-MCNC: 1.16 MG/DL (ref 0.6–1.3)
GFR SERPL CREATININE-BSD FRML MDRD: 70 ML/MIN/1.73SQ M
GLUCOSE P FAST SERPL-MCNC: 92 MG/DL (ref 65–99)
HDLC SERPL-MCNC: 34 MG/DL (ref 40–60)
LDLC SERPL CALC-MCNC: 98 MG/DL (ref 0–100)
NONHDLC SERPL-MCNC: 119 MG/DL
POTASSIUM SERPL-SCNC: 4.6 MMOL/L (ref 3.5–5.3)
PROT SERPL-MCNC: 6.3 G/DL (ref 6.4–8.2)
PSA SERPL-MCNC: 0.7 NG/ML (ref 0–4)
SODIUM SERPL-SCNC: 140 MMOL/L (ref 136–145)
TRIGL SERPL-MCNC: 106 MG/DL

## 2018-05-16 PROCEDURE — G0103 PSA SCREENING: HCPCS

## 2018-05-16 PROCEDURE — 80061 LIPID PANEL: CPT

## 2018-05-16 PROCEDURE — 36415 COLL VENOUS BLD VENIPUNCTURE: CPT

## 2018-05-16 PROCEDURE — 80053 COMPREHEN METABOLIC PANEL: CPT

## 2018-06-29 ENCOUNTER — OFFICE VISIT (OUTPATIENT)
Dept: CARDIOLOGY CLINIC | Facility: CLINIC | Age: 58
End: 2018-06-29
Payer: COMMERCIAL

## 2018-06-29 VITALS
DIASTOLIC BLOOD PRESSURE: 74 MMHG | OXYGEN SATURATION: 97 % | HEART RATE: 64 BPM | SYSTOLIC BLOOD PRESSURE: 122 MMHG | BODY MASS INDEX: 27.52 KG/M2 | WEIGHT: 203.2 LBS | HEIGHT: 72 IN

## 2018-06-29 DIAGNOSIS — I63.139 CEREBROVASCULAR ACCIDENT (CVA) DUE TO EMBOLISM OF CAROTID ARTERY, UNSPECIFIED BLOOD VESSEL LATERALITY (HCC): ICD-10-CM

## 2018-06-29 DIAGNOSIS — R42 ORTHOSTATIC DIZZINESS: ICD-10-CM

## 2018-06-29 DIAGNOSIS — I10 BENIGN ESSENTIAL HYPERTENSION: ICD-10-CM

## 2018-06-29 DIAGNOSIS — I77.810 AORTIC ROOT DILATATION (HCC): ICD-10-CM

## 2018-06-29 DIAGNOSIS — I34.0 MODERATE MITRAL REGURGITATION: ICD-10-CM

## 2018-06-29 DIAGNOSIS — I34.1 MITRAL VALVE PROLAPSE: ICD-10-CM

## 2018-06-29 DIAGNOSIS — I48.0 PAF (PAROXYSMAL ATRIAL FIBRILLATION) (HCC): Primary | ICD-10-CM

## 2018-06-29 DIAGNOSIS — I48.91 ATRIAL FIBRILLATION, UNSPECIFIED TYPE (HCC): ICD-10-CM

## 2018-06-29 PROBLEM — Z13.6 SCREENING FOR CARDIOVASCULAR CONDITION: Status: RESOLVED | Noted: 2018-05-14 | Resolved: 2018-06-29

## 2018-06-29 PROBLEM — I63.9 CEREBROVASCULAR ACCIDENT (CVA) (HCC): Status: ACTIVE | Noted: 2017-02-27

## 2018-06-29 PROCEDURE — 93000 ELECTROCARDIOGRAM COMPLETE: CPT | Performed by: INTERNAL MEDICINE

## 2018-06-29 PROCEDURE — 99214 OFFICE O/P EST MOD 30 MIN: CPT | Performed by: INTERNAL MEDICINE

## 2018-06-29 RX ORDER — DOFETILIDE 0.5 MG/1
500 CAPSULE ORAL EVERY 12 HOURS SCHEDULED
Qty: 180 CAPSULE | Refills: 3 | Status: SHIPPED | OUTPATIENT
Start: 2018-06-29 | End: 2018-11-06 | Stop reason: SDUPTHER

## 2018-06-29 NOTE — PROGRESS NOTES
Follow-up - Cardiology   Aram Elizabeth 65 y o  male MRN: [id number]        Problems    1  PAF (paroxysmal atrial fibrillation) (HCC)  POCT ECG    apixaban (ELIQUIS) 5 mg   2  Moderate mitral regurgitation     3  Aortic root dilatation (HCC)     4  Mitral valve prolapse     5  Benign essential hypertension     6  Cerebrovascular accident (CVA) due to embolism of carotid artery, unspecified blood vessel laterality ([address] )     7  Orthostatic dizziness     8  Atrial fibrillation, unspecified type (HCC)  dofetilide (TIKOSYN) 500 mcg capsule       Impression:    Saturnino Hutchins follows up with me at the [facility] office  Atrial fibrillation-paroxysmal, in normal sinus rhythm, on a rhythm control strategy with Tikosyn,  milliseconds, he takes Eliquis for stroke prevention as secondary prevention strategy  Aortic root enlargement - 42 mm, empirically on metoprolol, but having some episodic orthostatic lightheadedness, considering blood pressures are normal, we talked about discontinuing metoprolol  Mitral regurgitation- prolapse of the mitral valve A2/P2 leaflets, previously this was moderate, based on last echocardiogram only felt to be mild  He is not symptomatic with regards to the mitral valve      Plan:    Orders Placed This Encounter   Procedures    POCT ECG      stop metoprolol due to the orthostatic lightheadedness  Home blood pressure checks, call me if the blood pressures start to elevate above 130/85  We will try to treat blood pressure in the future with an ACE-inhibitor in place of metoprolol which is likely to be more effective for aortic root enlargement prevention    HPI:   Aram Elizabeth is a 65y o  year old male  With history of stroke, embolic, with discovery of atrial fibrillation, currently with a loop recorder in place  He has hypertension, he has been on low-dose metoprolol  Blood pressures are normal and he complains of orthostatic lightheadedness    He has had no symptoms of AFib   He is on a rhythm control strategy with Tikosyn  He is in normal sinus rhythm today with a normal QTC  He is currently on 500 mg of Tikosyn twice a day  He has a mild aortic root enlargement at 42 mm, blood pressures are well controlled, and he also has mitral valve prolapse with previous suggestion of moderate MR, but last echo 2/18 suggested only trace MR  He denies any dyspnea on exertion  He gave up his job at [employer], now only working 40 hours a week instead of 60 hours a week and getting much better sleep  However he is starting to gain some weight  Review of Systems   Neurological: Positive for light-headedness ( with standing)  All other systems reviewed and are negative          Past Medical History:   Diagnosis Date    Allergic rhinitis     Aortic root dilatation (HCC)     Asthma     Benign essential hypertension     Cervical lymphadenopathy     CKD (chronic kidney disease)     [facility]A [facility]re[facility]     Elevated blood pressure reading     last assessed 01/08/14    Erectile dysfunction of non-organic origin     Fluttering heart     Hypercalcemia     last assessed 05/19/16    Hypertension     [facility]     Mitral valve prolapse     Moderate mitral regurgitation     [facility]     Palpitations     last assessed 03/29/13    Renal insufficiency     last assessed 09/23/13    Sarcoidosis     Sarcoidosis     TIA (transient ischemic attack)      History   Alcohol Use    Yes     Comment: rare     History   Drug Use No     History   Smoking Status    Never Smoker   Smokeless Tobacco    Never Used       Allergies:  No Known Allergies    Medications:     Current Outpatient Prescriptions:     apixaban (ELIQUIS) 5 mg, Take 5 mg by mouth 2 (two) times a day, Disp: , Rfl:     Ascorbic Acid (VITAMIN C) 1000 MG tablet, Take 1,000 Doses by mouth, Disp: , Rfl:     BIOTIN PO, Take 1 capsule by mouth daily, Disp: , Rfl:     dofetilide (TIKOSYN) 500 mcg capsule, Take 1 capsule (500 mcg total) by mouth every 12 (twelve) hours, Disp: 60 capsule, Rfl: 3    Ferrous Sulfate (IRON) 325 (65 Fe) MG TABS, Take 1 Dose by mouth 3 (three) times a week QOD, Disp: , Rfl:     fluticasone (FLONASE) 50 mcg/act nasal spray, 50 sprays into each nostril daily, Disp: , Rfl:     Glucosamine HCl (GLUCOSAMINE PO), Take 1 capsule by mouth, Disp: , Rfl:     metoprolol succinate (TOPROL-XL) 25 mg 24 hr tablet, Take 25 mg by mouth daily  , Disp: , Rfl:     VITAMIN B COMPLEX-C PO, Take 1 Dose by mouth, Disp: , Rfl:     vitamin E, tocopherol, 400 units capsule, Take 1 Dose by mouth daily, Disp: , Rfl:       Vitals:    06/29/18 0754   BP: 122/74   Pulse: 64   SpO2: 97%     Weight (last 2 days)     Date/Time   Weight    06/29/18 0754  92 2 (203 2)            Physical Exam   Constitutional: He is oriented to person, place, and time  No distress  HENT:   Head: Normocephalic and atraumatic  Eyes: Conjunctivae are normal  No scleral icterus  Neck: Normal range of motion  No JVD present  Cardiovascular: Normal rate, regular rhythm, normal heart sounds and intact distal pulses  No murmur heard  Pulmonary/Chest: Effort normal and breath sounds normal  He has no wheezes  He has no rales  Musculoskeletal: He exhibits no edema  Neurological: He is alert and oriented to person, place, and time  Skin: Skin is warm and dry  He is not diaphoretic           Laboratory Studies:  Lab Results   Component Value Date    HGBA1C 5 3 06/30/2017    HGBA1C 5 2 02/20/2017    HGBA1C 5 3 07/25/2016     05/16/2018     09/08/2017     07/07/2017     11/11/2015     06/27/2015     11/10/2014    K 4 6 05/16/2018    K 4 0 09/08/2017    K 3 8 07/07/2017    K 4 0 11/11/2015    K 4 1 06/27/2015    K 4 0 11/10/2014     05/16/2018     09/08/2017     07/07/2017     11/11/2015     06/27/2015     11/10/2014 CO2 28 2018    CO2 28 2017    CO2 29 2017    CO2 28 2015    CO2 28 2015    CO2 28 11/10/2014    GLUCOSE 84 2017    GLUCOSE 92 2017    GLUCOSE 92 2017    GLUCOSE 81 2015    GLUCOSE 93 2015    GLUCOSE 93 11/10/2014    CREATININE 1 16 2018    CREATININE 1 20 2017    CREATININE 1 08 2017    CREATININE 1 26 2015    CREATININE 1 17 2015    CREATININE 1 13 11/10/2014    BUN 17 2018    BUN 20 2017    BUN 12 2017    BUN 19 2015    BUN 19 2015    BUN 15 11/10/2014    MG 1 9 2017    MG 2 2 2017    MG 2 1 2017     Lab Results   Component Value Date    WBC 5 45 2017    WBC 6 11 2015    RBC 4 69 2017    RBC 4 88 2015    HGB 14 2 2017    HGB 14 0 2015    HCT 41 3 2017    HCT 41 7 2015    MCV 88 2017    MCV 86 2015    MCH 30 3 2017    MCH 28 7 2015    RDW 12 9 2017    RDW 14 4 2015     2017     2015     NT-proBNP: No results for input(s): NTBNP in the last 72 hours     Coags:    Lipid Profile:   Lab Results   Component Value Date    CHOL 153 2018     Lab Results   Component Value Date    HDL 34 (L) 2018     Lab Results   Component Value Date    LDLCALC 98 2018     Lab Results   Component Value Date    TRIG 106 2018       Cardiac testing:   EKG reviewed personally:  Normal sinus rhythm, normal QTC  Results for orders placed during the hospital encounter of 18   Echo complete with contrast if indicated    Narrative  [address] [place] [address]  [phone]    Echocardiogram    Study date:  2018    Patient: Ariella Little  MR number: [id number]  Account number: [de-identified]  : 1960  Age: 65 years  Gender: Male  Status: Outpatient  Location:  Height: 71 in  Weight: 195 6 lb  BP:    Primary Physician:  Drea Pérez Hiral Terrazas DO  Referring Physician:  Christopher Hernandez MD  Interpreting Physician:  Goyo Morales MD    SUMMARY    LEFT VENTRICLE:  Systolic function was normal  Ejection fraction was estimated to be 60 %  There were no regional wall motion abnormalities  Doppler parameters were consistent with abnormal left ventricular relaxation (grade 1 diastolic dysfunction)  RIGHT VENTRICLE:  The size was normal   Systolic function was normal     LEFT ATRIUM:  The atrium was mildly dilated  MITRAL VALVE:  There was systolic bowing of the anterior and posterior leaflets  There was trace regurgitation  AORTIC VALVE:  There was trace regurgitation  AORTA:  The root exhibited mild dilatation at 4 2 cm in anteroposterior diamter  There was mild dilatation of the ascending aorta at 4 cm in anteroposterior diamter  LEFT VENTRICLE: Size was normal  Systolic function was normal  Ejection fraction was estimated to be 60 %  There were no regional wall motion abnormalities  Wall thickness was normal  DOPPLER: Doppler parameters were consistent with  abnormal left ventricular relaxation (grade 1 diastolic dysfunction)  RIGHT VENTRICLE: The size was normal  Systolic function was normal  Wall thickness was normal     LEFT ATRIUM: The atrium was mildly dilated  RIGHT ATRIUM: Size was at the upper limits of normal     MITRAL VALVE: Valve structure was normal  There was normal leaflet separation  There was systolic bowing of the anterior and posterior leaflets  DOPPLER: The transmitral velocity was within the normal range  There was no evidence for  stenosis  There was trace regurgitation  AORTIC VALVE: The valve was trileaflet  Leaflets exhibited normal thickness and normal cuspal separation  DOPPLER: Transaortic velocity was within the normal range  There was no evidence for stenosis  There was trace regurgitation  TRICUSPID VALVE: The valve structure was normal  There was normal leaflet separation   DOPPLER: The transtricuspid velocity was within the normal range  There was no evidence for stenosis  There was no significant regurgitation  PULMONIC VALVE: Leaflets exhibited normal thickness, no calcification, and normal cuspal separation  DOPPLER: The transpulmonic velocity was within the normal range  There was no significant regurgitation  PERICARDIUM: There was no pericardial effusion  The pericardium was normal in appearance  AORTA: The root exhibited mild dilatation at 4 2 cm in anteroposterior diamter  There was mild dilatation of the ascending aorta at 4 cm in anteroposterior diamter  SYSTEMIC VEINS: IVC: The inferior vena cava was normal in size  Respirophasic changes were normal     SYSTEM MEASUREMENT TABLES    2D  %FS: 38 79 %  EF(Teich): 68 78 %  IVSd: 1 02 cm  LA Diam: 4 39 cm  LVIDd: 5 2 cm  LVIDs: 3 19 cm  LVPWd: 0 97 cm    MM  TAPSE: 2 2 cm    Inters\[facility]\"" Commission Accredited Echocardiography Laboratory    Prepared and electronically signed by    Fred Briones MD  Signed 2018 12:15:13       Results for orders placed during the hospital encounter of 17   HOPE    Narrative [facility]  [facility], [address] [facility]  [phone]    Transesophageal Echocardiogram  2D, Doppler, and Color Doppler    Study date:  24-Mar-2017    Patient: Fahad Torres  MR number: [id number]  Account number: [de-identified]  : 1960  Age: 55 years  Gender: Male  Status: Outpatient  Location: Cath lab  Height: 72 in  Weight: 184 lb  BP: 152/ 88 mmHg    Indications: AFIB    Diagnoses: I48 0 - Atrial fibrillation    Sonographer:  FERNANDO Johnson  Interpreting Physician:  Helen Mckinnon MD  Primary Physician:  Clayton Garcia DO  Referring Physician:  Roberto Rolon DO  Group:  [facility]    SUMMARY    LEFT VENTRICLE:  Systolic function was normal  Ejection fraction was estimated to be 60 %    Although no diagnostic regional wall motion abnormality was identified, this possibility cannot be completely excluded on the basis of this study  Wall thickness was increased  ATRIAL SEPTUM:  No defect or patent foramen ovale was identified after injection of agitated saline  MITRAL VALVE:  There was mild-moderate thickening  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  There was prolapse involving the anterior and posterior leaflets  There was moderate late systolic regurgitation  No evidence of systolic flow reversal in the pulmonary veins to suggest more significant regurgitation  AORTIC VALVE:  There was trace to mild regurgitation  AORTA:  The root was top normal size at 3 9 cm  HISTORY: PRIOR HISTORY: CKD, Sarcoidosis    PROCEDURE: The procedure was performed in the catheterization laboratory  This was a routine study  The risks and alternatives of the procedure were explained to the patient and informed consent was obtained  The transesophageal approach  was used  The study included complete 2D imaging, limited spectral Doppler, and color Doppler  The heart rate was 95 bpm, at the start of the study  An adult omniplane probe was inserted by the attending cardiologist  Intubated with ease  One intubation attempt(s)  There was no blood detected on the probe  Image quality was adequate  There were no complications during the procedure  MEDICATIONS: Anesthesia administered by anesthesia team     LEFT VENTRICLE: Size was normal  Systolic function was normal  Ejection fraction was estimated to be 60 %  Although no diagnostic regional wall motion abnormality was identified, this possibility cannot be completely excluded on the basis  of this study  Wall thickness was increased  DOPPLER: The ratio of early ventricular filling to atrial contraction velocities was within the normal range  RIGHT VENTRICLE: The size was normal  Systolic function was normal     LEFT ATRIUM: The atrium was mildly dilated   APPENDAGE: The size was normal  No thrombus was identified  DOPPLER: The function was normal (normal emptying velocity)  ATRIAL SEPTUM: No defect or patent foramen ovale was identified after injection of agitated saline  RIGHT ATRIUM: Size was normal  No thrombus was identified  MITRAL VALVE: There was mild-moderate thickening  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  There was prolapse involving the anterior and posterior leaflets  DOPPLER: There was moderate late systolic regurgitation  No evidence of systolic flow reversal in the pulmonary veins to suggest more significant regurgitation  AORTIC VALVE: The valve was trileaflet  Leaflets exhibited normal thickness and normal cuspal separation  There was no echocardiographic evidence of vegetation  DOPPLER: There was trace to mild regurgitation  TRICUSPID VALVE: The valve structure was normal  There was normal leaflet separation  There was no echocardiographic evidence of vegetation  DOPPLER: There was trace regurgitation  PULMONIC VALVE: Leaflets exhibited normal thickness, no calcification, and normal cuspal separation  There was no echocardiographic evidence of vegetation  PERICARDIUM: There was no pericardial effusion  The pericardium was normal in appearance  AORTA: The root was top normal size at 3 9 cm  There was no atheroma  There was no evidence for dissection  There was no evidence for aneurysm      [address] Accredited Echocardiography Laboratory    Prepared and electronically signed by    Soni Bejarano MD  Signed 24-Mar-2017 16:49:30       Results for orders placed during the hospital encounter of 05/25/17   Cardiac catheterization    [address]  [phone]    [facility]    Invasive Cardiovascular Lab Complete Report    Patient: Fredi Vela  MR number: [id number]  Account number: [de-identified]  Study date: 05/25/2017  Gender: Male  : 1960  Height: 70 9 in  Weight: 187 7 lb  BSA: 2 05 m squared    Allergies: NO KNOWN ALLERGIES    Diagnostic Cardiologist:  Ford Moore MD  Primary Physician:  DO JIMMIE Rincon    CORONARY CIRCULATION:  Left main: Normal   LAD: Normal   Circumflex: Normal   RCA: Normal     CARDIAC STRUCTURES:  Global left ventricular function was normal  EF calculated by contrast ventriculography was 60 %  INDICATIONS:  --  Cardiac: mitral valve disease  PROCEDURES PERFORMED    --  Left heart catheterization with ventriculography  --  Left coronary angiography  --  Right coronary angiography  --  Outpatient  --  Mod Sedation Same Physician Initial 15min  --  Mod Sedation Same Physician Add 15min  --  Coronary Catheterization (w/ LHC)  PROCEDURE: The risks and alternatives of the procedures and conscious sedation were explained to the patient and informed consent was obtained  The patient was brought to the cath lab and placed on the table  The planned puncture sites  were prepped and draped in the usual sterile fashion     --  Left heart catheterization with ventriculography  A catheter was advanced over a guidewire into the ascending aorta  After recording ascending aortic pressure, the catheter was advanced across the aortic valve and left ventricular  pressure was recorded  Ventriculography was performed  The catheter was pulled back across the aortic valve and into the ascending aorta and pullback pressures were obtained  --  Left coronary artery angiography  A catheter was advanced over a guidewire into the aorta and positioned in the left coronary artery ostium under fluoroscopic guidance  Angiography was performed  --  Right coronary artery angiography  A catheter was advanced over a guidewire into the aorta and positioned in the right coronary artery ostium under fluoroscopic guidance  Angiography was performed  --  Outpatient      --  Mod Sedation Same Physician Initial 15min     --  Mod Sedation Same Physician Add 15min  --  Coronary Catheterization (w/ [facility])  PROCEDURE COMPLETION: The patient tolerated the procedure well and was discharged from the cath lab  TIMING: Test started at 15:55  Test concluded at 16:24  HEMOSTASIS: The sheath was removed  The site was compressed with a Hemoband  device  Hemostasis was obtained  MEDICATIONS GIVEN: Verapamil (Isoptin, Calan, Covera), 2 5 mg, IA, at 16:03  Versed (2mg/2ml), 2 mg, IV, at 15:55  Fentanyl (1OOmcg/2 ml), 50 mcg, IV, at 15:55  1% Lidocaine, 1 ml, subcutaneously, at 16:03  Nitroglycerin (200mcg/ml), 200 mcg, at 16:03  Heparin 1000 units/ml, 4,000 units, IV, at 16:03  Versed (2mg/2ml), 1 mg, IV, at 16:04  Fentanyl (1OOmcg/2 ml), 50 mcg, IV, at 16:04  Versed (2mg/2ml), 1 mg, IV, at 16:12  Fentanyl (1OOmcg/2  ml), 50 mcg, IV, at 16:13  CONTRAST GIVEN: 44 ml Omnipaque (350mg I/ml)  50 ml Omnipaque (350mg I /ml)  RADIATION EXPOSURE: Fluoroscopy time: 7 88 min  HEMODYNAMICS: Hemodynamic assessment demonstrated normal LVEDP  VENTRICLES:   --  Global left ventricular function was normal  EF calculated by contrast ventriculography was 60 %  CORONARY VESSELS:   --  The coronary circulation is right dominant  --  Left main: Normal   --  LAD: Normal   --  Circumflex: Normal   --  RCA: Normal     IMPRESSIONS:  There is no significant coronary artery disease    Left ventricular function is normal     Prepared and signed by    Juan Daniel Khan MD  Signed 05/25/2017 16:29:09    Study diagram    Hemodynamic tables    Pressures:  Baseline  Pressures:  - HR: 86  Pressures:  - Rhythm:  Pressures:  -- Aortic Pressure (S/D/M): 114/69/85  Pressures:  -- Left Ventricle (s/edp): 118/21/--  Pressures:  -- Left Ventricle (s/edp): 118/18/--    Outputs:  Baseline  Outputs:  -- CALCULATIONS[age]  Outputs:  -- CALCULATIONS: Body Surface Area: 2 05  Outputs:  -- CALCULATIONS: Height in cm: 180 00  Outputs:  -- CALCULATIONS: Sex: Male  Outputs:  -- CALCULATIONS: Weight in k 30       No results found for this or any previous visit  Jaqueline Arnold MD    Portions of the record may have been created with voice recognition software   Occasional wrong word or "sound a like" substitutions may have occurred due to the inherent limitations of voice recognition software   Read the chart carefully and recognize, using context, where substitutions have occurred

## 2018-08-22 ENCOUNTER — REMOTE DEVICE CLINIC VISIT (OUTPATIENT)
Dept: CARDIOLOGY CLINIC | Facility: CLINIC | Age: 58
End: 2018-08-22
Payer: COMMERCIAL

## 2018-08-22 DIAGNOSIS — Z86.73 PERSONAL HISTORY OF TRANSIENT ISCHEMIC ATTACK: Primary | ICD-10-CM

## 2018-08-22 DIAGNOSIS — Z95.818 PRESENCE OF OTHER CARDIAC IMPLANTS AND GRAFTS: ICD-10-CM

## 2018-08-22 PROCEDURE — 93298 REM INTERROG DEV EVAL SCRMS: CPT | Performed by: INTERNAL MEDICINE

## 2018-08-22 PROCEDURE — 93299 PR REM INTERROG ICPMS/SCRMS <30 D TECH REVIEW: CPT | Performed by: INTERNAL MEDICINE

## 2018-08-22 NOTE — PROGRESS NOTES
MDT LOOP  CARELINK TRANSMISSION: LOOP RECORDER  PRESENTING RHYTHM NSR @ 91 BPM  BATTERY STATUS "OK"  NO PATIENT OR DEVICE ACTIVATED EPISODES  NORMAL DEVICE FUNCTION   DL

## 2018-08-27 ENCOUNTER — TELEPHONE (OUTPATIENT)
Dept: CARDIOLOGY CLINIC | Facility: CLINIC | Age: 58
End: 2018-08-27

## 2018-08-27 NOTE — TELEPHONE ENCOUNTER
Pt seen in June, metoprolol was discontinued  Called today with a BP update  Said last week he started feeling "different" and noticed BP of 154/83 which was 4 days ago  Since then, readings of 154/82, 148/84, 139/78, 143/77  HR has been in the 70's  Due for f/u in Dec   Please advise

## 2018-08-29 DIAGNOSIS — I10 ESSENTIAL HYPERTENSION: Primary | ICD-10-CM

## 2018-08-29 NOTE — TELEPHONE ENCOUNTER
Spoke with pt and advised  Script sent to pharmacy for Toprol XL  Pt will continue to monitor his BP  Verbalized understanding

## 2018-08-31 RX ORDER — METOPROLOL SUCCINATE 25 MG/1
12.5 TABLET, EXTENDED RELEASE ORAL DAILY
Qty: 45 TABLET | Refills: 3 | Status: SHIPPED | OUTPATIENT
Start: 2018-08-31 | End: 2019-06-11 | Stop reason: SDUPTHER

## 2018-10-10 ENCOUNTER — IMMUNIZATION (OUTPATIENT)
Dept: FAMILY MEDICINE CLINIC | Facility: CLINIC | Age: 58
End: 2018-10-10
Payer: COMMERCIAL

## 2018-10-10 DIAGNOSIS — Z23 NEED FOR INFLUENZA VACCINATION: Primary | ICD-10-CM

## 2018-10-10 PROCEDURE — 90471 IMMUNIZATION ADMIN: CPT

## 2018-10-10 PROCEDURE — 90686 IIV4 VACC NO PRSV 0.5 ML IM: CPT

## 2018-11-02 DIAGNOSIS — I48.91 ATRIAL FIBRILLATION, UNSPECIFIED TYPE (HCC): ICD-10-CM

## 2018-11-05 ENCOUNTER — TELEPHONE (OUTPATIENT)
Dept: CARDIOLOGY CLINIC | Facility: CLINIC | Age: 58
End: 2018-11-05

## 2018-11-06 ENCOUNTER — TELEPHONE (OUTPATIENT)
Dept: CARDIOLOGY CLINIC | Facility: CLINIC | Age: 58
End: 2018-11-06

## 2018-11-06 DIAGNOSIS — I48.91 ATRIAL FIBRILLATION, UNSPECIFIED TYPE (HCC): ICD-10-CM

## 2018-11-06 RX ORDER — DOFETILIDE 0.5 MG/1
500 CAPSULE ORAL EVERY 12 HOURS SCHEDULED
Qty: 180 CAPSULE | Refills: 3 | Status: SHIPPED | OUTPATIENT
Start: 2018-11-06 | End: 2018-11-09 | Stop reason: SDUPTHER

## 2018-11-06 RX ORDER — DOFETILIDE 0.5 MG/1
500 CAPSULE ORAL EVERY 12 HOURS SCHEDULED
Qty: 180 CAPSULE | Refills: 3 | Status: CANCELLED | OUTPATIENT
Start: 2018-11-06

## 2018-11-06 NOTE — TELEPHONE ENCOUNTER
Patient needs Tykosin resubmitted to [facility], as soon as possible he only has enough til thursday

## 2018-11-07 ENCOUNTER — TELEPHONE (OUTPATIENT)
Dept: CARDIOLOGY CLINIC | Facility: CLINIC | Age: 58
End: 2018-11-07

## 2018-11-09 DIAGNOSIS — I48.91 ATRIAL FIBRILLATION, UNSPECIFIED TYPE (HCC): ICD-10-CM

## 2018-11-09 RX ORDER — DOFETILIDE 0.5 MG/1
500 CAPSULE ORAL EVERY 12 HOURS SCHEDULED
Qty: 180 CAPSULE | Refills: 3 | Status: SHIPPED | OUTPATIENT
Start: 2018-11-09 | End: 2018-11-09 | Stop reason: SDUPTHER

## 2018-11-09 RX ORDER — DOFETILIDE 0.5 MG/1
500 CAPSULE ORAL EVERY 12 HOURS SCHEDULED
Qty: 180 CAPSULE | Refills: 3 | Status: SHIPPED | OUTPATIENT
Start: 2018-11-09 | End: 2018-12-04 | Stop reason: SDUPTHER

## 2018-11-09 NOTE — TELEPHONE ENCOUNTER
TIKOSYN 500 MCG CAPSULE  Pt would like a 90 day not sure if insurance will cover if you can check when you call it in    [place] [address]    Phone number [phone]

## 2018-11-15 ENCOUNTER — REMOTE DEVICE CLINIC VISIT (OUTPATIENT)
Dept: CARDIOLOGY CLINIC | Facility: CLINIC | Age: 58
End: 2018-11-15
Payer: COMMERCIAL

## 2018-11-15 DIAGNOSIS — I63.9 CRYPTOGENIC STROKE (HCC): Primary | ICD-10-CM

## 2018-11-15 DIAGNOSIS — Z45.09 ENCOUNTER FOR LOOP RECORDER CHECK: ICD-10-CM

## 2018-11-15 PROCEDURE — 93296 REM INTERROG EVL PM/IDS: CPT | Performed by: INTERNAL MEDICINE

## 2018-11-15 PROCEDURE — 93298 REM INTERROG DEV EVAL SCRMS: CPT | Performed by: INTERNAL MEDICINE

## 2018-12-04 ENCOUNTER — OFFICE VISIT (OUTPATIENT)
Dept: CARDIOLOGY CLINIC | Facility: CLINIC | Age: 58
End: 2018-12-04
Payer: COMMERCIAL

## 2018-12-04 ENCOUNTER — APPOINTMENT (OUTPATIENT)
Dept: LAB | Facility: CLINIC | Age: 58
End: 2018-12-04
Payer: COMMERCIAL

## 2018-12-04 VITALS
DIASTOLIC BLOOD PRESSURE: 84 MMHG | OXYGEN SATURATION: 97 % | HEART RATE: 85 BPM | SYSTOLIC BLOOD PRESSURE: 136 MMHG | BODY MASS INDEX: 26.3 KG/M2 | WEIGHT: 194.2 LBS | HEIGHT: 72 IN

## 2018-12-04 DIAGNOSIS — I34.0 MODERATE MITRAL REGURGITATION: ICD-10-CM

## 2018-12-04 DIAGNOSIS — I77.810 AORTIC ROOT DILATATION (HCC): ICD-10-CM

## 2018-12-04 DIAGNOSIS — I10 BENIGN ESSENTIAL HYPERTENSION: ICD-10-CM

## 2018-12-04 DIAGNOSIS — I48.91 ATRIAL FIBRILLATION, UNSPECIFIED TYPE (HCC): Primary | ICD-10-CM

## 2018-12-04 DIAGNOSIS — I48.0 PAF (PAROXYSMAL ATRIAL FIBRILLATION) (HCC): ICD-10-CM

## 2018-12-04 DIAGNOSIS — I34.1 MITRAL VALVE PROLAPSE: ICD-10-CM

## 2018-12-04 DIAGNOSIS — I63.139 CEREBROVASCULAR ACCIDENT (CVA) DUE TO EMBOLISM OF CAROTID ARTERY, UNSPECIFIED BLOOD VESSEL LATERALITY (HCC): ICD-10-CM

## 2018-12-04 LAB
ANION GAP SERPL CALCULATED.3IONS-SCNC: 4 MMOL/L (ref 4–13)
BUN SERPL-MCNC: 15 MG/DL (ref 5–25)
CALCIUM SERPL-MCNC: 9.1 MG/DL (ref 8.3–10.1)
CHLORIDE SERPL-SCNC: 106 MMOL/L (ref 100–108)
CO2 SERPL-SCNC: 31 MMOL/L (ref 21–32)
CREAT SERPL-MCNC: 1.17 MG/DL (ref 0.6–1.3)
GFR SERPL CREATININE-BSD FRML MDRD: 69 ML/MIN/1.73SQ M
GLUCOSE P FAST SERPL-MCNC: 104 MG/DL (ref 65–99)
POTASSIUM SERPL-SCNC: 4.4 MMOL/L (ref 3.5–5.3)
SODIUM SERPL-SCNC: 141 MMOL/L (ref 136–145)

## 2018-12-04 PROCEDURE — 99214 OFFICE O/P EST MOD 30 MIN: CPT | Performed by: INTERNAL MEDICINE

## 2018-12-04 PROCEDURE — 80048 BASIC METABOLIC PNL TOTAL CA: CPT

## 2018-12-04 PROCEDURE — 36415 COLL VENOUS BLD VENIPUNCTURE: CPT

## 2018-12-04 PROCEDURE — 93000 ELECTROCARDIOGRAM COMPLETE: CPT | Performed by: INTERNAL MEDICINE

## 2018-12-04 RX ORDER — DOFETILIDE 0.5 MG/1
500 CAPSULE ORAL EVERY 12 HOURS SCHEDULED
Qty: 180 CAPSULE | Refills: 3 | Status: SHIPPED | OUTPATIENT
Start: 2018-12-04 | End: 2019-06-11 | Stop reason: SDUPTHER

## 2018-12-04 NOTE — PROGRESS NOTES
Follow-up - Cardiology   Maribel Barahona 63 y o  male MRN: [id number]        Problems    1  Atrial fibrillation, unspecified type ([facility] [address] )  POCT ECG    dofetilide (TIKOSYN) 500 mcg capsule   2  Aortic root dilatation (HCC)     3  Mitral valve prolapse     4  Moderate mitral regurgitation     5  PAF (paroxysmal atrial fibrillation) ([facility] [address] )     6  Benign essential hypertension     7  Cerebrovascular accident (CVA) due to embolism of carotid artery, unspecified blood vessel laterality ([facility] [address] )         Impression:    Susan Fernandes follows up with me at the [facility] office  Atrial fibrillation  Paroxysmal  Rhythm control strategy with Tikosyn, normal QTC today  Loop recorder shows no AFib  On Eliquis for secondary stroke prevention      Aortic root enlargement  42 mm earlier this year  Continue to recommend good blood pressure control    Hypertension  Mildly elevated when we stop metoprolol, he is back on 12 5 mg Toprol, blood pressure reasonable    Mitral regurgitation  prolapse of the mitral valve A2/P2 leaflets, previously this was moderate, based on last echocardiogram only felt to be mild  He has no symptoms  No further testing needed for now      Plan:    Six-month follow-up  To see pulmonary with some concerns of symptoms similar to previous presentation of sarcoidosis  Check labs  Tikosyn refill provided, but he will reach back up to the office if there is any issue with his male in service  HPI:   Maribel Barahona is a 63y o  year old male with a history of embolic CVA who, paroxysmal atrial fibrillation, mitral valve prolapse, mitral valve regurgitation, hypertension, history of sarcoidosis presents with some fatigue, decreased appetite, symptoms she says are similar to prior sarcoidosis presentation any likely get into see pulmonary  He remains in normal sinus rhythm, his loop recorder shows no AFib, his blood pressure is well controlled on 12 5 mg of Toprol, it increased off of metoprolol completely    He has a mild aortic root enlargement of 42 mm  He denies chest pain, shortness of breath, lightheadedness, syncope, palpitations  Review of Systems   Constitutional: Positive for appetite change, fatigue and unexpected weight change  Respiratory: Negative for chest tightness and shortness of breath  Cardiovascular: Negative for chest pain, palpitations and leg swelling  Neurological: Positive for light-headedness           Past Medical History:   Diagnosis Date    Allergic rhinitis     Aortic root dilatation (HCC)     Asthma     Benign essential hypertension     Cervical lymphadenopathy     CKD (chronic kidney disease)     CVA (cerebral vascular accident) ([facility] [address] )     Elevated blood pressure reading     last assessed 01/08/14    Erectile dysfunction of non-organic origin     Fluttering heart     Hypercalcemia     last assessed 05/19/16    Hypertension     Lacunar stroke     Mitral valve prolapse     Moderate mitral regurgitation     [facility]ox[facility]     Palpitations     last assessed 03/29/13    Renal insufficiency     last assessed 09/23/13    Sarcoidosis     Sarcoidosis     TIA (transient ischemic attack)      History   Alcohol Use    Yes     Comment: rare     History   Drug Use No     History   Smoking Status    Never Smoker   Smokeless Tobacco    Never Used       Allergies:  No Known Allergies    Medications:     Current Outpatient Prescriptions:     apixaban (ELIQUIS) 5 mg, Take 1 tablet (5 mg total) by mouth 2 (two) times a day, Disp: 180 tablet, Rfl: 3    Ascorbic Acid (VITAMIN C) 1000 MG tablet, Take 1,000 Doses by mouth, Disp: , Rfl:     BIOTIN PO, Take 1 capsule by mouth daily, Disp: , Rfl:     dofetilide (TIKOSYN) 500 mcg capsule, Take 1 capsule (500 mcg total) by mouth every 12 (twelve) hours, Disp: 180 capsule, Rfl: 3    Ferrous Sulfate (IRON) 325 (65 Fe) MG TABS, Take 1 Dose by mouth 3 (three) times a week QOD, Disp: , Rfl:     fluticasone (FLONASE) 50 mcg/act nasal spray, 50 sprays into each nostril daily, Disp: , Rfl:     Glucosamine HCl (GLUCOSAMINE PO), Take 1 capsule by mouth, Disp: , Rfl:     metoprolol succinate (TOPROL-XL) 25 mg 24 hr tablet, Take 0 5 tablets (12 5 mg total) by mouth daily, Disp: 45 tablet, Rfl: 3    VITAMIN B COMPLEX-C PO, Take 1 Dose by mouth, Disp: , Rfl:     vitamin E, tocopherol, 400 units capsule, Take 1 Dose by mouth daily, Disp: , Rfl:       Vitals:    12/04/18 0747   BP: 136/84   Pulse: 85   SpO2: 97%     Weight (last 2 days)     Date/Time   Weight    12/04/18 0747  88 1 (194 2)            Physical Exam   Constitutional: He is oriented to person, place, and time  No distress  HENT:   Head: Normocephalic and atraumatic  Eyes: Conjunctivae are normal  No scleral icterus  Neck: Normal range of motion  No JVD present  Cardiovascular: Normal rate, regular rhythm, normal heart sounds and intact distal pulses  No murmur heard  Pulmonary/Chest: Effort normal and breath sounds normal  He has no wheezes  He has no rales  Musculoskeletal: He exhibits no edema  Neurological: He is alert and oriented to person, place, and time  Skin: Skin is warm and dry  He is not diaphoretic           Laboratory Studies:  Lab Results   Component Value Date    HGBA1C 5 3 06/30/2017    HGBA1C 5 2 02/20/2017    HGBA1C 5 3 07/25/2016     11/11/2015     06/27/2015     11/10/2014    K 4 6 05/16/2018    K 4 0 09/08/2017    K 3 8 07/07/2017    K 4 0 11/11/2015    K 4 1 06/27/2015    K 4 0 11/10/2014     05/16/2018     09/08/2017     07/07/2017     11/11/2015     06/27/2015     11/10/2014    CO2 28 05/16/2018    CO2 28 09/08/2017    CO2 29 07/07/2017    CO2 28 11/11/2015    CO2 28 06/27/2015    CO2 28 11/10/2014    GLUCOSE 81 11/11/2015    GLUCOSE 93 06/27/2015    GLUCOSE 93 11/10/2014    CREATININE 1 16 05/16/2018    CREATININE 1 20 09/08/2017    CREATININE 1 08 07/07/2017 CREATININE 1 26 2015    CREATININE 1 17 2015    CREATININE 1 13 11/10/2014    BUN 17 2018    BUN 20 2017    BUN 12 2017    BUN 19 2015    BUN 19 2015    BUN 15 11/10/2014    MG 1 9 2017    MG 2 2 2017    MG 2 1 2017     Lab Results   Component Value Date    WBC 5 45 2017    WBC 6 11 2015    RBC 4 69 2017    RBC 4 88 2015    HGB 14 2 2017    HGB 14 0 2015    HCT 41 3 2017    HCT 41 7 2015    MCV 88 2017    MCV 86 2015    MCH 30 3 2017    MCH 28 7 2015    RDW 12 9 2017    RDW 14 4 2015     2017     2015     NT-proBNP: No results for input(s): NTBNP in the last 72 hours  Coags:    Lipid Profile:   Lab Results   Component Value Date    CHOL 153 2015     Lab Results   Component Value Date    HDL 34 (L) 2018     Lab Results   Component Value Date    LDLCALC 98 2018     Lab Results   Component Value Date    TRIG 106 2018       Cardiac testing:   EKG reviewed personally: NSR, normal Qtc (443)    Results for orders placed during the hospital encounter of 18   Echo complete with contrast if indicated    Narrative [facility] [address] [place] [address]  [phone]    Echocardiogram    Study date:  2018    Patient: Clayton Bundy  MR number: [id number]  Account number: [de-identified]  : 1960  Age: 65 years  Gender: Male  Status: Outpatient  Location:  Height: 71 in  Weight: 195 6 lb  BP:    Primary Physician:  Karla Guevara DO  Referring Physician:  Greta Resendez MD  Interpreting Physician:  Meryle Peacemaker, MD    SUMMARY    LEFT VENTRICLE:  Systolic function was normal  Ejection fraction was estimated to be 60 %  There were no regional wall motion abnormalities  Doppler parameters were consistent with abnormal left ventricular relaxation (grade 1 diastolic dysfunction)      RIGHT VENTRICLE:  The size was normal   Systolic function was normal     LEFT ATRIUM:  The atrium was mildly dilated  MITRAL VALVE:  There was systolic bowing of the anterior and posterior leaflets  There was trace regurgitation  AORTIC VALVE:  There was trace regurgitation  AORTA:  The root exhibited mild dilatation at 4 2 cm in anteroposterior diamter  There was mild dilatation of the ascending aorta at 4 cm in anteroposterior diamter  LEFT VENTRICLE: Size was normal  Systolic function was normal  Ejection fraction was estimated to be 60 %  There were no regional wall motion abnormalities  Wall thickness was normal  DOPPLER: Doppler parameters were consistent with  abnormal left ventricular relaxation (grade 1 diastolic dysfunction)  RIGHT VENTRICLE: The size was normal  Systolic function was normal  Wall thickness was normal     LEFT ATRIUM: The atrium was mildly dilated  RIGHT ATRIUM: Size was at the upper limits of normal     MITRAL VALVE: Valve structure was normal  There was normal leaflet separation  There was systolic bowing of the anterior and posterior leaflets  DOPPLER: The transmitral velocity was within the normal range  There was no evidence for  stenosis  There was trace regurgitation  AORTIC VALVE: The valve was trileaflet  Leaflets exhibited normal thickness and normal cuspal separation  DOPPLER: Transaortic velocity was within the normal range  There was no evidence for stenosis  There was trace regurgitation  TRICUSPID VALVE: The valve structure was normal  There was normal leaflet separation  DOPPLER: The transtricuspid velocity was within the normal range  There was no evidence for stenosis  There was no significant regurgitation  PULMONIC VALVE: Leaflets exhibited normal thickness, no calcification, and normal cuspal separation  DOPPLER: The transpulmonic velocity was within the normal range  There was no significant regurgitation      PERICARDIUM: There was no pericardial effusion  The pericardium was normal in appearance  AORTA: The root exhibited mild dilatation at 4 2 cm in anteroposterior diamter  There was mild dilatation of the ascending aorta at 4 cm in anteroposterior diamter  SYSTEMIC VEINS: IVC: The inferior vena cava was normal in size  Respirophasic changes were normal     SYSTEM MEASUREMENT TABLES    2D  %FS: 38 79 %  EF(Teich): 68 78 %  IVSd: 1 02 cm  LA Diam: 4 39 cm  LVIDd: 5 2 cm  LVIDs: 3 19 cm  LVPWd: 0 97 cm    MM  TAPSE: 2 2 cm    [facility] Accredited Echocardiography Laboratory    Prepared and electronically signed by    Ivette Car MD  Signed 2018 12:15:13       Results for orders placed during the hospital encounter of 17   HOPE    Narrative [facility]  [facility] [facility]  [phone]    Transesophageal Echocardiogram  2D, Doppler, and Color Doppler    Study date:  24-Mar-2017    Patient: Akanksha Schmitz  MR number: [id number]  Account number: [de-identified]  : 1960  Age: 55 years  Gender: Male  Status: Outpatient  Location: Cath lab  Height: 72 in  Weight: 184 lb  BP: 152/ 88 mmHg    Indications: AFIB    Diagnoses: I48 0 - Atrial fibrillation    Sonographer:  FERNANDO Lucero  Interpreting Physician:  Mary Lou Jacobsen MD  Primary Physician:  Varsha Ling DO  Referring Physician:  Joan Dunbar DO  Group:  [facility]    SUMMARY    LEFT VENTRICLE:  Systolic function was normal  Ejection fraction was estimated to be 60 %  Although no diagnostic regional wall motion abnormality was identified, this possibility cannot be completely excluded on the basis of this study  Wall thickness was increased  ATRIAL SEPTUM:  No defect or patent foramen ovale was identified after injection of agitated saline  MITRAL VALVE:  There was mild-moderate thickening  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    There was prolapse involving the anterior and posterior leaflets  There was moderate late systolic regurgitation  No evidence of systolic flow reversal in the pulmonary veins to suggest more significant regurgitation  AORTIC VALVE:  There was trace to mild regurgitation  AORTA:  The root was top normal size at 3 9 cm  HISTORY: PRIOR HISTORY: CKD, Sarcoidosis    PROCEDURE: The procedure was performed in the catheterization laboratory  This was a routine study  The risks and alternatives of the procedure were explained to the patient and informed consent was obtained  The transesophageal approach  was used  The study included complete 2D imaging, limited spectral Doppler, and color Doppler  The heart rate was 95 bpm, at the start of the study  An adult omniplane probe was inserted by the attending cardiologist  Intubated with ease  One intubation attempt(s)  There was no blood detected on the probe  Image quality was adequate  There were no complications during the procedure  MEDICATIONS: Anesthesia administered by anesthesia team     LEFT VENTRICLE: Size was normal  Systolic function was normal  Ejection fraction was estimated to be 60 %  Although no diagnostic regional wall motion abnormality was identified, this possibility cannot be completely excluded on the basis  of this study  Wall thickness was increased  DOPPLER: The ratio of early ventricular filling to atrial contraction velocities was within the normal range  RIGHT VENTRICLE: The size was normal  Systolic function was normal     LEFT ATRIUM: The atrium was mildly dilated  APPENDAGE: The size was normal  No thrombus was identified  DOPPLER: The function was normal (normal emptying velocity)  ATRIAL SEPTUM: No defect or patent foramen ovale was identified after injection of agitated saline  RIGHT ATRIUM: Size was normal  No thrombus was identified  MITRAL VALVE: There was mild-moderate thickening   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  There was prolapse involving the anterior and posterior leaflets  DOPPLER: There was moderate late systolic regurgitation  No evidence of systolic flow reversal in the pulmonary veins to suggest more significant regurgitation  AORTIC VALVE: The valve was trileaflet  Leaflets exhibited normal thickness and normal cuspal separation  There was no echocardiographic evidence of vegetation  DOPPLER: There was trace to mild regurgitation  TRICUSPID VALVE: The valve structure was normal  There was normal leaflet separation  There was no echocardiographic evidence of vegetation  DOPPLER: There was trace regurgitation  PULMONIC VALVE: Leaflets exhibited normal thickness, no calcification, and normal cuspal separation  There was no echocardiographic evidence of vegetation  PERICARDIUM: There was no pericardial effusion  The pericardium was normal in appearance  AORTA: The root was top normal size at 3 9 cm  There was no atheroma  There was no evidence for dissection  There was no evidence for aneurysm      [address] Accredited Echocardiography Laboratory    Prepared and electronically signed by    Sandoval Pham MD  Signed 24-Mar-2017 16:49:30       Results for orders placed during the hospital encounter of 17   Cardiac catheterization    Narrative [address]  [phone]    [facility]    Invasive Cardiovascular Lab Complete Report    Patient: Jarred Wlikerson  MR number: [id number]  Account number: [de-identified]  Study date: 2017  Gender: Male  : 1960  Height: 70 9 in  Weight: 187 7 lb  BSA: 2 05 m squared    Allergies: NO KNOWN ALLERGIES    Diagnostic Cardiologist:  Ingris Garcia MD  Primary Physician:  DO JIMMIE Lees    CORONARY CIRCULATION:  Left main: Normal   LAD: Normal   Circumflex: Normal   RCA: Normal     CARDIAC STRUCTURES:  Global left ventricular function was normal  EF calculated by contrast ventriculography was 60 %  INDICATIONS:  --  Cardiac: mitral valve disease  PROCEDURES PERFORMED    --  Left heart catheterization with ventriculography  --  Left coronary angiography  --  Right coronary angiography  --  Outpatient  --  Mod Sedation Same Physician Initial 15min  --  Mod Sedation Same Physician Add 15min  --  Coronary Catheterization (w/ LHC)  PROCEDURE: The risks and alternatives of the procedures and conscious sedation were explained to the patient and informed consent was obtained  The patient was brought to the cath lab and placed on the table  The planned puncture sites  were prepped and draped in the usual sterile fashion     --  Left heart catheterization with ventriculography  A catheter was advanced over a guidewire into the ascending aorta  After recording ascending aortic pressure, the catheter was advanced across the aortic valve and left ventricular  pressure was recorded  Ventriculography was performed  The catheter was pulled back across the aortic valve and into the ascending aorta and pullback pressures were obtained  --  Left coronary artery angiography  A catheter was advanced over a guidewire into the aorta and positioned in the left coronary artery ostium under fluoroscopic guidance  Angiography was performed  --  Right coronary artery angiography  A catheter was advanced over a guidewire into the aorta and positioned in the right coronary artery ostium under fluoroscopic guidance  Angiography was performed  --  Outpatient  --  Mod Sedation Same Physician Initial 15min  --  Mod Sedation Same Physician Add 15min  --  Coronary Catheterization (w/ LHC)  PROCEDURE COMPLETION: The patient tolerated the procedure well and was discharged from the cath lab  TIMING: Test started at 15:55  Test concluded at 16:24  HEMOSTASIS: The sheath was removed  The site was compressed with a Hemoband  device  Hemostasis was obtained  MEDICATIONS GIVEN: Verapamil (Isoptin, Calan, Covera), 2 5 mg, IA, at 16:03  Versed (2mg/2ml), 2 mg, IV, at 15:55  Fentanyl (1OOmcg/2 ml), 50 mcg, IV, at 15:55  1% Lidocaine, 1 ml, subcutaneously, at 16:03  Nitroglycerin (200mcg/ml), 200 mcg, at 16:03  Heparin 1000 units/ml, 4,000 units, IV, at 16:03  Versed (2mg/2ml), 1 mg, IV, at 16:04  Fentanyl (1OOmcg/2 ml), 50 mcg, IV, at 16:04  Versed (2mg/2ml), 1 mg, IV, at 16:12  Fentanyl (1OOmcg/2  ml), 50 mcg, IV, at 16:13  CONTRAST GIVEN: 44 ml Omnipaque (350mg I/ml)  50 ml Omnipaque (350mg I /ml)  RADIATION EXPOSURE: Fluoroscopy time: 7 88 min  HEMODYNAMICS: Hemodynamic assessment demonstrated normal LVEDP  VENTRICLES:   --  Global left ventricular function was normal  EF calculated by contrast ventriculography was 60 %  CORONARY VESSELS:   --  The coronary circulation is right dominant  --  Left main: Normal   --  LAD: Normal   --  Circumflex: Normal   --  RCA: Normal     IMPRESSIONS:  There is no significant coronary artery disease  Left ventricular function is normal     Prepared and signed by    Danielle Caputo MD  Signed 2017 16:29:09    Study diagram    Hemodynamic tables    Pressures:  Baseline  Pressures:  - HR: 86  Pressures:  - Rhythm:  Pressures:  -- Aortic Pressure (S/D/M): 114/69/85  Pressures:  -- Left Ventricle (s/edp): 118/21/--  Pressures:  -- Left Ventricle (s/edp): 118/18/--    Outputs:  Baseline  Outputs:  -- CALCULATIONS[age]  Outputs:  -- CALCULATIONS: Body Surface Area: 2 05  Outputs:  -- CALCULATIONS: Height in cm: 180 00  Outputs:  -- CALCULATIONS: Sex: Male  Outputs:  -- CALCULATIONS: Weight in k 30       No results found for this or any previous visit      Alison Botello MD    Portions of the record may have been created with voice recognition software   Occasional wrong word or "sound a like" substitutions may have occurred due to the inherent limitations of voice recognition software   Read the chart carefully and recognize, using context, where substitutions have occurred

## 2019-01-07 NOTE — PROGRESS NOTES
MDT LOOP  CARELINK TRANSMISSION: BATTERY STATUS "OK"  UNDERLYING SINUS RHYTHM @ 70 BPM  NO EPISODES  AF 0%  NORMAL DEVICE FUNCTION   PL

## 2019-04-11 DIAGNOSIS — I48.0 PAF (PAROXYSMAL ATRIAL FIBRILLATION) (HCC): ICD-10-CM

## 2019-04-29 DIAGNOSIS — I48.0 PAF (PAROXYSMAL ATRIAL FIBRILLATION) (HCC): ICD-10-CM

## 2019-05-15 ENCOUNTER — APPOINTMENT (OUTPATIENT)
Dept: LAB | Facility: CLINIC | Age: 59
End: 2019-05-15
Payer: COMMERCIAL

## 2019-05-15 ENCOUNTER — OFFICE VISIT (OUTPATIENT)
Dept: FAMILY MEDICINE CLINIC | Facility: CLINIC | Age: 59
End: 2019-05-15
Payer: COMMERCIAL

## 2019-05-15 VITALS
RESPIRATION RATE: 16 BRPM | HEIGHT: 72 IN | HEART RATE: 80 BPM | SYSTOLIC BLOOD PRESSURE: 142 MMHG | BODY MASS INDEX: 28.17 KG/M2 | TEMPERATURE: 97.7 F | WEIGHT: 208 LBS | DIASTOLIC BLOOD PRESSURE: 86 MMHG

## 2019-05-15 DIAGNOSIS — I10 BENIGN ESSENTIAL HYPERTENSION: ICD-10-CM

## 2019-05-15 DIAGNOSIS — I48.0 PAF (PAROXYSMAL ATRIAL FIBRILLATION) (HCC): ICD-10-CM

## 2019-05-15 DIAGNOSIS — I63.132 CEREBROVASCULAR ACCIDENT (CVA) DUE TO EMBOLISM OF LEFT CAROTID ARTERY (HCC): ICD-10-CM

## 2019-05-15 DIAGNOSIS — R73.01 IMPAIRED FASTING GLUCOSE: ICD-10-CM

## 2019-05-15 DIAGNOSIS — G47.33 OBSTRUCTIVE SLEEP APNEA: ICD-10-CM

## 2019-05-15 DIAGNOSIS — Z12.5 SCREENING FOR PROSTATE CANCER: ICD-10-CM

## 2019-05-15 DIAGNOSIS — D86.9 SARCOIDOSIS: ICD-10-CM

## 2019-05-15 DIAGNOSIS — Z00.00 WELL ADULT EXAM: Primary | ICD-10-CM

## 2019-05-15 DIAGNOSIS — Z23 NEED FOR VACCINATION AGAINST STREPTOCOCCUS PNEUMONIAE: ICD-10-CM

## 2019-05-15 PROBLEM — R29.898 WEAKNESS OF HAND: Status: RESOLVED | Noted: 2017-02-19 | Resolved: 2019-05-15

## 2019-05-15 PROBLEM — I63.81 LEFT SIDED LACUNAR STROKE (HCC): Status: ACTIVE | Noted: 2017-03-07

## 2019-05-15 PROBLEM — G45.9 TIA (TRANSIENT ISCHEMIC ATTACK): Status: RESOLVED | Noted: 2017-02-19 | Resolved: 2019-05-15

## 2019-05-15 PROBLEM — IMO0001 ELEVATED BP: Status: RESOLVED | Noted: 2017-02-19 | Resolved: 2019-05-15

## 2019-05-15 LAB
ALBUMIN SERPL BCP-MCNC: 3.6 G/DL (ref 3.5–5)
ALP SERPL-CCNC: 59 U/L (ref 46–116)
ALT SERPL W P-5'-P-CCNC: 26 U/L (ref 12–78)
ANION GAP SERPL CALCULATED.3IONS-SCNC: 7 MMOL/L (ref 4–13)
AST SERPL W P-5'-P-CCNC: 18 U/L (ref 5–45)
BASOPHILS # BLD AUTO: 0.05 THOUSANDS/ΜL (ref 0–0.1)
BASOPHILS NFR BLD AUTO: 1 % (ref 0–1)
BILIRUB SERPL-MCNC: 0.7 MG/DL (ref 0.2–1)
BUN SERPL-MCNC: 17 MG/DL (ref 5–25)
CALCIUM SERPL-MCNC: 9.1 MG/DL (ref 8.3–10.1)
CHLORIDE SERPL-SCNC: 105 MMOL/L (ref 100–108)
CHOLEST SERPL-MCNC: 166 MG/DL (ref 50–200)
CO2 SERPL-SCNC: 28 MMOL/L (ref 21–32)
CREAT SERPL-MCNC: 1.19 MG/DL (ref 0.6–1.3)
EOSINOPHIL # BLD AUTO: 0.24 THOUSAND/ΜL (ref 0–0.61)
EOSINOPHIL NFR BLD AUTO: 5 % (ref 0–6)
ERYTHROCYTE [DISTWIDTH] IN BLOOD BY AUTOMATED COUNT: 12.5 % (ref 11.6–15.1)
EST. AVERAGE GLUCOSE BLD GHB EST-MCNC: 105 MG/DL
GFR SERPL CREATININE-BSD FRML MDRD: 67 ML/MIN/1.73SQ M
GLUCOSE P FAST SERPL-MCNC: 97 MG/DL (ref 65–99)
HBA1C MFR BLD: 5.3 % (ref 4.2–6.3)
HCT VFR BLD AUTO: 45.6 % (ref 36.5–49.3)
HDLC SERPL-MCNC: 39 MG/DL (ref 40–60)
HGB BLD-MCNC: 16 G/DL (ref 12–17)
IMM GRANULOCYTES # BLD AUTO: 0.01 THOUSAND/UL (ref 0–0.2)
IMM GRANULOCYTES NFR BLD AUTO: 0 % (ref 0–2)
LDLC SERPL CALC-MCNC: 106 MG/DL (ref 0–100)
LYMPHOCYTES # BLD AUTO: 1.35 THOUSANDS/ΜL (ref 0.6–4.47)
LYMPHOCYTES NFR BLD AUTO: 25 % (ref 14–44)
MCH RBC QN AUTO: 30.3 PG (ref 26.8–34.3)
MCHC RBC AUTO-ENTMCNC: 35.1 G/DL (ref 31.4–37.4)
MCV RBC AUTO: 86 FL (ref 82–98)
MONOCYTES # BLD AUTO: 0.69 THOUSAND/ΜL (ref 0.17–1.22)
MONOCYTES NFR BLD AUTO: 13 % (ref 4–12)
NEUTROPHILS # BLD AUTO: 3.03 THOUSANDS/ΜL (ref 1.85–7.62)
NEUTS SEG NFR BLD AUTO: 56 % (ref 43–75)
NONHDLC SERPL-MCNC: 127 MG/DL
NRBC BLD AUTO-RTO: 0 /100 WBCS
PLATELET # BLD AUTO: 187 THOUSANDS/UL (ref 149–390)
PMV BLD AUTO: 9.9 FL (ref 8.9–12.7)
POTASSIUM SERPL-SCNC: 4.3 MMOL/L (ref 3.5–5.3)
PROT SERPL-MCNC: 6.5 G/DL (ref 6.4–8.2)
PSA SERPL-MCNC: 0.7 NG/ML (ref 0–4)
RBC # BLD AUTO: 5.28 MILLION/UL (ref 3.88–5.62)
SODIUM SERPL-SCNC: 140 MMOL/L (ref 136–145)
TRIGL SERPL-MCNC: 105 MG/DL
TSH SERPL DL<=0.05 MIU/L-ACNC: 2.43 UIU/ML (ref 0.36–3.74)
WBC # BLD AUTO: 5.37 THOUSAND/UL (ref 4.31–10.16)

## 2019-05-15 PROCEDURE — 80061 LIPID PANEL: CPT | Performed by: FAMILY MEDICINE

## 2019-05-15 PROCEDURE — 80053 COMPREHEN METABOLIC PANEL: CPT | Performed by: FAMILY MEDICINE

## 2019-05-15 PROCEDURE — 84443 ASSAY THYROID STIM HORMONE: CPT | Performed by: FAMILY MEDICINE

## 2019-05-15 PROCEDURE — 36415 COLL VENOUS BLD VENIPUNCTURE: CPT | Performed by: FAMILY MEDICINE

## 2019-05-15 PROCEDURE — 85025 COMPLETE CBC W/AUTO DIFF WBC: CPT | Performed by: FAMILY MEDICINE

## 2019-05-15 PROCEDURE — 90670 PCV13 VACCINE IM: CPT

## 2019-05-15 PROCEDURE — 90471 IMMUNIZATION ADMIN: CPT

## 2019-05-15 PROCEDURE — 83036 HEMOGLOBIN GLYCOSYLATED A1C: CPT | Performed by: FAMILY MEDICINE

## 2019-05-15 PROCEDURE — 99396 PREV VISIT EST AGE 40-64: CPT | Performed by: FAMILY MEDICINE

## 2019-05-15 PROCEDURE — G0103 PSA SCREENING: HCPCS

## 2019-05-29 ENCOUNTER — REMOTE DEVICE CLINIC VISIT (OUTPATIENT)
Dept: CARDIOLOGY CLINIC | Facility: CLINIC | Age: 59
End: 2019-05-29
Payer: COMMERCIAL

## 2019-05-29 DIAGNOSIS — Z45.09 ENCOUNTER FOR LOOP RECORDER CHECK: Primary | ICD-10-CM

## 2019-05-29 DIAGNOSIS — I63.9 CRYPTOGENIC STROKE (HCC): ICD-10-CM

## 2019-05-29 PROCEDURE — 93298 REM INTERROG DEV EVAL SCRMS: CPT | Performed by: INTERNAL MEDICINE

## 2019-05-29 PROCEDURE — 93296 REM INTERROG EVL PM/IDS: CPT | Performed by: INTERNAL MEDICINE

## 2019-06-11 ENCOUNTER — OFFICE VISIT (OUTPATIENT)
Dept: CARDIOLOGY CLINIC | Facility: CLINIC | Age: 59
End: 2019-06-11
Payer: COMMERCIAL

## 2019-06-11 VITALS
OXYGEN SATURATION: 94 % | HEART RATE: 79 BPM | BODY MASS INDEX: 27.36 KG/M2 | WEIGHT: 202 LBS | HEIGHT: 72 IN | DIASTOLIC BLOOD PRESSURE: 80 MMHG | SYSTOLIC BLOOD PRESSURE: 138 MMHG

## 2019-06-11 DIAGNOSIS — I10 BENIGN ESSENTIAL HYPERTENSION: ICD-10-CM

## 2019-06-11 DIAGNOSIS — I34.1 MITRAL VALVE PROLAPSE: ICD-10-CM

## 2019-06-11 DIAGNOSIS — I63.132 CEREBROVASCULAR ACCIDENT (CVA) DUE TO EMBOLISM OF LEFT CAROTID ARTERY (HCC): ICD-10-CM

## 2019-06-11 DIAGNOSIS — I77.810 AORTIC ROOT DILATATION (HCC): ICD-10-CM

## 2019-06-11 DIAGNOSIS — N52.2 DRUG-INDUCED ERECTILE DYSFUNCTION: ICD-10-CM

## 2019-06-11 DIAGNOSIS — I10 ESSENTIAL HYPERTENSION: ICD-10-CM

## 2019-06-11 DIAGNOSIS — I48.0 PAF (PAROXYSMAL ATRIAL FIBRILLATION) (HCC): Primary | ICD-10-CM

## 2019-06-11 DIAGNOSIS — I34.0 MODERATE MITRAL REGURGITATION: ICD-10-CM

## 2019-06-11 DIAGNOSIS — G47.33 OBSTRUCTIVE SLEEP APNEA: ICD-10-CM

## 2019-06-11 DIAGNOSIS — I48.91 ATRIAL FIBRILLATION, UNSPECIFIED TYPE (HCC): ICD-10-CM

## 2019-06-11 PROCEDURE — 93000 ELECTROCARDIOGRAM COMPLETE: CPT | Performed by: INTERNAL MEDICINE

## 2019-06-11 PROCEDURE — 99215 OFFICE O/P EST HI 40 MIN: CPT | Performed by: INTERNAL MEDICINE

## 2019-06-11 RX ORDER — DOFETILIDE 0.5 MG/1
500 CAPSULE ORAL EVERY 12 HOURS SCHEDULED
Qty: 60 CAPSULE | Refills: 11 | Status: SHIPPED | OUTPATIENT
Start: 2019-06-11 | End: 2019-10-15 | Stop reason: SDUPTHER

## 2019-06-11 RX ORDER — SILDENAFIL CITRATE 20 MG/1
TABLET ORAL
Qty: 30 TABLET | Refills: 1 | Status: SHIPPED | OUTPATIENT
Start: 2019-06-11

## 2019-06-11 RX ORDER — LISINOPRIL AND HYDROCHLOROTHIAZIDE 12.5; 1 MG/1; MG/1
1 TABLET ORAL DAILY
Qty: 30 TABLET | Refills: 11 | Status: SHIPPED | OUTPATIENT
Start: 2019-06-11 | End: 2019-10-15

## 2019-06-11 RX ORDER — METOPROLOL SUCCINATE 25 MG/1
12.5 TABLET, EXTENDED RELEASE ORAL DAILY
Qty: 30 TABLET | Refills: 11 | Status: SHIPPED | OUTPATIENT
Start: 2019-06-11 | End: 2020-05-07 | Stop reason: SDUPTHER

## 2019-06-25 ENCOUNTER — TRANSCRIBE ORDERS (OUTPATIENT)
Dept: LAB | Facility: CLINIC | Age: 59
End: 2019-06-25

## 2019-06-25 ENCOUNTER — APPOINTMENT (OUTPATIENT)
Dept: LAB | Facility: CLINIC | Age: 59
End: 2019-06-25
Payer: COMMERCIAL

## 2019-06-25 DIAGNOSIS — I48.91 ATRIAL FIBRILLATION, UNSPECIFIED TYPE (HCC): ICD-10-CM

## 2019-06-25 LAB
ANION GAP SERPL CALCULATED.3IONS-SCNC: 6 MMOL/L (ref 4–13)
BUN SERPL-MCNC: 21 MG/DL (ref 5–25)
CALCIUM SERPL-MCNC: 8.5 MG/DL (ref 8.3–10.1)
CHLORIDE SERPL-SCNC: 103 MMOL/L (ref 100–108)
CO2 SERPL-SCNC: 30 MMOL/L (ref 21–32)
CREAT SERPL-MCNC: 1.43 MG/DL (ref 0.6–1.3)
GFR SERPL CREATININE-BSD FRML MDRD: 54 ML/MIN/1.73SQ M
GLUCOSE P FAST SERPL-MCNC: 99 MG/DL (ref 65–99)
POTASSIUM SERPL-SCNC: 3.9 MMOL/L (ref 3.5–5.3)
SODIUM SERPL-SCNC: 139 MMOL/L (ref 136–145)

## 2019-06-25 PROCEDURE — 80048 BASIC METABOLIC PNL TOTAL CA: CPT

## 2019-06-25 PROCEDURE — 36415 COLL VENOUS BLD VENIPUNCTURE: CPT

## 2019-07-03 DIAGNOSIS — N18.9 CKD (CHRONIC KIDNEY DISEASE): Primary | ICD-10-CM

## 2019-07-03 NOTE — PROGRESS NOTES
Creatinine 1 43, up from 1 19, started lisinopril/HCTZ for uncontrolled hypertension  This was a less than 30% rising creatinine, not unexpected with initiation of ACE-inhibitor therapy  Encouraged increased fluid intake, repeat labs in 2-4 weeks

## 2019-07-05 ENCOUNTER — TELEPHONE (OUTPATIENT)
Dept: CARDIOLOGY CLINIC | Facility: CLINIC | Age: 59
End: 2019-07-05

## 2019-07-05 NOTE — TELEPHONE ENCOUNTER
Called and spoke with Leonard Ramos regarding his recent BMP  Instructed about follow up blood work as per Dr Tisha Haque      ----- Message from Jozef Bartlett MD sent at 7/3/2019  5:34 PM EDT -----  Please let the patient know that there has been a slight change in his kidney function, 1 19-1 43, not completely unexpected considering the medications restarted, I would continue it for now, he needs another basic metabolic panel in a month, and should be very judicious with his fluid intake

## 2019-08-01 ENCOUNTER — APPOINTMENT (OUTPATIENT)
Dept: LAB | Facility: CLINIC | Age: 59
End: 2019-08-01
Payer: COMMERCIAL

## 2019-08-01 DIAGNOSIS — N18.9 CKD (CHRONIC KIDNEY DISEASE): ICD-10-CM

## 2019-08-01 LAB
ANION GAP SERPL CALCULATED.3IONS-SCNC: 10 MMOL/L (ref 4–13)
BUN SERPL-MCNC: 16 MG/DL (ref 5–25)
CALCIUM SERPL-MCNC: 8.4 MG/DL (ref 8.3–10.1)
CHLORIDE SERPL-SCNC: 105 MMOL/L (ref 100–108)
CO2 SERPL-SCNC: 28 MMOL/L (ref 21–32)
CREAT SERPL-MCNC: 1.34 MG/DL (ref 0.6–1.3)
GFR SERPL CREATININE-BSD FRML MDRD: 58 ML/MIN/1.73SQ M
GLUCOSE P FAST SERPL-MCNC: 92 MG/DL (ref 65–99)
POTASSIUM SERPL-SCNC: 4.5 MMOL/L (ref 3.5–5.3)
SODIUM SERPL-SCNC: 143 MMOL/L (ref 136–145)

## 2019-08-01 PROCEDURE — 36415 COLL VENOUS BLD VENIPUNCTURE: CPT

## 2019-08-01 PROCEDURE — 80048 BASIC METABOLIC PNL TOTAL CA: CPT

## 2019-08-12 ENCOUNTER — REMOTE DEVICE CLINIC VISIT (OUTPATIENT)
Dept: CARDIOLOGY CLINIC | Facility: CLINIC | Age: 59
End: 2019-08-12
Payer: COMMERCIAL

## 2019-08-12 DIAGNOSIS — I48.0 PAF (PAROXYSMAL ATRIAL FIBRILLATION) (HCC): ICD-10-CM

## 2019-08-12 DIAGNOSIS — I63.9 CRYPTOGENIC STROKE (HCC): Primary | ICD-10-CM

## 2019-08-12 DIAGNOSIS — Z95.818 PRESENCE OF OTHER CARDIAC IMPLANTS AND GRAFTS: ICD-10-CM

## 2019-08-12 PROCEDURE — 93298 REM INTERROG DEV EVAL SCRMS: CPT | Performed by: INTERNAL MEDICINE

## 2019-08-12 PROCEDURE — 93299 PR REM INTERROG ICPMS/SCRMS <30 D TECH REVIEW: CPT | Performed by: INTERNAL MEDICINE

## 2019-08-12 NOTE — PROGRESS NOTES
MDT LOOP  CARELINK TRANSMISSION (LOOP); BATTERY STATUS "OK"  PRESENTING RHYTHM STACH @ ~ 102 BPM  NO PATIENT ACTIVATED EPISODES  2 TACHY & 2 AF EPISODES, ALL SAME DAY (6/4) AND PREVIOUSLY ADDRESSED IN ALERT  ECG'S SHOW PAF  BURDEN - 0 1%  NO OTHER SIGNIFICANT DEVICE DETECTED HIGH RATE EPISODES  HX: PAF & PT TAKES ELIQUIS, TIKOSYN, METOPROLOL SUCC   NORMAL DEVICE FUNCTION   EB

## 2019-10-15 ENCOUNTER — OFFICE VISIT (OUTPATIENT)
Dept: CARDIOLOGY CLINIC | Facility: CLINIC | Age: 59
End: 2019-10-15
Payer: COMMERCIAL

## 2019-10-15 VITALS
DIASTOLIC BLOOD PRESSURE: 70 MMHG | SYSTOLIC BLOOD PRESSURE: 122 MMHG | WEIGHT: 198 LBS | BODY MASS INDEX: 26.82 KG/M2 | HEIGHT: 72 IN | OXYGEN SATURATION: 97 % | HEART RATE: 77 BPM

## 2019-10-15 DIAGNOSIS — I34.1 MITRAL VALVE PROLAPSE: ICD-10-CM

## 2019-10-15 DIAGNOSIS — I48.0 PAF (PAROXYSMAL ATRIAL FIBRILLATION) (HCC): Primary | ICD-10-CM

## 2019-10-15 DIAGNOSIS — I48.91 ATRIAL FIBRILLATION, UNSPECIFIED TYPE (HCC): ICD-10-CM

## 2019-10-15 DIAGNOSIS — I63.132 CEREBROVASCULAR ACCIDENT (CVA) DUE TO EMBOLISM OF LEFT CAROTID ARTERY (HCC): ICD-10-CM

## 2019-10-15 DIAGNOSIS — I34.0 MODERATE MITRAL REGURGITATION: ICD-10-CM

## 2019-10-15 DIAGNOSIS — I77.810 AORTIC ROOT DILATATION (HCC): ICD-10-CM

## 2019-10-15 DIAGNOSIS — I10 BENIGN ESSENTIAL HYPERTENSION: ICD-10-CM

## 2019-10-15 PROCEDURE — 3078F DIAST BP <80 MM HG: CPT | Performed by: INTERNAL MEDICINE

## 2019-10-15 PROCEDURE — 93000 ELECTROCARDIOGRAM COMPLETE: CPT | Performed by: INTERNAL MEDICINE

## 2019-10-15 PROCEDURE — 3074F SYST BP LT 130 MM HG: CPT | Performed by: INTERNAL MEDICINE

## 2019-10-15 PROCEDURE — 99215 OFFICE O/P EST HI 40 MIN: CPT | Performed by: INTERNAL MEDICINE

## 2019-10-15 RX ORDER — LISINOPRIL 10 MG/1
10 TABLET ORAL DAILY
Qty: 90 TABLET | Refills: 3 | Status: SHIPPED | OUTPATIENT
Start: 2019-10-15 | End: 2020-05-07 | Stop reason: SDUPTHER

## 2019-10-15 RX ORDER — DOFETILIDE 0.5 MG/1
500 CAPSULE ORAL EVERY 12 HOURS SCHEDULED
Qty: 60 CAPSULE | Refills: 11 | Status: SHIPPED | OUTPATIENT
Start: 2019-10-15 | End: 2020-10-30 | Stop reason: SDUPTHER

## 2019-10-15 NOTE — PROGRESS NOTES
Follow-up - Cardiology   Sukumar Ellsworth 63 y o  male MRN: [id number]        Problems    1  PAF (paroxysmal atrial fibrillation) (HCC)  POCT ECG   2  Aortic root dilatation (HCC)  POCT ECG   3  Benign essential hypertension  POCT ECG   4  Atrial fibrillation, unspecified type (HCC)  dofetilide (TIKOSYN) 500 mcg capsule   5  Mitral valve prolapse     6  Moderate mitral regurgitation     7  Cerebrovascular accident (CVA) due to embolism of left carotid artery [facility])         Impression:    Letty Mccarty follows up with me at the [place] office  Atrial fibrillation  · Paroxysmal and symptomatic  · Secondary stroke prophylaxis with Eliquis  · Rhythm control with Tikosyn and metoprolol  · Recent AFib burden by loop recorder 0 1%  · Minimal symptoms currently    Aortic root enlargement  · 42 mm by echo 2018  · No repeat studies at this point    Hypertension  · Now well controlled with the addition of lisinopril/HCTZ and dietary modification  · However, orthostatic lightheadedness    Mitral regurgitation  · prolapse of the mitral valve A2/P2 leaflets, originally moderate, but repeat testing 2018 showed mild regurgitation  · No pre dental antibiotics necessary    Plan:    · Repeat echo prior to next visit  · Continue low-sodium diet  · Discontinue lisinopril HCTZ, HCTZ is not to be used in combination with Tikosyn  · Lisinopril 10 mg daily in its place  · 6 month f/u  · Stop drinking whole milk, try to cut down on hot dogs    HPI:   Sukumar Ellsworth is a 63y o  year old male with a history of embolic CVA who, paroxysmal atrial fibrillation, mitral valve prolapse, mitral valve regurgitation, hypertension, history of sarcoidosis, AMY, and 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 presents for a f/u    Atrial fibrillation-on rhythm control with Tikosyn and metoprolol, takes Eliquis for secondary stroke prevention    Device check/loop recorder, 8/9/2019-atrial fibrillation burden 0 1%    Hypertension was not well controlled previously, self-described severely sodium rich diet  Added lisinopril/HCTZ, discussed dietary modification, blood pressure is now under excellent control    Cholesterol remains really well controlled    Mildly dilated aortic root at 42 mm as of 2018, has not needed any recent follow-up    Mitral valve disease-he denies any shortness of breath or exertional dyspnea  Obstructive sleep apnea-he remains compliant    Review of Systems   Constitutional: Negative for appetite change, diaphoresis, fatigue and fever  Respiratory: Negative for chest tightness, shortness of breath and wheezing  Cardiovascular: Negative for chest pain, palpitations and leg swelling  Gastrointestinal: Negative for abdominal pain and blood in stool  Musculoskeletal: Negative for arthralgias and joint swelling  Skin: Negative for rash  Neurological: Positive for light-headedness  Negative for dizziness and syncope           Past Medical History:   Diagnosis Date    Allergic rhinitis     Aortic root dilatation (HCC)     Asthma     Benign essential hypertension     Cervical lymphadenopathy     CKD (chronic kidney disease)     [facility]A [facility]cere[facility] vascular accident) [facility]     Elevated blood pressure reading     last assessed 01/08/14    Erectile dysfunction of non-organic origin     Fluttering heart     Hypercalcemia     last assessed 05/19/16    Hypertension     [facility]     Mitral valve prolapse     Moderate mitral regurgitation     [facility]     Palpitations     last assessed 03/29/13    Renal insufficiency     last assessed 09/23/13    Sarcoidosis     Sarcoidosis     TIA (transient ischemic attack)      Social History     Substance and Sexual Activity   Alcohol Use Yes    Comment: rare     Social History     Substance and Sexual Activity   Drug Use No     Social History     Tobacco Use   Smoking Status Never Smoker   Smokeless Tobacco Never Used       Allergies:  No Known Allergies    Medications: Current Outpatient Medications:     apixaban (ELIQUIS) 5 mg, Take 1 tablet (5 mg total) by mouth 2 (two) times a day, Disp: 180 tablet, Rfl: 3    Ascorbic Acid (VITAMIN C) 1000 MG tablet, Take 1,000 Doses by mouth, Disp: , Rfl:     BIOTIN PO, Take 1 capsule by mouth daily, Disp: , Rfl:     dofetilide (TIKOSYN) 500 mcg capsule, Take 1 capsule (500 mcg total) by mouth every 12 (twelve) hours, Disp: 60 capsule, Rfl: 11    Ferrous Sulfate (IRON) 325 (65 Fe) MG TABS, Take 1 Dose by mouth 3 (three) times a week QOD, Disp: , Rfl:     fluticasone (FLONASE) 50 mcg/act nasal spray, 50 sprays into each nostril daily, Disp: , Rfl:     Glucosamine HCl (GLUCOSAMINE PO), Take 1 capsule by mouth, Disp: , Rfl:     lisinopril-hydrochlorothiazide (PRINZIDE,ZESTORETIC) 10-12 5 MG per tablet, Take 1 tablet by mouth daily, Disp: 30 tablet, Rfl: 11    metoprolol succinate (TOPROL-XL) 25 mg 24 hr tablet, Take 0 5 tablets (12 5 mg total) by mouth daily, Disp: 30 tablet, Rfl: 11    sildenafil (REVATIO) 20 mg tablet, Take 3-5 tabelts 1 hour prior to intercourse , Disp: 30 tablet, Rfl: 1    VITAMIN B COMPLEX-C PO, Take 1 Dose by mouth, Disp: , Rfl:     vitamin E, tocopherol, 400 units capsule, Take 1 Dose by mouth daily, Disp: , Rfl:       Vitals:    10/15/19 0824   BP: 122/70   Pulse: 77   SpO2: 97%     Weight (last 2 days)     Date/Time   Weight    10/15/19 0824   89 8 (198)            Physical Exam   Constitutional: He is oriented to person, place, and time  No distress  HENT:   Head: Normocephalic and atraumatic  Eyes: Conjunctivae are normal  No scleral icterus  Neck: Normal range of motion  No JVD present  Cardiovascular: Normal rate, regular rhythm, normal heart sounds and intact distal pulses  No murmur heard  Pulmonary/Chest: Effort normal and breath sounds normal  No respiratory distress  He has no decreased breath sounds  He has no wheezes  He has no rhonchi  He has no rales     Musculoskeletal: He exhibits no edema  Right lower leg: Normal  He exhibits no edema  Left lower leg: Normal  He exhibits no edema  Neurological: He is alert and oriented to person, place, and time  Skin: Skin is warm and dry  He is not diaphoretic  Laboratory Studies:  Lab Results   Component Value Date    HGBA1C 5 3 05/15/2019    HGBA1C 5 3 06/30/2017    HGBA1C 5 2 02/20/2017     11/11/2015     06/27/2015     11/10/2014    K 4 5 08/01/2019    K 3 9 06/25/2019    K 4 3 05/15/2019    K 4 0 11/11/2015    K 4 1 06/27/2015    K 4 0 11/10/2014     08/01/2019     06/25/2019     05/15/2019     11/11/2015     06/27/2015     11/10/2014    CO2 28 08/01/2019    CO2 30 06/25/2019    CO2 28 05/15/2019    CO2 28 11/11/2015    CO2 28 06/27/2015    CO2 28 11/10/2014    GLUCOSE 81 11/11/2015    GLUCOSE 93 06/27/2015    GLUCOSE 93 11/10/2014    CREATININE 1 34 (H) 08/01/2019    CREATININE 1 43 (H) 06/25/2019    CREATININE 1 19 05/15/2019    CREATININE 1 26 11/11/2015    CREATININE 1 17 06/27/2015    CREATININE 1 13 11/10/2014    BUN 16 08/01/2019    BUN 21 06/25/2019    BUN 17 05/15/2019    BUN 19 11/11/2015    BUN 19 06/27/2015    BUN 15 11/10/2014    MG 1 9 09/08/2017    MG 2 2 07/06/2017    MG 2 1 07/05/2017     Lab Results   Component Value Date    WBC 5 37 05/15/2019    WBC 6 11 11/11/2015    RBC 5 28 05/15/2019    RBC 4 88 11/11/2015    HGB 16 0 05/15/2019    HGB 14 0 11/11/2015    HCT 45 6 05/15/2019    HCT 41 7 11/11/2015    MCV 86 05/15/2019    MCV 86 11/11/2015    MCH 30 3 05/15/2019    MCH 28 7 11/11/2015    RDW 12 5 05/15/2019    RDW 14 4 11/11/2015     05/15/2019     11/11/2015     NT-proBNP: No results for input(s): NTBNP in the last 72 hours     Coags:    Lipid Profile:   Lab Results   Component Value Date    CHOL 153 06/27/2015     Lab Results   Component Value Date    HDL 39 (L) 05/15/2019     Lab Results   Component Value Date    LDLCALC 106 (H) 05/15/2019     Lab Results   Component Value Date    TRIG 105 05/15/2019       Cardiac testing:   EKG reviewed personally:  Normal sinus rhythm,  milliseconds    Loop recorder interrogation 6/4/2019 - 2 8% AFib burden, 2 episodes of AFib with RVR  Loop recorder interrogation 8/9/2019-0 1% AFib burden    Echocardiogram 2/18-LVEF 60%, grade 1 DD, trace MR with bowing of the anterior and posterior leaflets, very mild aortic root enlargement at 42 mm    Cardiac catheterization 5/17 - normal coronaries    Renny Luo MD    Portions of the record may have been created with voice recognition software   Occasional wrong word or "sound a like" substitutions may have occurred due to the inherent limitations of voice recognition software   Read the chart carefully and recognize, using context, where substitutions have occurred

## 2019-10-15 NOTE — PATIENT INSTRUCTIONS
· Repeat echo prior to next visit  · Continue low-sodium diet  · Stop taking lisinopril/HCTZ, and switch to lisinopril 10 mg daily  · 6 month f/u  · Stop drinking whole milk  · Cut down on sodium rich foods

## 2019-10-15 NOTE — LETTER
October 15, 2019     Patient: Jaz Dickson   YOB: 1960   Date of Visit: 10/15/2019       To Whom it May Concern:    Jaz Dickson is under my professional care  He was seen in my office on 10/15/2019  He may return to work on 10/16/19  If you have any questions or concerns, please don't hesitate to call           Sincerely,          Zion Barrientos MD        CC: Jaz Randolph

## 2019-11-11 ENCOUNTER — REMOTE DEVICE CLINIC VISIT (OUTPATIENT)
Dept: CARDIOLOGY CLINIC | Facility: CLINIC | Age: 59
End: 2019-11-11
Payer: COMMERCIAL

## 2019-11-11 DIAGNOSIS — Z95.818 PRESENCE OF OTHER CARDIAC IMPLANTS AND GRAFTS: Primary | ICD-10-CM

## 2019-11-11 PROCEDURE — 93298 REM INTERROG DEV EVAL SCRMS: CPT | Performed by: INTERNAL MEDICINE

## 2019-11-11 PROCEDURE — 93299 PR REM INTERROG ICPMS/SCRMS <30 D TECH REVIEW: CPT | Performed by: INTERNAL MEDICINE

## 2019-11-11 NOTE — PROGRESS NOTES
MDT LOOP  CARELINK TRANSMISSION (LOOP); BATTERY STATUS "OK"  PRESENTING RHYTHM NSR  @ 84 BPM  NO PATIENT OR DEVICE ACTIVATED EPISODES  NORMAL DEVICE FUNCTION      EB

## 2020-01-15 ENCOUNTER — HOSPITAL ENCOUNTER (OUTPATIENT)
Dept: NON INVASIVE DIAGNOSTICS | Facility: CLINIC | Age: 60
Discharge: HOME | End: 2020-01-15
Payer: COMMERCIAL

## 2020-01-15 DIAGNOSIS — I77.810 AORTIC ROOT DILATATION (HCC): ICD-10-CM

## 2020-01-15 DIAGNOSIS — I48.0 PAF (PAROXYSMAL ATRIAL FIBRILLATION) (HCC): ICD-10-CM

## 2020-01-15 PROCEDURE — 93306 TTE W/DOPPLER COMPLETE: CPT

## 2020-01-15 PROCEDURE — 93306 TTE W/DOPPLER COMPLETE: CPT | Performed by: INTERNAL MEDICINE

## 2020-02-21 ENCOUNTER — REMOTE DEVICE CLINIC VISIT (OUTPATIENT)
Dept: CARDIOLOGY CLINIC | Facility: CLINIC | Age: 60
End: 2020-02-21
Payer: COMMERCIAL

## 2020-02-21 DIAGNOSIS — Z95.818 PRESENCE OF OTHER CARDIAC IMPLANTS AND GRAFTS: Primary | ICD-10-CM

## 2020-02-21 PROCEDURE — G2066 INTER DEVC REMOTE 30D: HCPCS | Performed by: INTERNAL MEDICINE

## 2020-02-21 PROCEDURE — 93298 REM INTERROG DEV EVAL SCRMS: CPT | Performed by: INTERNAL MEDICINE

## 2020-02-21 NOTE — PROGRESS NOTES
Results for orders placed or performed in visit on 02/21/20   Cardiac EP device report    Narrative    MDT LOOP  CARELINK TRANSMISSION LOOP: BATTERY STATUS "OK"  PRESENTING RHYTHM: NSR W/ PVC'S @ 98 BPM   1 SYMPTOM W/ EGRAM SHOWING NSR W/ PVC'S @ 88 BPM   STAFF TO CALL PT RE: SYMPTOM  NO OTHER PATIENT OR DEVICE ACTIVATED EPISODES  NORMAL DEVICE FUNCTION    [address] [place] [address]

## 2020-04-24 DIAGNOSIS — I48.0 PAF (PAROXYSMAL ATRIAL FIBRILLATION) (HCC): ICD-10-CM

## 2020-05-07 ENCOUNTER — TELEMEDICINE (OUTPATIENT)
Dept: CARDIOLOGY CLINIC | Facility: CLINIC | Age: 60
End: 2020-05-07
Payer: COMMERCIAL

## 2020-05-07 VITALS
HEIGHT: 72 IN | WEIGHT: 194 LBS | BODY MASS INDEX: 26.28 KG/M2 | DIASTOLIC BLOOD PRESSURE: 76 MMHG | HEART RATE: 72 BPM | SYSTOLIC BLOOD PRESSURE: 117 MMHG

## 2020-05-07 DIAGNOSIS — I10 BENIGN ESSENTIAL HYPERTENSION: ICD-10-CM

## 2020-05-07 DIAGNOSIS — G47.33 OBSTRUCTIVE SLEEP APNEA: ICD-10-CM

## 2020-05-07 DIAGNOSIS — I10 ESSENTIAL HYPERTENSION: ICD-10-CM

## 2020-05-07 DIAGNOSIS — I63.132 CEREBROVASCULAR ACCIDENT (CVA) DUE TO EMBOLISM OF LEFT CAROTID ARTERY (HCC): ICD-10-CM

## 2020-05-07 DIAGNOSIS — I77.810 AORTIC ROOT DILATATION (HCC): ICD-10-CM

## 2020-05-07 DIAGNOSIS — I34.1 MITRAL VALVE PROLAPSE: Primary | ICD-10-CM

## 2020-05-07 DIAGNOSIS — D86.9 SARCOIDOSIS: ICD-10-CM

## 2020-05-07 DIAGNOSIS — I34.0 MODERATE MITRAL REGURGITATION: ICD-10-CM

## 2020-05-07 DIAGNOSIS — I48.0 PAF (PAROXYSMAL ATRIAL FIBRILLATION) (HCC): ICD-10-CM

## 2020-05-07 PROCEDURE — 99214 OFFICE O/P EST MOD 30 MIN: CPT | Performed by: INTERNAL MEDICINE

## 2020-05-07 PROCEDURE — 3074F SYST BP LT 130 MM HG: CPT | Performed by: INTERNAL MEDICINE

## 2020-05-07 PROCEDURE — 3008F BODY MASS INDEX DOCD: CPT | Performed by: INTERNAL MEDICINE

## 2020-05-07 PROCEDURE — 3078F DIAST BP <80 MM HG: CPT | Performed by: INTERNAL MEDICINE

## 2020-05-07 RX ORDER — METOPROLOL SUCCINATE 25 MG/1
12.5 TABLET, EXTENDED RELEASE ORAL DAILY
Qty: 30 TABLET | Refills: 11 | Status: SHIPPED | OUTPATIENT
Start: 2020-05-07 | End: 2021-05-13

## 2020-05-07 RX ORDER — LISINOPRIL 10 MG/1
10 TABLET ORAL DAILY
Qty: 30 TABLET | Refills: 11 | Status: SHIPPED | OUTPATIENT
Start: 2020-05-07 | End: 2021-05-26

## 2020-05-13 ENCOUNTER — APPOINTMENT (OUTPATIENT)
Dept: LAB | Facility: CLINIC | Age: 60
End: 2020-05-13
Payer: COMMERCIAL

## 2020-05-13 DIAGNOSIS — I10 BENIGN ESSENTIAL HYPERTENSION: ICD-10-CM

## 2020-05-13 DIAGNOSIS — I48.0 PAF (PAROXYSMAL ATRIAL FIBRILLATION) (HCC): ICD-10-CM

## 2020-05-13 LAB
ANION GAP SERPL CALCULATED.3IONS-SCNC: 7 MMOL/L (ref 4–13)
BUN SERPL-MCNC: 21 MG/DL (ref 5–25)
CALCIUM SERPL-MCNC: 8.3 MG/DL (ref 8.3–10.1)
CHLORIDE SERPL-SCNC: 105 MMOL/L (ref 100–108)
CHOLEST SERPL-MCNC: 167 MG/DL (ref 50–200)
CO2 SERPL-SCNC: 27 MMOL/L (ref 21–32)
CREAT SERPL-MCNC: 1.21 MG/DL (ref 0.6–1.3)
GFR SERPL CREATININE-BSD FRML MDRD: 66 ML/MIN/1.73SQ M
GLUCOSE P FAST SERPL-MCNC: 89 MG/DL (ref 65–99)
HDLC SERPL-MCNC: 38 MG/DL
LDLC SERPL CALC-MCNC: 111 MG/DL (ref 0–100)
NONHDLC SERPL-MCNC: 129 MG/DL
POTASSIUM SERPL-SCNC: 3.4 MMOL/L (ref 3.5–5.3)
SODIUM SERPL-SCNC: 139 MMOL/L (ref 136–145)
TRIGL SERPL-MCNC: 91 MG/DL

## 2020-05-13 PROCEDURE — 80061 LIPID PANEL: CPT

## 2020-05-13 PROCEDURE — 36415 COLL VENOUS BLD VENIPUNCTURE: CPT

## 2020-05-13 PROCEDURE — 80048 BASIC METABOLIC PNL TOTAL CA: CPT

## 2020-05-22 ENCOUNTER — REMOTE DEVICE CLINIC VISIT (OUTPATIENT)
Dept: CARDIOLOGY CLINIC | Facility: CLINIC | Age: 60
End: 2020-05-22
Payer: COMMERCIAL

## 2020-05-22 DIAGNOSIS — Z95.818 PRESENCE OF OTHER CARDIAC IMPLANTS AND GRAFTS: Primary | ICD-10-CM

## 2020-05-22 PROCEDURE — G2066 INTER DEVC REMOTE 30D: HCPCS | Performed by: INTERNAL MEDICINE

## 2020-05-22 PROCEDURE — 93298 REM INTERROG DEV EVAL SCRMS: CPT | Performed by: INTERNAL MEDICINE

## 2020-08-21 ENCOUNTER — REMOTE DEVICE CLINIC VISIT (OUTPATIENT)
Dept: CARDIOLOGY CLINIC | Facility: CLINIC | Age: 60
End: 2020-08-21
Payer: COMMERCIAL

## 2020-08-21 DIAGNOSIS — Z95.818 PRESENCE OF OTHER CARDIAC IMPLANTS AND GRAFTS: Primary | ICD-10-CM

## 2020-08-21 PROCEDURE — G2066 INTER DEVC REMOTE 30D: HCPCS | Performed by: INTERNAL MEDICINE

## 2020-08-21 PROCEDURE — 93298 REM INTERROG DEV EVAL SCRMS: CPT | Performed by: INTERNAL MEDICINE

## 2020-08-21 NOTE — PROGRESS NOTES
Results for orders placed or performed in visit on 08/21/20   Cardiac EP device report    Narrative    MDT LOOP/ ACTIVE SYSTEM IS MRI CONDITIONAL  CARELINK TRANSMISSION: BATTERY STATUS "OK"  DEVICE DETECTED 1 TACHY EPISODE PREVIOUSLY ADDRESSED  NO PT ACTIVATED EPISODES  PRESENTING RHYTHM NSR W/ PVC  NORMAL DEVICE FUNCTION   GV

## 2020-09-17 ENCOUNTER — APPOINTMENT (OUTPATIENT)
Dept: LAB | Facility: CLINIC | Age: 60
End: 2020-09-17
Payer: COMMERCIAL

## 2020-09-17 ENCOUNTER — PREPPED CHART (OUTPATIENT)
Dept: URBAN - METROPOLITAN AREA CLINIC 6 | Facility: CLINIC | Age: 60
End: 2020-09-17

## 2020-09-17 ENCOUNTER — OFFICE VISIT (OUTPATIENT)
Dept: FAMILY MEDICINE CLINIC | Facility: CLINIC | Age: 60
End: 2020-09-17
Payer: COMMERCIAL

## 2020-09-17 VITALS
HEIGHT: 72 IN | TEMPERATURE: 97.7 F | RESPIRATION RATE: 16 BRPM | DIASTOLIC BLOOD PRESSURE: 72 MMHG | HEART RATE: 72 BPM | SYSTOLIC BLOOD PRESSURE: 112 MMHG | BODY MASS INDEX: 24.24 KG/M2 | WEIGHT: 179 LBS

## 2020-09-17 DIAGNOSIS — Z11.59 NEED FOR HEPATITIS C SCREENING TEST: ICD-10-CM

## 2020-09-17 DIAGNOSIS — R73.01 IMPAIRED FASTING GLUCOSE: ICD-10-CM

## 2020-09-17 DIAGNOSIS — Z00.00 WELL ADULT EXAM: Primary | ICD-10-CM

## 2020-09-17 DIAGNOSIS — I63.81 LEFT SIDED LACUNAR STROKE (HCC): ICD-10-CM

## 2020-09-17 DIAGNOSIS — Z12.5 SCREENING FOR PROSTATE CANCER: ICD-10-CM

## 2020-09-17 DIAGNOSIS — Z23 NEED FOR INFLUENZA VACCINATION: ICD-10-CM

## 2020-09-17 DIAGNOSIS — D86.9 SARCOIDOSIS: ICD-10-CM

## 2020-09-17 DIAGNOSIS — I48.0 PAF (PAROXYSMAL ATRIAL FIBRILLATION) (HCC): ICD-10-CM

## 2020-09-17 DIAGNOSIS — I10 BENIGN ESSENTIAL HYPERTENSION: ICD-10-CM

## 2020-09-17 DIAGNOSIS — I34.0 MODERATE MITRAL REGURGITATION: ICD-10-CM

## 2020-09-17 LAB
ALBUMIN SERPL BCP-MCNC: 4.1 G/DL (ref 3.5–5)
ALP SERPL-CCNC: 50 U/L (ref 46–116)
ALT SERPL W P-5'-P-CCNC: 16 U/L (ref 12–78)
ANION GAP SERPL CALCULATED.3IONS-SCNC: 9 MMOL/L (ref 4–13)
AST SERPL W P-5'-P-CCNC: 16 U/L (ref 5–45)
BASOPHILS # BLD AUTO: 0.04 THOUSANDS/ΜL (ref 0–0.1)
BASOPHILS NFR BLD AUTO: 1 % (ref 0–1)
BILIRUB SERPL-MCNC: 0.75 MG/DL (ref 0.2–1)
BUN SERPL-MCNC: 17 MG/DL (ref 5–25)
CALCIUM SERPL-MCNC: 9 MG/DL (ref 8.3–10.1)
CHLORIDE SERPL-SCNC: 106 MMOL/L (ref 100–108)
CO2 SERPL-SCNC: 26 MMOL/L (ref 21–32)
CREAT SERPL-MCNC: 1.31 MG/DL (ref 0.6–1.3)
EOSINOPHIL # BLD AUTO: 0.11 THOUSAND/ΜL (ref 0–0.61)
EOSINOPHIL NFR BLD AUTO: 2 % (ref 0–6)
ERYTHROCYTE [DISTWIDTH] IN BLOOD BY AUTOMATED COUNT: 12.3 % (ref 11.6–15.1)
GFR SERPL CREATININE-BSD FRML MDRD: 59 ML/MIN/1.73SQ M
GLUCOSE P FAST SERPL-MCNC: 88 MG/DL (ref 65–99)
HCT VFR BLD AUTO: 42 % (ref 36.5–49.3)
HCV AB SER QL: NORMAL
HGB BLD-MCNC: 14.5 G/DL (ref 12–17)
IMM GRANULOCYTES # BLD AUTO: 0.01 THOUSAND/UL (ref 0–0.2)
IMM GRANULOCYTES NFR BLD AUTO: 0 % (ref 0–2)
LYMPHOCYTES # BLD AUTO: 1.16 THOUSANDS/ΜL (ref 0.6–4.47)
LYMPHOCYTES NFR BLD AUTO: 22 % (ref 14–44)
MCH RBC QN AUTO: 30.6 PG (ref 26.8–34.3)
MCHC RBC AUTO-ENTMCNC: 34.5 G/DL (ref 31.4–37.4)
MCV RBC AUTO: 89 FL (ref 82–98)
MONOCYTES # BLD AUTO: 0.45 THOUSAND/ΜL (ref 0.17–1.22)
MONOCYTES NFR BLD AUTO: 9 % (ref 4–12)
NEUTROPHILS # BLD AUTO: 3.45 THOUSANDS/ΜL (ref 1.85–7.62)
NEUTS SEG NFR BLD AUTO: 66 % (ref 43–75)
NRBC BLD AUTO-RTO: 0 /100 WBCS
PLATELET # BLD AUTO: 192 THOUSANDS/UL (ref 149–390)
PMV BLD AUTO: 10.4 FL (ref 8.9–12.7)
POTASSIUM SERPL-SCNC: 4.5 MMOL/L (ref 3.5–5.3)
PROT SERPL-MCNC: 6.6 G/DL (ref 6.4–8.2)
PSA SERPL-MCNC: 0.8 NG/ML (ref 0–4)
RBC # BLD AUTO: 4.74 MILLION/UL (ref 3.88–5.62)
SODIUM SERPL-SCNC: 141 MMOL/L (ref 136–145)
TSH SERPL DL<=0.05 MIU/L-ACNC: 1.61 UIU/ML (ref 0.36–3.74)
WBC # BLD AUTO: 5.22 THOUSAND/UL (ref 4.31–10.16)

## 2020-09-17 PROCEDURE — 3078F DIAST BP <80 MM HG: CPT | Performed by: FAMILY MEDICINE

## 2020-09-17 PROCEDURE — 84443 ASSAY THYROID STIM HORMONE: CPT | Performed by: FAMILY MEDICINE

## 2020-09-17 PROCEDURE — 90686 IIV4 VACC NO PRSV 0.5 ML IM: CPT

## 2020-09-17 PROCEDURE — 85025 COMPLETE CBC W/AUTO DIFF WBC: CPT | Performed by: FAMILY MEDICINE

## 2020-09-17 PROCEDURE — G0103 PSA SCREENING: HCPCS

## 2020-09-17 PROCEDURE — 80053 COMPREHEN METABOLIC PANEL: CPT | Performed by: FAMILY MEDICINE

## 2020-09-17 PROCEDURE — 90471 IMMUNIZATION ADMIN: CPT

## 2020-09-17 PROCEDURE — 36415 COLL VENOUS BLD VENIPUNCTURE: CPT | Performed by: FAMILY MEDICINE

## 2020-09-17 PROCEDURE — 3725F SCREEN DEPRESSION PERFORMED: CPT | Performed by: FAMILY MEDICINE

## 2020-09-17 PROCEDURE — 99396 PREV VISIT EST AGE 40-64: CPT | Performed by: FAMILY MEDICINE

## 2020-09-17 PROCEDURE — 3074F SYST BP LT 130 MM HG: CPT | Performed by: FAMILY MEDICINE

## 2020-09-17 PROCEDURE — 1036F TOBACCO NON-USER: CPT | Performed by: FAMILY MEDICINE

## 2020-09-17 PROCEDURE — 86803 HEPATITIS C AB TEST: CPT

## 2020-09-17 NOTE — PROGRESS NOTES
Assessment/Plan:     Diagnoses and all orders for this visit:    Well adult exam    Benign essential hypertension  -     CBC and differential  -     Comprehensive metabolic panel    Impaired fasting glucose    Sarcoidosis    PAF (paroxysmal atrial fibrillation) (HCC)  -     TSH, 3rd generation with Free T4 reflex    Moderate mitral regurgitation    Left sided lacunar stroke ([facility] )    Need for hepatitis C screening test  -     Hepatitis C antibody; Future    Screening for prostate cancer  -     PSA, Total Screen; Future    Need for influenza vaccination  -     influenza vaccine, quadrivalent, 0 5 mL, preservative-free, for adult and pediatric patients 6 mos+ (AFLURIA, FLUARIX, FLULAVAL, FLUZONE)          Continue with current medications  Repeat labs  Flu vaccine today  He was not interested in cryosurgery for suspected AK left nose area-consider punch biopsy if any changes  OV 1 year  Follow up with Cardiology  Patient ID: Telma Morris is a 62 y o  male  32-year-old male here for Wellness exam   Medications reviewed  Hospitalizations/surgery status post CVA  Tonsillectomy  Cataract surgery  Nasal septal surgery  Family history type 2 diabetes mellitus mother, sister  Hypertension mother  Hypertension blood pressures have been stable on Metoprolol ER 25 mg 1/2 tablet daily  05/2020 creatinine 1 21  GFR 66  Electrolytes normal except for K+ 3 4  Impaired fasting glucose 05/2020 FBS 89 decreased from 104  S/p CVA not on statin therapy  05/2020 lipid profile cholesterol 167  Triglycerides 91  HDL 38         Lab Results   Component Value Date    WBC 5 37 05/15/2019    HGB 16 0 05/15/2019    HCT 45 6 05/15/2019    MCV 86 05/15/2019     05/15/2019       Lab Results   Component Value Date    SODIUM 139 05/13/2020    K 3 4 (L) 05/13/2020     05/13/2020    CO2 27 05/13/2020    BUN 21 05/13/2020    CREATININE 1 21 05/13/2020    GLUC 84 09/08/2017    CALCIUM 8 3 05/13/2020     Lab Results Component Value Date    CHOLESTEROL 167 05/13/2020    CHOLESTEROL 166 05/15/2019    CHOLESTEROL 153 05/16/2018     Lab Results   Component Value Date    HDL 38 (L) 05/13/2020    HDL 39 (L) 05/15/2019    HDL 34 (L) 05/16/2018     Lab Results   Component Value Date    TRIG 91 05/13/2020    TRIG 105 05/15/2019    TRIG 106 05/16/2018     Lab Results   Component Value Date    LDLCALC 111 (H) 05/13/2020         The following portions of the patient's history were reviewed and updated as appropriate: allergies, current medications, past family history, past medical history, past social history, past surgical history and problem list     Review of Systems   Constitutional: Positive for unexpected weight change (19 lb weight loss from 10/2019 with dieting )  Negative for appetite change, chills and fever  HENT: Negative for congestion, ear pain, rhinorrhea, sore throat and trouble swallowing  Eyes: Negative for visual disturbance  Status post cataract surgery OS   Respiratory: Negative for cough, shortness of breath and wheezing  Sarcoidosis  No longer on Prednisone  Mild sleep apnea on previous sleep study not using CPAP at this time  No orthopnea or PND   Cardiovascular: Negative for chest pain, palpitations and leg swelling  Paroxysmal atrial fibrillation followed by Cardiology on Tikosyn and Eliquis  Loop recorder in place  01/2020 echocardiogram normal left ventricular systolic function  EF 60%  No regional wall motion abnormalities  Mild to moderate mitral regurgitation  EKG 12/2018 NSR  02/2018 exercise stress test normal   Gastrointestinal: Negative for abdominal pain, blood in stool, constipation, diarrhea, nausea and vomiting         06/2017 colonoscopy mild diverticulosis in the ascending, descending and transverse colon  Moderately severe diverticulosis found in the rectosigmoid junction and sigmoid colon    Internal hemorrhoids distal rectum   Endocrine: Negative for polydipsia and polyuria  Genitourinary: Negative for difficulty urinating         + ED  He uses prn generic Viagra  Decreased libido  Component                Value               Date                       PSA                      0 7                 05/15/2019                 PSA                      0 7                 05/16/2018                 PSA                      0 6                 06/27/2015               Musculoskeletal: Negative for arthralgias, joint swelling and myalgias  Intermittent pain and stiffness shoulders  Left handed  Skin: Negative for rash  Allergic/Immunologic: Negative for environmental allergies  Neurological: Negative for dizziness and headaches  02/2017 status post CVA  No residual neurologic deficits  MRI brain small acute cortical infarction lateral posterior left frontal convexity not visible by CTA  No additional focal brain parenchymal abnormalities  Right maxillary sinus mucous retention cyst   Hematological: Negative for adenopathy  Does not bruise/bleed easily  Psychiatric/Behavioral: Negative for dysphoric mood and sleep disturbance  Objective:      /72   Pulse 72   Temp 97 7 °F (36 5 °C)   Resp 16   Ht 6' (1 829 m)   Wt 81 2 kg (179 lb)   BMI 24 28 kg/m²     BP Readings from Last 3 Encounters:   09/17/20 112/72   05/07/20 117/76   10/15/19 122/70       Wt Readings from Last 3 Encounters:   09/17/20 81 2 kg (179 lb)   05/07/20 88 kg (194 lb)   10/15/19 89 8 kg (198 lb)        Physical Exam  Vitals signs and nursing note reviewed  Constitutional:       General: He is not in acute distress  Appearance: He is well-developed  HENT:      Right Ear: Tympanic membrane normal       Left Ear: Tympanic membrane normal    Eyes:      General: No scleral icterus  Conjunctiva/sclera: Conjunctivae normal       Pupils: Pupils are equal, round, and reactive to light  Neck:      Thyroid: No thyroid mass or thyromegaly  Vascular: No carotid bruit or JVD  Trachea: No tracheal deviation  Cardiovascular:      Rate and Rhythm: Normal rate and regular rhythm  Pulses:           Carotid pulses are 2+ on the right side and 2+ on the left side  Heart sounds: Normal heart sounds  No murmur  No gallop  Pulmonary:      Effort: Pulmonary effort is normal  No respiratory distress  Breath sounds: Normal breath sounds  No wheezing or rales  Abdominal:      General: Bowel sounds are normal  There is no distension or abdominal bruit  Palpations: Abdomen is soft  There is no mass  Tenderness: There is no abdominal tenderness  There is no guarding or rebound  Musculoskeletal: Normal range of motion  Lymphadenopathy:      Cervical: No cervical adenopathy  Skin:     Findings: Lesion present  No rash  Nails: There is no clubbing  Comments: Suspected small AK left side of nose  Neurological:      General: No focal deficit present  Mental Status: He is alert and oriented to person, place, and time  Cranial Nerves: No cranial nerve deficit     Psychiatric:         Mood and Affect: Mood normal          Behavior: Behavior normal

## 2020-09-18 NOTE — RESULT ENCOUNTER NOTE
Kassandra Mustafa, all of your labs are normal except for your creatinine or kidney function at 1 31  normal range 0 6 to 1 30  Your creatinine 4 months ago 1 21  your last creatinine last year 1 34  repeat potassium normal  I would continue with your current medications  Stay hydrated  Avoid medications such as Aleve or Advil         Current Outpatient Medications:     apixaban (Eliquis) 5 mg, Take 1 tablet (5 mg total) by mouth 2 (two) times a day, Disp: 60 tablet, Rfl: 11    Ascorbic Acid (VITAMIN C) 1000 MG tablet, Take 1,000 Doses by mouth, Disp: , Rfl:     BIOTIN PO, Take 1 capsule by mouth daily, Disp: , Rfl:     dofetilide (TIKOSYN) 500 mcg capsule, Take 1 capsule (500 mcg total) by mouth every 12 (twelve) hours, Disp: 60 capsule, Rfl: 11    Ferrous Sulfate (IRON) 325 (65 Fe) MG TABS, Take 1 Dose by mouth 3 (three) times a week QOD, Disp: , Rfl:     fluticasone (FLONASE) 50 mcg/act nasal spray, 50 sprays into each nostril daily, Disp: , Rfl:     Glucosamine HCl (GLUCOSAMINE PO), Take 1 capsule by mouth, Disp: , Rfl:     lisinopril (ZESTRIL) 10 mg tablet, Take 1 tablet (10 mg total) by mouth daily, Disp: 30 tablet, Rfl: 11    metoprolol succinate (TOPROL-XL) 25 mg 24 hr tablet, Take 0 5 tablets (12 5 mg total) by mouth daily, Disp: 30 tablet, Rfl: 11    sildenafil (REVATIO) 20 mg tablet, Take 3-5 tabelts 1 hour prior to intercourse , Disp: 30 tablet, Rfl: 1    VITAMIN B COMPLEX-C PO, Take 1 Dose by mouth, Disp: , Rfl:     vitamin E, tocopherol, 400 units capsule, Take 1 Dose by mouth daily, Disp: , Rfl:

## 2020-10-30 DIAGNOSIS — I48.91 ATRIAL FIBRILLATION, UNSPECIFIED TYPE (HCC): ICD-10-CM

## 2020-11-02 RX ORDER — DOFETILIDE 0.5 MG/1
500 CAPSULE ORAL EVERY 12 HOURS SCHEDULED
Qty: 60 CAPSULE | Refills: 11 | Status: SHIPPED | OUTPATIENT
Start: 2020-11-02 | End: 2021-07-08 | Stop reason: SDUPTHER

## 2020-11-19 ENCOUNTER — REMOTE DEVICE CLINIC VISIT (OUTPATIENT)
Dept: CARDIOLOGY CLINIC | Facility: CLINIC | Age: 60
End: 2020-11-19
Payer: COMMERCIAL

## 2020-11-19 DIAGNOSIS — Z95.818 PRESENCE OF OTHER CARDIAC IMPLANTS AND GRAFTS: Primary | ICD-10-CM

## 2020-11-19 PROCEDURE — G2066 INTER DEVC REMOTE 30D: HCPCS | Performed by: INTERNAL MEDICINE

## 2020-11-19 PROCEDURE — 93298 REM INTERROG DEV EVAL SCRMS: CPT | Performed by: INTERNAL MEDICINE

## 2020-12-18 ENCOUNTER — OFFICE VISIT (OUTPATIENT)
Dept: CARDIOLOGY CLINIC | Facility: CLINIC | Age: 60
End: 2020-12-18
Payer: COMMERCIAL

## 2020-12-18 VITALS
HEIGHT: 72 IN | WEIGHT: 180 LBS | DIASTOLIC BLOOD PRESSURE: 76 MMHG | BODY MASS INDEX: 24.38 KG/M2 | SYSTOLIC BLOOD PRESSURE: 110 MMHG | HEART RATE: 71 BPM

## 2020-12-18 DIAGNOSIS — I10 BENIGN ESSENTIAL HYPERTENSION: Primary | ICD-10-CM

## 2020-12-18 DIAGNOSIS — I77.810 AORTIC ROOT DILATATION (HCC): ICD-10-CM

## 2020-12-18 DIAGNOSIS — I48.0 PAF (PAROXYSMAL ATRIAL FIBRILLATION) (HCC): ICD-10-CM

## 2020-12-18 DIAGNOSIS — I34.0 MODERATE MITRAL REGURGITATION: ICD-10-CM

## 2020-12-18 DIAGNOSIS — I34.1 MITRAL VALVE PROLAPSE: ICD-10-CM

## 2020-12-18 PROCEDURE — 3074F SYST BP LT 130 MM HG: CPT | Performed by: INTERNAL MEDICINE

## 2020-12-18 PROCEDURE — 1036F TOBACCO NON-USER: CPT | Performed by: INTERNAL MEDICINE

## 2020-12-18 PROCEDURE — 3008F BODY MASS INDEX DOCD: CPT | Performed by: INTERNAL MEDICINE

## 2020-12-18 PROCEDURE — 3078F DIAST BP <80 MM HG: CPT | Performed by: INTERNAL MEDICINE

## 2020-12-18 PROCEDURE — 93000 ELECTROCARDIOGRAM COMPLETE: CPT | Performed by: INTERNAL MEDICINE

## 2020-12-18 PROCEDURE — 99214 OFFICE O/P EST MOD 30 MIN: CPT | Performed by: INTERNAL MEDICINE

## 2021-01-13 ENCOUNTER — TELEPHONE (OUTPATIENT)
Dept: CARDIOLOGY CLINIC | Facility: CLINIC | Age: 61
End: 2021-01-13

## 2021-01-13 DIAGNOSIS — I63.132 CEREBROVASCULAR ACCIDENT (CVA) DUE TO EMBOLISM OF LEFT CAROTID ARTERY (HCC): Primary | ICD-10-CM

## 2021-01-13 NOTE — TELEPHONE ENCOUNTER
----- Message from Jonnathan Forrester sent at 1/8/2021  8:39 AM EST -----  Regarding: FW: Loop battery RRT  Please schedule pt for  loop explant as pr JM note  Spoke to pt yesterday he is aware  Thanks  ----- Message -----  From: Josh Bassett MD  Sent: 1/7/2021   3:17 PM EST  To: Jonnathan Forrester  Subject: RE: Loop battery RRT                             Yes please  ----- Message -----  From: Jonnathan Forrester  Sent: 1/7/2021  10:24 AM EST  To: Josh Bassett MD  Subject: Loop battery RRT                                 Please see loop alert below for low battery  Would you like pt scheduled for explant? NON-BILLABLE  CARELINK TRANSMISSION: ALERT FOR LOW BATTERY  BATTERY RRT SINCE JAN 6, 2021  TASKED TO SAMARIA FOR POSSIBLE EXPLANT  1 AF EPSIODE W/  EGRAM SUGGESTING SR W/ PACs, PVCs [ BIGEMINAL AT TIMES] AND OVERSENSING  CAN NOT R/O AF DUE TO NOISE  AF BURDEN = 0%   DL

## 2021-01-13 NOTE — TELEPHONE ENCOUNTER
COVID Pre-Visit Screening     1  Is this a family member screening? Yes  2  Have you traveled outside of your state in the past 2 weeks? No  3  Do you presently have a fever or flu-like symptoms? No  4  Do you have symptoms of an upper respiratory infection like runny nose, sore throat, or cough? No  5  Are you suffering from new headache that you have not had in the past?  No  6  Do you have/have you experienced any new shortness of breath recently? No  7  Do you have any new diarrhea, nausea or vomiting? No  8  Have you been in contact with anyone who has been sick or diagnosed with COVID-19? No  9  Do you have any new loss of taste or smell? No  10  Are you able to wear a mask without a valve for the entire visit? Yes    Patient schedule for Loop explant at [facility] on 2/18/21 with Dr Ronnell Gardner  Patient aware of general instructions and blood test require  Please Clover Cavazos can you check for insurance approval     Dr Ronnell Gardner, patient is on Eliquis can you please advice medications holds

## 2021-02-08 ENCOUNTER — LAB (OUTPATIENT)
Dept: LAB | Facility: CLINIC | Age: 61
End: 2021-02-08
Payer: COMMERCIAL

## 2021-02-08 LAB
ALBUMIN SERPL BCP-MCNC: 3.8 G/DL (ref 3.5–5)
ALP SERPL-CCNC: 51 U/L (ref 46–116)
ALT SERPL W P-5'-P-CCNC: 19 U/L (ref 12–78)
ANION GAP SERPL CALCULATED.3IONS-SCNC: 8 MMOL/L (ref 4–13)
AST SERPL W P-5'-P-CCNC: 14 U/L (ref 5–45)
BASOPHILS # BLD AUTO: 0.06 THOUSANDS/ΜL (ref 0–0.1)
BASOPHILS NFR BLD AUTO: 1 % (ref 0–1)
BILIRUB SERPL-MCNC: 0.48 MG/DL (ref 0.2–1)
BUN SERPL-MCNC: 19 MG/DL (ref 5–25)
CALCIUM SERPL-MCNC: 8.1 MG/DL (ref 8.3–10.1)
CHLORIDE SERPL-SCNC: 103 MMOL/L (ref 100–108)
CO2 SERPL-SCNC: 28 MMOL/L (ref 21–32)
CREAT SERPL-MCNC: 1.25 MG/DL (ref 0.6–1.3)
EOSINOPHIL # BLD AUTO: 0.44 THOUSAND/ΜL (ref 0–0.61)
EOSINOPHIL NFR BLD AUTO: 6 % (ref 0–6)
ERYTHROCYTE [DISTWIDTH] IN BLOOD BY AUTOMATED COUNT: 12.3 % (ref 11.6–15.1)
GFR SERPL CREATININE-BSD FRML MDRD: 63 ML/MIN/1.73SQ M
GLUCOSE SERPL-MCNC: 97 MG/DL (ref 65–140)
HCT VFR BLD AUTO: 41.8 % (ref 36.5–49.3)
HGB BLD-MCNC: 13.7 G/DL (ref 12–17)
IMM GRANULOCYTES # BLD AUTO: 0.02 THOUSAND/UL (ref 0–0.2)
IMM GRANULOCYTES NFR BLD AUTO: 0 % (ref 0–2)
LYMPHOCYTES # BLD AUTO: 2.03 THOUSANDS/ΜL (ref 0.6–4.47)
LYMPHOCYTES NFR BLD AUTO: 30 % (ref 14–44)
MCH RBC QN AUTO: 29.6 PG (ref 26.8–34.3)
MCHC RBC AUTO-ENTMCNC: 32.8 G/DL (ref 31.4–37.4)
MCV RBC AUTO: 90 FL (ref 82–98)
MONOCYTES # BLD AUTO: 0.68 THOUSAND/ΜL (ref 0.17–1.22)
MONOCYTES NFR BLD AUTO: 10 % (ref 4–12)
NEUTROPHILS # BLD AUTO: 3.62 THOUSANDS/ΜL (ref 1.85–7.62)
NEUTS SEG NFR BLD AUTO: 53 % (ref 43–75)
NRBC BLD AUTO-RTO: 0 /100 WBCS
PLATELET # BLD AUTO: 192 THOUSANDS/UL (ref 149–390)
PMV BLD AUTO: 9.9 FL (ref 8.9–12.7)
POTASSIUM SERPL-SCNC: 4.1 MMOL/L (ref 3.5–5.3)
PROT SERPL-MCNC: 6 G/DL (ref 6.4–8.2)
RBC # BLD AUTO: 4.63 MILLION/UL (ref 3.88–5.62)
SODIUM SERPL-SCNC: 139 MMOL/L (ref 136–145)
WBC # BLD AUTO: 6.85 THOUSAND/UL (ref 4.31–10.16)

## 2021-02-08 PROCEDURE — 80053 COMPREHEN METABOLIC PANEL: CPT

## 2021-02-08 PROCEDURE — 36415 COLL VENOUS BLD VENIPUNCTURE: CPT

## 2021-02-08 PROCEDURE — 85025 COMPLETE CBC W/AUTO DIFF WBC: CPT

## 2021-02-15 NOTE — TELEPHONE ENCOUNTER
Called patient and LVM in regard Eliquis needs to be hold the morning of the procedure, per Dr Pozo Arrow

## 2021-03-10 DIAGNOSIS — Z23 ENCOUNTER FOR IMMUNIZATION: ICD-10-CM

## 2021-03-12 ENCOUNTER — IMMUNIZATIONS (OUTPATIENT)
Dept: FAMILY MEDICINE CLINIC | Facility: HOSPITAL | Age: 61
End: 2021-03-12

## 2021-03-12 DIAGNOSIS — Z23 ENCOUNTER FOR IMMUNIZATION: Primary | ICD-10-CM

## 2021-03-12 PROCEDURE — 0001A SARS-COV-2 / COVID-19 MRNA VACCINE (PFIZER-BIONTECH) 30 MCG: CPT

## 2021-03-12 PROCEDURE — 91300 SARS-COV-2 / COVID-19 MRNA VACCINE (PFIZER-BIONTECH) 30 MCG: CPT

## 2021-04-02 ENCOUNTER — IMMUNIZATIONS (OUTPATIENT)
Dept: FAMILY MEDICINE CLINIC | Facility: HOSPITAL | Age: 61
End: 2021-04-02

## 2021-04-02 DIAGNOSIS — Z23 ENCOUNTER FOR IMMUNIZATION: Primary | ICD-10-CM

## 2021-04-02 PROCEDURE — 91300 SARS-COV-2 / COVID-19 MRNA VACCINE (PFIZER-BIONTECH) 30 MCG: CPT

## 2021-04-02 PROCEDURE — 0002A SARS-COV-2 / COVID-19 MRNA VACCINE (PFIZER-BIONTECH) 30 MCG: CPT

## 2021-04-06 ENCOUNTER — TELEPHONE (OUTPATIENT)
Dept: MEDSURG UNIT | Facility: HOSPITAL | Age: 61
End: 2021-04-06

## 2021-04-07 ENCOUNTER — HOSPITAL ENCOUNTER (OUTPATIENT)
Dept: NON INVASIVE DIAGNOSTICS | Facility: HOSPITAL | Age: 61
Discharge: HOME | End: 2021-04-07
Attending: INTERNAL MEDICINE | Admitting: INTERNAL MEDICINE
Payer: COMMERCIAL

## 2021-04-07 VITALS
DIASTOLIC BLOOD PRESSURE: 70 MMHG | TEMPERATURE: 97.8 F | HEIGHT: 72 IN | RESPIRATION RATE: 12 BRPM | BODY MASS INDEX: 23.7 KG/M2 | WEIGHT: 175 LBS | SYSTOLIC BLOOD PRESSURE: 124 MMHG | HEART RATE: 84 BPM

## 2021-04-07 DIAGNOSIS — I63.132 CEREBROVASCULAR ACCIDENT (CVA) DUE TO EMBOLISM OF LEFT CAROTID ARTERY (HCC): ICD-10-CM

## 2021-04-07 PROCEDURE — 33286 RMVL SUBQ CAR RHYTHM MNTR: CPT | Performed by: INTERNAL MEDICINE

## 2021-04-07 PROCEDURE — NC001 PR NO CHARGE: Performed by: INTERNAL MEDICINE

## 2021-04-07 PROCEDURE — 33286 RMVL SUBQ CAR RHYTHM MNTR: CPT

## 2021-04-07 RX ORDER — LIDOCAINE HYDROCHLORIDE 10 MG/ML
INJECTION, SOLUTION EPIDURAL; INFILTRATION; INTRACAUDAL; PERINEURAL
Status: DISCONTINUED
Start: 2021-04-07 | End: 2021-04-07 | Stop reason: HOSPADM

## 2021-04-07 RX ORDER — LIDOCAINE HYDROCHLORIDE 10 MG/ML
INJECTION, SOLUTION EPIDURAL; INFILTRATION; INTRACAUDAL; PERINEURAL CODE/TRAUMA/SEDATION MEDICATION
Status: COMPLETED | OUTPATIENT
Start: 2021-04-07 | End: 2021-04-07

## 2021-04-07 RX ADMIN — LIDOCAINE HYDROCHLORIDE 10 ML: 10 INJECTION, SOLUTION EPIDURAL; INFILTRATION; INTRACAUDAL; PERINEURAL at 08:12

## 2021-04-07 NOTE — H&P
Electrophysiology-Cardiology (EP)   Catha Fontan 62 y o  male MRN: [id number]  Unit/Bed#: P3 PROCEDURE 4 Encounter: [id number]        IMPRESSION:  1  Loop recorder end of service  2  Paroxysmal afib      PLAN:  1  Loop explant              Referring Physian: Christofer Ureña DO    Chief Complain/Reason for Referal: loop end of service  Yenny Chavez is a 62 y o     Patient Active Problem List    Diagnosis Date Noted    Drug-induced erectile dysfunction 06/11/2019     Priority: Low    Obstructive sleep apnea 08/30/2017     Priority: Low    PAF (paroxysmal atrial fibrillation) ([address] ) 07/06/2017     Priority: Low    Moderate mitral regurgitation 05/24/2017     Priority: Low    Mitral valve prolapse 05/12/2017     Priority: Low    Aortic root dilatation ([address] ) 03/15/2017     Priority: Low    Left sided lacunar stroke ([address] ) 03/07/2017     Priority: Low    Cerebrovascular accident (CVA) ([address] ) 02/27/2017     Priority: Low    CKD (chronic kidney disease) 02/19/2017     Priority: Low    Sarcoidosis 02/19/2017     Priority: Low    Benign essential hypertension 01/29/2014     Priority: Low    Allergic rhinitis 03/29/2013     Priority: Low             Past Medical History:   Diagnosis Date    Allergic rhinitis     Aortic root dilatation (HCC)     Asthma     Benign essential hypertension     Cervical lymphadenopathy     CKD (chronic kidney disease)     CVA (cerebral vascular accident) ([address] )     Elevated blood pressure reading     last assessed 01/08/14    Erectile dysfunction of non-organic origin     Fluttering heart     Hypercalcemia     last assessed 05/19/16    Hypertension     Lacunar stroke ([address] )     Mitral valve prolapse     Moderate mitral regurgitation     [facility]     Palpitations     last assessed 03/29/13    Renal insufficiency     last assessed 09/23/13    Sarcoidosis     Sarcoidosis     TIA (transient ischemic attack)        Medications Prior to Admission   Medication    apixaban (Eliquis) 5 mg    Ascorbic Acid (VITAMIN C) 1000 MG tablet    BIOTIN PO    dofetilide (TIKOSYN) 500 mcg capsule    Ferrous Sulfate (IRON) 325 (65 Fe) MG TABS    fluticasone (FLONASE) 50 mcg/act nasal spray    Glucosamine HCl (GLUCOSAMINE PO)    lisinopril (ZESTRIL) 10 mg tablet    metoprolol succinate (TOPROL-XL) 25 mg 24 hr tablet    Multiple Vitamins-Minerals (PRESERVISION AREDS 2+MULTI VIT PO)    sildenafil (REVATIO) 20 mg tablet    VITAMIN B COMPLEX-C PO    vitamin E, tocopherol, 400 units capsule       Scheduled Meds:  Continuous Infusions:No current facility-administered medications for this encounter  PRN Meds:  No Known Allergies  I reviewed the Home Medication list in the chart       Family History   Problem Relation Age of Onset    Heart failure Mother     Diabetes Mother     Hypertension Mother     Cerebral aneurysm Father     Diabetes Sister     Heart attack Neg Hx     Stroke Neg Hx     Clotting disorder Neg Hx     Hyperlipidemia Neg Hx        Social History     Socioeconomic History    Marital status:      Spouse name: Not on file    Number of children: Not on file    Years of education: Not on file    Highest education level: Not on file   Occupational History    Occupation: currently works full time   Social Needs    Financial resource strain: Not on file    Food insecurity     Worry: Not on file     Inability: Not on file   [employer] needs     Medical: Not on file     Non-medical: Not on file   Tobacco Use    Smoking status: Never Smoker    Smokeless tobacco: Never Used   Substance and Sexual Activity    Alcohol use: Yes     Comment: rare    Drug use: No    Sexual activity: Not on file   Lifestyle    Physical activity     Days per week: Not on file     Minutes per session: Not on file    Stress: Not on file   Relationships    Social connections     Talks on phone: Not on file     Gets together: Not on file Attends [employer] service: Not on file     Active member of club or organization: Not on file     Attends meetings of clubs or organizations: Not on file     Relationship status: Not on file    Intimate partner violence     Fear of current or ex partner: Not on file     Emotionally abused: Not on file     Physically abused: Not on file     Forced sexual activity: Not on file   Other Topics Concern    Not on file   Social History Narrative    IADL's       Review of Systems -12 Point ROS reviewed and are negative or noted in chart except for Pertinent Positives Pertaining to Cardiovascular and Respiratory in HPI above  Vitals:    21 0700   BP: 124/70   Pulse: 84   Resp: 12   Temp: 97 8 °F (36 6 °C)     Vitals:    21 0700   Weight: 79 4 kg (175 lb)   No intake or output data in the 24 hours ending 21 0758  NAD  RRR  No edema  Lab Results:     CBC with diff:       Invalid input(s):  RBC, TOTALCELLSCOUNTED, SEGS%, GRANS%, LYMPHS%, EOS%, BASO%, ABNEUT, ABGRANS, ABLYMPHS, ABMOMOS, ABEOS, ABBASO      CMP:      Invalid input(s): ALBUMIN      BMP:      Invalid input(s): LABGLOM    BNP:   Results Reviewed     None        No results for input(s): BNP in the last 72 hours  Magnesium:       Coags:       TSH:       Lipid Profile:         Cardiac testing:   Results for orders placed during the hospital encounter of 01/15/20   Echo complete with contrast if indicated    Narrative [facility] [facility]  [phone]    Transthoracic Echocardiogram  2D, M-mode, Doppler, and Color Doppler    Study date:  15-Terrell-2020    Patient: Tigist Madden  MR number: [id number]  Account number: [de-identified]  : 1960  Age: 63 years  Gender: Male  Status: Outpatient  Location: [facility]  Height: 72 in  Weight: 198 lb  BP: 122/ 70 mmHg    Indications:  Aortic Aneurysm; PAF    Diagnoses: I35 9 - Nonrheumatic aortic valve disorder, unspecified    Sonographer:  DEMI Wilder, RDCS  Primary Physician:  Jennifer Irwin MD  Referring Physician:  Yojana Mendieta MD  Group:  [facility]  Interpreting Physician:  Kassidy Chung MD    SUMMARY    LEFT VENTRICLE:  Systolic function was normal  Ejection fraction was estimated to be 60 %  There were no regional wall motion abnormalities  Wall thickness was at the upper limits of normal     MITRAL VALVE:  There was systolic bowing, but without diagnostic evidence for prolapse  There was mild to moderate regurgitation  AORTIC VALVE:  There was mild regurgitation  AORTA:  The root was dilated at 4 2 cm  HISTORY: PRIOR HISTORY: MVP; Mitral regurgitation; CVA; Aortic aneurysm    PROCEDURE: The study was performed in the [facility]  This was a routine study  The transthoracic approach was used  The study included complete 2D imaging, M-mode, complete spectral Doppler, and color Doppler  The  heart rate was 68 bpm, at the start of the study  Images were obtained from the parasternal, apical, subcostal, and suprasternal notch acoustic windows  Image quality was adequate  LEFT VENTRICLE: Size was normal  Systolic function was normal  Ejection fraction was estimated to be 60 %  There were no regional wall motion abnormalities  Wall thickness was at the upper limits of normal  DOPPLER: There was an increased  relative contribution of atrial contraction to ventricular filling  Left ventricular diastolic function parameters were normal for the patient's age  RIGHT VENTRICLE: The size was normal  Systolic function was normal     LEFT ATRIUM: Size was normal     RIGHT ATRIUM: Size was normal     MITRAL VALVE: There was annular calcification  Valve structure was normal  There was mild thickening  There was normal leaflet separation  There was systolic bowing, but without diagnostic evidence for prolapse  DOPPLER: There was no  evidence for stenosis   There was mild to moderate regurgitation  AORTIC VALVE: The valve was trileaflet  Leaflets exhibited mildly increased thickness  DOPPLER: There was no evidence for stenosis  There was mild regurgitation  TRICUSPID VALVE: The valve structure was normal  There was normal leaflet separation  DOPPLER: There was no evidence for stenosis  There was trace regurgitation  Estimated peak PA pressure was 20 mmHg  PULMONIC VALVE: Leaflets exhibited normal thickness, no calcification, and normal cuspal separation  DOPPLER: The transpulmonic velocity was within the normal range  There was no regurgitation  PERICARDIUM: There was no pericardial effusion  The pericardium was normal in appearance  AORTA: The root was dilated at 4 2 cm  SYSTEMIC VEINS: IVC: The inferior vena cava was normal in size and course   Respirophasic changes were normal     SYSTEM MEASUREMENT TABLES    2D  %FS: 34 96 %  AV Diam: 3 77 cm  EDV(Teich): 111 84 ml  EF(Cube): 72 49 %  EF(Teich): 64 07 %  ESV(Cube): 32 01 ml  ESV(Teich): 40 19 ml  IVSd: 1 27 cm  LA Area: 14 9 cm2  LA Diam: 4 19 cm  LVEDV MOD A4C: 83 ml  LVEF MOD A4C: 54 49 %  LVESV MOD A4C: 37 77 ml  LVIDd: 4 88 cm  LVIDs: 3 18 cm  LVLd A4C: 7 9 cm  LVLs A4C: 6 69 cm  LVPWd: 1 15 cm  RA Area: 13 34 cm2  RV Diam: 3 41 cm  SV MOD A4C: 45 23 ml  SV(Cube): 84 34 ml  SV(Teich): 71 65 ml    CW  TR MaxP 26 mmHg  TR Vmax: 2 02 m/s    MM  TAPSE: 2 29 cm    PW  E': 0 1 m/s  E/E': 7 15  MV A Escobar: 0 83 m/s  MV Dec Bullitt: 3 83 m/s2  MV DecT: 177 69 ms  MV E Escobar: 0 68 m/s  MV E/A Ratio: 0 82    [employer] Accredited Echocardiography Laboratory    Prepared and electronically signed by    Adolfo Buchanan MD  Signed 15-Terrell-2020 13:01:37       Results for orders placed during the hospital encounter of 17   [facility]    Narrative [address]  [phone]    Transesophageal Echocardiogram  2D, Doppler, and Color Doppler    Study date:  24-Mar-2017    Patient: Niels Calvin  MR number: [id number]  Account number: [de-identified]  : 1960  Age: 55 years  Gender: Male  Status: Outpatient  Location: Cath lab  Height: 72 in  Weight: 184 lb  BP: 152/ 88 mmHg    Indications: AFIB    Diagnoses: I48 0 - Atrial fibrillation    Sonographer:  FERNANDO Staples  Interpreting Physician:  Gil Gee MD  Primary Physician:  Chelsea Morse DO  Referring Physician:  [employer], DO  Group:  [facility]    SUMMARY    LEFT VENTRICLE:  Systolic function was normal  Ejection fraction was estimated to be 60 %  Although no diagnostic regional wall motion abnormality was identified, this possibility cannot be completely excluded on the basis of this study  Wall thickness was increased  ATRIAL SEPTUM:  No defect or patent foramen ovale was identified after injection of agitated saline  MITRAL VALVE:  There was mild-moderate thickening  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  There was prolapse involving the anterior and posterior leaflets  There was moderate late systolic regurgitation  No evidence of systolic flow reversal in the pulmonary veins to suggest more significant regurgitation  AORTIC VALVE:  There was trace to mild regurgitation  AORTA:  The root was top normal size at 3 9 cm  HISTORY: PRIOR HISTORY: CKD, Sarcoidosis    PROCEDURE: The procedure was performed in the catheterization laboratory  This was a routine study  The risks and alternatives of the procedure were explained to the patient and informed consent was obtained  The transesophageal approach  was used  The study included complete 2D imaging, limited spectral Doppler, and color Doppler  The heart rate was 95 bpm, at the start of the study  An adult omniplane probe was inserted by the attending cardiologist  Intubated with ease  One intubation attempt(s)  There was no blood detected on the probe   Image quality was adequate  There were no complications during the procedure  MEDICATIONS: Anesthesia administered by anesthesia team     LEFT VENTRICLE: Size was normal  Systolic function was normal  Ejection fraction was estimated to be 60 %  Although no diagnostic regional wall motion abnormality was identified, this possibility cannot be completely excluded on the basis  of this study  Wall thickness was increased  DOPPLER: The ratio of early ventricular filling to atrial contraction velocities was within the normal range  RIGHT VENTRICLE: The size was normal  Systolic function was normal     LEFT ATRIUM: The atrium was mildly dilated  APPENDAGE: The size was normal  No thrombus was identified  DOPPLER: The function was normal (normal emptying velocity)  ATRIAL SEPTUM: No defect or patent foramen ovale was identified after injection of agitated saline  RIGHT ATRIUM: Size was normal  No thrombus was identified  MITRAL VALVE: There was mild-moderate thickening  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  There was prolapse involving the anterior and posterior leaflets  DOPPLER: There was moderate late systolic regurgitation  No evidence of systolic flow reversal in the pulmonary veins to suggest more significant regurgitation  AORTIC VALVE: The valve was trileaflet  Leaflets exhibited normal thickness and normal cuspal separation  There was no echocardiographic evidence of vegetation  DOPPLER: There was trace to mild regurgitation  TRICUSPID VALVE: The valve structure was normal  There was normal leaflet separation  There was no echocardiographic evidence of vegetation  DOPPLER: There was trace regurgitation  PULMONIC VALVE: Leaflets exhibited normal thickness, no calcification, and normal cuspal separation  There was no echocardiographic evidence of vegetation  PERICARDIUM: There was no pericardial effusion  The pericardium was normal in appearance      AORTA: The root was top normal size at 3 9 cm  There was no atheroma  There was no evidence for dissection  There was no evidence for aneurysm  [address] Accredited Echocardiography Laboratory    Prepared and electronically signed by    Whitney Ceballos MD  Signed 24-Mar-2017 16:49:30       No results found for this or any previous visit  No results found for this or any previous visit

## 2021-04-19 ENCOUNTER — IN-CLINIC DEVICE VISIT (OUTPATIENT)
Dept: CARDIOLOGY CLINIC | Facility: CLINIC | Age: 61
End: 2021-04-19

## 2021-04-19 DIAGNOSIS — Z95.818 PRESENCE OF OTHER CARDIAC IMPLANTS AND GRAFTS: Primary | ICD-10-CM

## 2021-04-19 PROCEDURE — 99024 POSTOP FOLLOW-UP VISIT: CPT | Performed by: INTERNAL MEDICINE

## 2021-04-19 NOTE — PROGRESS NOTES
PATIENT SEEN IN THE [facility] OFFICE: EXPLANT ILR- WOUND CHECK: INCISION CLEAN AND DRY WITH EDGES APPROXIMATED; SUTURES/SURGICAL GLUE REMOVED; WOUND CARE AND RESTRICTIONS REVIEWED WITH PATIENT   GENERAL CARDIOLOGY F/U WITH DR Valerie Serrano      EBS

## 2021-05-01 ENCOUNTER — APPOINTMENT (EMERGENCY)
Dept: CT IMAGING | Facility: HOSPITAL | Age: 61
End: 2021-05-01
Payer: COMMERCIAL

## 2021-05-01 ENCOUNTER — HOSPITAL ENCOUNTER (EMERGENCY)
Facility: HOSPITAL | Age: 61
Discharge: HOME | End: 2021-05-01
Attending: EMERGENCY MEDICINE | Admitting: EMERGENCY MEDICINE
Payer: COMMERCIAL

## 2021-05-01 VITALS
DIASTOLIC BLOOD PRESSURE: 58 MMHG | TEMPERATURE: 98.6 F | RESPIRATION RATE: 18 BRPM | OXYGEN SATURATION: 98 % | HEART RATE: 82 BPM | SYSTOLIC BLOOD PRESSURE: 118 MMHG

## 2021-05-01 DIAGNOSIS — R81 GLUCOSURIA: ICD-10-CM

## 2021-05-01 DIAGNOSIS — K80.20 CHOLELITHIASIS: ICD-10-CM

## 2021-05-01 DIAGNOSIS — K57.92 ACUTE DIVERTICULITIS: Primary | ICD-10-CM

## 2021-05-01 LAB
ALBUMIN SERPL BCP-MCNC: 3.9 G/DL (ref 3.5–5)
ALP SERPL-CCNC: 61 U/L (ref 46–116)
ALT SERPL W P-5'-P-CCNC: 15 U/L (ref 12–78)
ANION GAP SERPL CALCULATED.3IONS-SCNC: 10 MMOL/L (ref 4–13)
AST SERPL W P-5'-P-CCNC: 13 U/L (ref 5–45)
BASOPHILS # BLD AUTO: 0.03 THOUSANDS/ΜL (ref 0–0.1)
BASOPHILS NFR BLD AUTO: 0 % (ref 0–1)
BILIRUB SERPL-MCNC: 1.05 MG/DL (ref 0.2–1)
BILIRUB UR QL STRIP: NEGATIVE
BUN SERPL-MCNC: 16 MG/DL (ref 5–25)
CALCIUM SERPL-MCNC: 8.5 MG/DL (ref 8.3–10.1)
CHLORIDE SERPL-SCNC: 104 MMOL/L (ref 100–108)
CLARITY UR: CLEAR
CO2 SERPL-SCNC: 25 MMOL/L (ref 21–32)
COLOR UR: ABNORMAL
CREAT SERPL-MCNC: 1.37 MG/DL (ref 0.6–1.3)
EOSINOPHIL # BLD AUTO: 0.03 THOUSAND/ΜL (ref 0–0.61)
EOSINOPHIL NFR BLD AUTO: 0 % (ref 0–6)
ERYTHROCYTE [DISTWIDTH] IN BLOOD BY AUTOMATED COUNT: 12.4 % (ref 11.6–15.1)
GFR SERPL CREATININE-BSD FRML MDRD: 56 ML/MIN/1.73SQ M
GLUCOSE SERPL-MCNC: 122 MG/DL (ref 65–140)
GLUCOSE UR STRIP-MCNC: ABNORMAL MG/DL
HCT VFR BLD AUTO: 43.3 % (ref 36.5–49.3)
HGB BLD-MCNC: 14.9 G/DL (ref 12–17)
HGB UR QL STRIP.AUTO: NEGATIVE
IMM GRANULOCYTES # BLD AUTO: 0.05 THOUSAND/UL (ref 0–0.2)
IMM GRANULOCYTES NFR BLD AUTO: 0 % (ref 0–2)
KETONES UR STRIP-MCNC: ABNORMAL MG/DL
LACTATE SERPL-SCNC: 0.8 MMOL/L (ref 0.5–2)
LEUKOCYTE ESTERASE UR QL STRIP: NEGATIVE
LIPASE SERPL-CCNC: 102 U/L (ref 73–393)
LYMPHOCYTES # BLD AUTO: 0.88 THOUSANDS/ΜL (ref 0.6–4.47)
LYMPHOCYTES NFR BLD AUTO: 7 % (ref 14–44)
MCH RBC QN AUTO: 30.3 PG (ref 26.8–34.3)
MCHC RBC AUTO-ENTMCNC: 34.4 G/DL (ref 31.4–37.4)
MCV RBC AUTO: 88 FL (ref 82–98)
MONOCYTES # BLD AUTO: 1.16 THOUSAND/ΜL (ref 0.17–1.22)
MONOCYTES NFR BLD AUTO: 10 % (ref 4–12)
NEUTROPHILS # BLD AUTO: 10.08 THOUSANDS/ΜL (ref 1.85–7.62)
NEUTS SEG NFR BLD AUTO: 83 % (ref 43–75)
NITRITE UR QL STRIP: NEGATIVE
NRBC BLD AUTO-RTO: 0 /100 WBCS
PH UR STRIP.AUTO: 5.5 [PH]
PLATELET # BLD AUTO: 199 THOUSANDS/UL (ref 149–390)
PMV BLD AUTO: 10.4 FL (ref 8.9–12.7)
POTASSIUM SERPL-SCNC: 3.9 MMOL/L (ref 3.5–5.3)
PROT SERPL-MCNC: 6.8 G/DL (ref 6.4–8.2)
PROT UR STRIP-MCNC: NEGATIVE MG/DL
RBC # BLD AUTO: 4.92 MILLION/UL (ref 3.88–5.62)
SODIUM SERPL-SCNC: 139 MMOL/L (ref 136–145)
SP GR UR STRIP.AUTO: 1.02 (ref 1–1.03)
TROPONIN I SERPL-MCNC: <0.02 NG/ML
UROBILINOGEN UR QL STRIP.AUTO: 0.2 E.U./DL
WBC # BLD AUTO: 12.23 THOUSAND/UL (ref 4.31–10.16)

## 2021-05-01 PROCEDURE — 80053 COMPREHEN METABOLIC PANEL: CPT | Performed by: PHYSICIAN ASSISTANT

## 2021-05-01 PROCEDURE — 36415 COLL VENOUS BLD VENIPUNCTURE: CPT | Performed by: PHYSICIAN ASSISTANT

## 2021-05-01 PROCEDURE — 83605 ASSAY OF LACTIC ACID: CPT | Performed by: PHYSICIAN ASSISTANT

## 2021-05-01 PROCEDURE — G1004 CDSM NDSC: HCPCS

## 2021-05-01 PROCEDURE — 96361 HYDRATE IV INFUSION ADD-ON: CPT

## 2021-05-01 PROCEDURE — 96367 TX/PROPH/DG ADDL SEQ IV INF: CPT

## 2021-05-01 PROCEDURE — 83690 ASSAY OF LIPASE: CPT | Performed by: PHYSICIAN ASSISTANT

## 2021-05-01 PROCEDURE — 74177 CT ABD & PELVIS W/CONTRAST: CPT

## 2021-05-01 PROCEDURE — 99284 EMERGENCY DEPT VISIT MOD MDM: CPT | Performed by: PHYSICIAN ASSISTANT

## 2021-05-01 PROCEDURE — 81003 URINALYSIS AUTO W/O SCOPE: CPT | Performed by: PHYSICIAN ASSISTANT

## 2021-05-01 PROCEDURE — 93005 ELECTROCARDIOGRAM TRACING: CPT

## 2021-05-01 PROCEDURE — 99284 EMERGENCY DEPT VISIT MOD MDM: CPT

## 2021-05-01 PROCEDURE — 96365 THER/PROPH/DIAG IV INF INIT: CPT

## 2021-05-01 PROCEDURE — 87040 BLOOD CULTURE FOR BACTERIA: CPT | Performed by: PHYSICIAN ASSISTANT

## 2021-05-01 PROCEDURE — 85025 COMPLETE CBC W/AUTO DIFF WBC: CPT | Performed by: PHYSICIAN ASSISTANT

## 2021-05-01 PROCEDURE — 84484 ASSAY OF TROPONIN QUANT: CPT | Performed by: PHYSICIAN ASSISTANT

## 2021-05-01 RX ORDER — CIPROFLOXACIN 500 MG/1
500 TABLET, FILM COATED ORAL EVERY 12 HOURS SCHEDULED
Qty: 14 TABLET | Refills: 0 | Status: SHIPPED | OUTPATIENT
Start: 2021-05-01 | End: 2021-05-08

## 2021-05-01 RX ORDER — METRONIDAZOLE 500 MG/1
500 TABLET ORAL EVERY 8 HOURS SCHEDULED
Qty: 21 TABLET | Refills: 0 | Status: SHIPPED | OUTPATIENT
Start: 2021-05-01 | End: 2021-05-08

## 2021-05-01 RX ADMIN — METRONIDAZOLE 500 MG: 500 INJECTION, SOLUTION INTRAVENOUS at 11:54

## 2021-05-01 RX ADMIN — CEFTRIAXONE SODIUM 1000 MG: 10 INJECTION, POWDER, FOR SOLUTION INTRAVENOUS at 11:18

## 2021-05-01 RX ADMIN — IOHEXOL 100 ML: 350 INJECTION, SOLUTION INTRAVENOUS at 10:28

## 2021-05-01 RX ADMIN — SODIUM CHLORIDE 1000 ML: 0.9 INJECTION, SOLUTION INTRAVENOUS at 09:26

## 2021-05-01 NOTE — DISCHARGE INSTRUCTIONS
Diverticulitis   WHAT YOU NEED TO KNOW:   Diverticulitis is a condition that causes small pockets along your intestine called diverticula to become inflamed or infected  This is caused by hard bowel movements, food, or bacteria that get stuck in the pockets  DISCHARGE INSTRUCTIONS:   Return to the emergency department if:   · You have bowel movement or foul-smelling discharge leaking from your vagina or in your urine  · You have severe diarrhea  · You urinate less than usual or not at all  · You are not able to have a bowel movement  · You cannot stop vomiting  · You have severe abdominal pain, a fever, and your abdomen is larger than usual      · You have new or increased blood in your bowel movements  Contact your healthcare provider if:   · You have pain when you urinate  · Your symptoms get worse or do not go away  · You have questions or concerns about your condition or care  Medicines:   · Antibiotics  may be given to help treat a bacterial infection  · Prescription pain medicine  may be given  Ask your healthcare provider how to take this medicine safely  Some prescription pain medicines contain acetaminophen  Do not take other medicines that contain acetaminophen without talking to your healthcare provider  Too much acetaminophen may cause liver damage  Prescription pain medicine may cause constipation  Ask your healthcare provider how to prevent or treat constipation  · Take your medicine as directed  Contact your healthcare provider if you think your medicine is not helping or if you have side effects  Tell him or her if you are allergic to any medicine  Keep a list of the medicines, vitamins, and herbs you take  Include the amounts, and when and why you take them  Bring the list or the pill bottles to follow-up visits  Carry your medicine list with you in case of an emergency      Clear liquid diet:  A clear liquid diet includes any liquids that you can see through  Examples include water, ginger-leticia, cranberry or apple juice, frozen fruit ice, or broth  Stay on a clear liquid diet until your symptoms are gone, or as directed  Follow up with your healthcare provider as directed: You may need to return for a colonoscopy  When your symptoms are gone, you may need a low-fat, high-fiber diet to prevent diverticulitis from developing again  Your healthcare provider or dietitian can help you create meal plans  Write down your questions so you remember to ask them during your visits  © Copyright [address] Information is for End User's use only and may not be sold, redistributed or otherwise used for commercial purposes  All illustrations and images included in CareNotes® are the copyrighted property of [employer] D [employer] M [employer] Inc  or [facility]   The above information is an  only  It is not intended as medical advice for individual conditions or treatments  Talk to your doctor, nurse or pharmacist before following any medical regimen to see if it is safe and effective for you  Diverticulosis Diet   AMBULATORY CARE:   A diverticulosis diet  includes high-fiber foods  High-fiber foods help you have regular bowel movements  Extra fiber may decrease your risk of forming new diverticula (small pockets) in your intestine  A high-fiber diet may also help prevent diverticulitis  Diverticulitis is a painful condition that occurs when diverticula become inflamed or infected  You do not need to avoid nuts, seeds, corn, or popcorn while you are on a diverticulosis diet  Contact your healthcare provider if:   · You have questions about a high-fiber diet  · You have a change in your bowel movements  · You have an upset stomach  · You have a fever  · You have pain in your lower abdomen on the left side  · You have questions about your condition or care  Amount of fiber you need: You may need 25 to 35 grams of fiber each day   Ask your dietitian or healthcare provider how much fiber you should have  Increase your intake of fiber slowly  When you eat more fiber, you may have gas and feel bloated  You may need to take a fiber supplement if you do not get enough fiber from food  Drink plenty of liquids as you increase the fiber in your diet  Your dietitian or healthcare provider may recommend 8 eight-ounce cups or more each day  Ask which liquids are best for you  Foods that are high in fiber:   · Foods with at least 4 grams of fiber per serving:      ? ? to ½ cup of high-fiber cereal (check the nutrition label on the box)    ? ½ cup of blackberries or raspberries    ? 4 dried prunes    ? 1 cooked artichoke    ? ½ cup of cooked legumes, such as lentils, or red, kidney, and whittington beans    · Foods with 1 to 3 grams of fiber per serving:      ? 1 slice of whole-wheat, pumpernickel, or rye bread    ? 4 whole-wheat crackers    ? ½ cup of cereal with 1 to 3 grams of fiber per serving (check the nutrition label on the box)    ? 1 piece of fruit, such as an apple, banana, pear, kiwi, or orange    ? 3 dates    ? ½ cup of canned apricots, fruit cocktail, peaches, or pears    ? ½ cup of raw or cooked vegetables, such as carrots, cauliflower, cabbage, spinach, squash, or corn    © Copyright [address] Information is for End User's use only and may not be sold, redistributed or otherwise used for commercial purposes  All illustrations and images included in CareNotes® are the copyrighted property of [employer]  or [employer]  The above information is an  only  It is not intended as medical advice for individual conditions or treatments  Talk to your doctor, nurse or pharmacist before following any medical regimen to see if it is safe and effective for you

## 2021-05-01 NOTE — ED PROVIDER NOTES
History  Chief Complaint   Patient presents with    Abdominal Pain     pt c/o LLQ pain starting yesterday after a walk  denies N/V/D      Tierra Muhammad is a 62 y o  male with past medical history of CVA, atrial fibrillation, sarcoidosis, and hypertension who presents to the ED with complaints of constant aching left lower quadrant pain since last night  Patient states yesterday after walking he noticed a stabbing pain in his left lower quadrant which is worse with standing up and moving  Patient states last night he woke up to urinate in the middle night which is not normal for him  Patient states he is also having chills and hot flashes last night  Patient states he did take Excedrin without to obtain and patient states yesterday he had a normal bowel movement but today he noticed a smaller bowel movement  Wife states patient "loses his color" and can barely walk when the pain is at its worst  Patient reports previous colonoscopy at age 49 which was "normal " Denies nausea, vomiting, blood in stool, diarrhea, urinary urgency, hematuria, testicular pain, chest pain, SOB, fever, chills  Denies sick contacts  Patient is fully vaccinated against COVID 19  History provided by:  Patient  Abdominal Pain  Pain location:  LLQ  Pain quality: aching and stabbing    Duration:  2 days  Timing:  Constant  Worsened by:  Position changes and movement  Associated symptoms: chills    Associated symptoms: no anorexia, no chest pain, no constipation, no cough, no diarrhea, no dysuria, no fatigue, no fever, no hematochezia, no hematuria, no nausea, no shortness of breath, no sore throat and no vomiting        Prior to Admission Medications   Prescriptions Last Dose Informant Patient Reported? Taking?    Ascorbic Acid (VITAMIN C) 1000 MG tablet  Self Yes Yes   Sig: Take 1,000 Doses by mouth   BIOTIN PO  Self Yes Yes   Sig: Take 1 capsule by mouth daily   Ferrous Sulfate (IRON) 325 (65 Fe) MG TABS  Self Yes Yes   Sig: Take 1 Dose by mouth 3 (three) times a week QOD   Glucosamine HCl (GLUCOSAMINE PO)  Self Yes Yes   Sig: Take 1 capsule by mouth   Multiple Vitamins-Minerals (PRESERVISION AREDS 2+MULTI VIT PO)   Yes Yes   Sig: Take by mouth   VITAMIN B COMPLEX-C PO  Self Yes Yes   Sig: Take 1 Dose by mouth   apixaban (Eliquis) 5 mg  Self No Yes   Sig: Take 1 tablet (5 mg total) by mouth 2 (two) times a day   dofetilide (TIKOSYN) 500 mcg capsule  Self No Yes   Sig: Take 1 capsule (500 mcg total) by mouth every 12 (twelve) hours   fluticasone (FLONASE) 50 mcg/act nasal spray  Self Yes Yes   Si sprays into each nostril daily   lisinopril (ZESTRIL) 10 mg tablet  Self No Yes   Sig: Take 1 tablet (10 mg total) by mouth daily   metoprolol succinate (TOPROL-XL) 25 mg 24 hr tablet  Self No Yes   Sig: Take 0 5 tablets (12 5 mg total) by mouth daily   sildenafil (REVATIO) 20 mg tablet  Self No Yes   Sig: Take 3-5 tabelts 1 hour prior to intercourse     vitamin E, tocopherol, 400 units capsule  Self Yes Yes   Sig: Take 1 Dose by mouth daily      Facility-Administered Medications: None       Past Medical History:   Diagnosis Date    Allergic rhinitis     Aortic root dilatation (HCC)     Asthma     Benign essential hypertension     Cervical lymphadenopathy     CKD (chronic kidney disease)     [facility]     Elevated blood pressure reading     last assessed 14    Erectile dysfunction of non-organic origin     Fluttering heart     Hypercalcemia     last assessed 16    Hypertension     [facility]     Mitral valve prolapse     Moderate mitral regurgitation     [facility]     Palpitations     last assessed 13    Renal insufficiency     last assessed 13    Sarcoidosis     Sarcoidosis     TIA (transient ischemic attack)        Past Surgical History:   Procedure Laterality Date    CARDIAC CATHETERIZATION      CATARACT EXTRACTION      EYE SURGERY      INGUINAL HERNIA REPAIR      NASAL SEPTUM SURGERY  1984    Septal Deviation Repair    NH COLONOSCOPY FLX DX W/COLLJ SPEC WHEN PFRMD N/A 6/1/2017    Procedure: COLONOSCOPY;  Surgeon: Malou Avila MD;  [facility]; Service: Gastroenterology    TONSILLECTOMY  1982    TOOTH EXTRACTION         Family History   Problem Relation Age of Onset    Heart failure Mother     Diabetes Mother     Hypertension Mother     Cerebral aneurysm Father     Diabetes Sister     Heart attack Neg Hx     Stroke Neg Hx     Clotting disorder Neg Hx     Hyperlipidemia Neg Hx      I have reviewed and agree with the history as documented  E-Cigarette/Vaping     E-Cigarette/Vaping Substances     Social History     Tobacco Use    Smoking status: Never Smoker    Smokeless tobacco: Never Used   Substance Use Topics    Alcohol use: Yes     Comment: rare    Drug use: No       Review of Systems   Constitutional: Positive for chills  Negative for appetite change, fatigue, fever and unexpected weight change  HENT: Negative for congestion, drooling, ear pain, rhinorrhea, sore throat, trouble swallowing and voice change  Eyes: Negative for pain, discharge, redness and visual disturbance  Respiratory: Negative for cough, shortness of breath, wheezing and stridor  Cardiovascular: Negative for chest pain, palpitations and leg swelling  Gastrointestinal: Positive for abdominal pain  Negative for anorexia, blood in stool, constipation, diarrhea, hematochezia, nausea and vomiting  Genitourinary: Positive for frequency  Negative for dysuria, flank pain, hematuria and urgency  Musculoskeletal: Negative for gait problem, joint swelling, neck pain and neck stiffness  Skin: Negative for color change and rash  Neurological: Negative for dizziness, seizures, light-headedness and headaches  Physical Exam  Physical Exam  Vitals signs and nursing note reviewed     Constitutional:       Appearance: He is well-developed  HENT:      Head: Normocephalic and atraumatic  Nose: Nose normal    Eyes:      Conjunctiva/sclera: Conjunctivae normal       Pupils: Pupils are equal, round, and reactive to light  Neck:      Musculoskeletal: Normal range of motion and neck supple  Cardiovascular:      Rate and Rhythm: Tachycardia present  Rhythm irregularly irregular  Pulmonary:      Effort: Pulmonary effort is normal       Breath sounds: Normal breath sounds  Abdominal:      General: Abdomen is flat  Bowel sounds are normal       Palpations: Abdomen is soft  Tenderness: There is abdominal tenderness in the right lower quadrant and suprapubic area  There is left CVA tenderness (Mild lower) and rebound  There is no right CVA tenderness or guarding  Musculoskeletal: Normal range of motion  Skin:     General: Skin is warm and dry  Capillary Refill: Capillary refill takes less than 2 seconds  Neurological:      Mental Status: He is alert and oriented to person, place, and time           Vital Signs  ED Triage Vitals [05/01/21 0901]   Temperature Pulse Respirations Blood Pressure SpO2   98 6 °F (37 °C) (!) 108 18 128/87 96 %      Temp Source Heart Rate Source Patient Position - Orthostatic VS BP Location FiO2 (%)   Oral Monitor -- -- --      Pain Score       5           Vitals:    05/01/21 0901 05/01/21 1045   BP: 128/87    Pulse: (!) 108 90         Visual Acuity      ED Medications  Medications   metroNIDAZOLE (FLAGYL) IVPB (premix) 500 mg 100 mL (500 mg Intravenous New Bag 5/1/21 1154)   sodium chloride 0 9 % bolus 1,000 mL (0 mL Intravenous Stopped 5/1/21 1026)   iohexol (OMNIPAQUE) 350 MG/ML injection (MULTI-DOSE) 100 mL (100 mL Intravenous Given 5/1/21 1028)   ceftriaxone (ROCEPHIN) 1 g/50 mL in dextrose IVPB (0 mg Intravenous Stopped 5/1/21 1153)       Diagnostic Studies  Results Reviewed     Procedure Component Value Units Date/Time    UA w Reflex to Microscopic w Reflex to Culture [[id number]] (Abnormal) Collected: 05/01/21 1056    Lab Status: Final result Specimen: Urine, Clean Catch Updated: 05/01/21 1121     Color, UA Light Yellow     Clarity, UA Clear     Specific Gravity, UA 1 025     pH, UA 5 5     Leukocytes, UA Negative     Nitrite, UA Negative     Protein, UA Negative mg/dl      Glucose,  (1/10%) mg/dl      Ketones, UA 40 (2+) mg/dl      Urobilinogen, UA 0 2 E U /dl      Bilirubin, UA Negative     Blood, UA Negative    Comprehensive metabolic panel [[id number]]  (Abnormal) Collected: 05/01/21 0922    Lab Status: Final result Specimen: Blood from Arm, Left Updated: 05/01/21 1005     Sodium 139 mmol/L      Potassium 3 9 mmol/L      Chloride 104 mmol/L      CO2 25 mmol/L      ANION GAP 10 mmol/L      BUN 16 mg/dL      Creatinine 1 37 mg/dL      Glucose 122 mg/dL      Calcium 8 5 mg/dL      AST 13 U/L      ALT 15 U/L      Alkaline Phosphatase 61 U/L      Total Protein 6 8 g/dL      Albumin 3 9 g/dL      Total Bilirubin 1 05 mg/dL      eGFR 56 ml/min/1 73sq m     Narrative:      [facility] guidelines for Chronic Kidney Disease (CKD):     Stage 1 with normal or high GFR (GFR > 90 mL/min/1 73 square meters)    Stage 2 Mild CKD (GFR = 60-89 mL/min/1 73 square meters)    Stage 3A Moderate CKD (GFR = 45-59 mL/min/1 73 square meters)    Stage 3B Moderate CKD (GFR = 30-44 mL/min/1 73 square meters)    Stage 4 Severe CKD (GFR = 15-29 mL/min/1 73 square meters)    Stage 5 End Stage CKD (GFR <15 mL/min/1 73 square meters)  Note: GFR calculation is accurate only with a steady state creatinine    Lipase [[id number]]  (Normal) Collected: 05/01/21 0922    Lab Status: Final result Specimen: Blood from Arm, Left Updated: 05/01/21 1005     Lipase 102 u/L     Troponin I [[id number]]  (Normal) Collected: 05/01/21 0922    Lab Status: Final result Specimen: Blood from Arm, Left Updated: 05/01/21 0958     Troponin I <0 02 ng/mL     Lactic acid [[id number]]  (Normal) Collected: 05/01/21 0930 Lab Status: Final result Specimen: Blood from Arm, Left Updated: 05/01/21 0958     LACTIC ACID 0 8 mmol/L     Narrative:      Result may be elevated if tourniquet was used during collection  CBC and differential [[id number]]  (Abnormal) Collected: 05/01/21 0922    Lab Status: Final result Specimen: Blood from Arm, Left Updated: 05/01/21 0945     WBC 12 23 Thousand/uL      RBC 4 92 Million/uL      Hemoglobin 14 9 g/dL      Hematocrit 43 3 %      MCV 88 fL      MCH 30 3 pg      MCHC 34 4 g/dL      RDW 12 4 %      MPV 10 4 fL      Platelets 805 Thousands/uL      nRBC 0 /100 WBCs      Neutrophils Relative 83 %      Immat GRANS % 0 %      Lymphocytes Relative 7 %      Monocytes Relative 10 %      Eosinophils Relative 0 %      Basophils Relative 0 %      Neutrophils Absolute 10 08 Thousands/µL      Immature Grans Absolute 0 05 Thousand/uL      Lymphocytes Absolute 0 88 Thousands/µL      Monocytes Absolute 1 16 Thousand/µL      Eosinophils Absolute 0 03 Thousand/µL      Basophils Absolute 0 03 Thousands/µL     Blood culture #1 [[id number]] Collected: 05/01/21 4789    Lab Status: In process Specimen: Blood from Arm, Left Updated: 05/01/21 0936    Blood culture #2 [[id number]] Collected: 05/01/21 0922    Lab Status: In process Specimen: Blood from Arm, Left Updated: 05/01/21 0936                 CT abdomen pelvis with contrast   Final Result by Jeff He MD (05/01 1041)      Extensive inflammatory stranding associated with acute sigmoid diverticulitis  No pericolonic abscess or pneumoperitoneum  Cholelithiasis              Workstation performed: [id number]                    Procedures  ECG 12 Lead Documentation Only    Date/Time: 5/1/2021 10:18 AM  Performed by: Randolph Burgess PA-C  Authorized by: Leoncio Sanon DO     Indications / Diagnosis:  Abdominal Pain  Patient location:  ED  Rate:     ECG rate:  83    ECG rate assessment: normal    Rhythm:     Rhythm: sinus rhythm    ST segments:     ST segments: Normal  T waves:     T waves: non-specific    Comments:      QT/QTc 382/449             ED Course  ED Course as of May 01 1212   Sat May 01, 2021   1155 Patient is feeling improved after IV antibiotics and fluids  Patient would like to be discharged on oral antibiotics  Patient understands that he should follow up outpatient Gastroenterology  200 Educated patient regarding diagnosis and management  Advised patient to follow up with PCP  Advised patient to RTER for persistent or worsening symptoms  MDM  Number of Diagnoses or Management Options  Acute diverticulitis: new and requires workup  Cholelithiasis: new and requires workup  Glucosuria: new and requires workup  Diagnosis management comments: UA with ketones and glucose  Lab significant for elevated leukocytosis, mildly elevated creatinine and elevated total bilirubin  CT abdomen pelvis significant for Extensive inflammatory stranding associated with acute sigmoid diverticulitis  No pericolonic abscess or pneumoperitoneum  Cholelithiasis  Patient is feeling improved after IV fluids and antibiotics  Patient likely be discharged on oral antibiotics and will follow up outpatient with Gastroenterology  I provided patient with strict RTER precautions  I advised patient follow-up with PCP in 24-48 hours  Patient verbalized understanding            Amount and/or Complexity of Data Reviewed  Clinical lab tests: reviewed and ordered  Tests in the radiology section of CPT®: ordered and reviewed  Review and summarize past medical records: yes    Patient Progress  Patient progress: stable      Disposition  Final diagnoses:   Acute diverticulitis   Cholelithiasis   Glucosuria     Time reflects when diagnosis was documented in both MDM as applicable and the Disposition within this note     Time User Action Codes Description Comment    5/1/2021 11:11 AM Kim Gomes Add [K57 92] Acute diverticulitis     5/1/2021 11:11 AM Ashlyn Borrego Add [K80 20] Cholelithiasis     5/1/2021 11:27 AM Mamadou, [address]       ED Disposition     ED Disposition Condition Date/Time Comment    Discharge Stable Sat May 1, 2021 12:09 PM Manjeetjefe Sanches discharge to home/self care  Follow-up Information     Follow up With Specialties Details Why Contact Info Additional [address] Emergency Department Emergency Medicine Go to  If symptoms worsen [address] [facility] [address] [facility] Emergency Department, [address] [place], [place], [address] [place] [facility],[address], MD Family Medicine Schedule an appointment as soon as possible for a visit   [address] [facility] [address] [facility]  [phone]       Jocelin Mendosa MD Gastroenterology Schedule an appointment as soon as possible for a visit   [address] [place] [address] [address]  [phone]             Patient's Medications   Discharge Prescriptions    CIPROFLOXACIN (CIPRO) 500 MG TABLET    Take 1 tablet (500 mg total) by mouth every 12 (twelve) hours for 7 days       Start Date: 5/1/2021  End Date: 5/8/2021       Order Dose: 500 mg       Quantity: 14 tablet    Refills: 0    METRONIDAZOLE (FLAGYL) 500 MG TABLET    Take 1 tablet (500 mg total) by mouth every 8 (eight) hours for 7 days       Start Date: 5/1/2021  End Date: 5/8/2021       Order Dose: 500 mg       Quantity: 21 tablet    Refills: 0     No discharge procedures on file      PDMP Review     None          ED Provider  Electronically Signed by           Jarod Reaves PA-C  05/01/21 4532

## 2021-05-02 LAB
ATRIAL RATE: 86 BPM
P AXIS: 69 DEGREES
PR INTERVAL: 174 MS
QRS AXIS: -8 DEGREES
QRSD INTERVAL: 88 MS
QT INTERVAL: 382 MS
QTC INTERVAL: 449 MS
T WAVE AXIS: 26 DEGREES
VENTRICULAR RATE: 83 BPM

## 2021-05-02 PROCEDURE — 93010 ELECTROCARDIOGRAM REPORT: CPT | Performed by: INTERNAL MEDICINE

## 2021-05-06 LAB
BACTERIA BLD CULT: NORMAL
BACTERIA BLD CULT: NORMAL

## 2021-05-13 DIAGNOSIS — I10 ESSENTIAL HYPERTENSION: ICD-10-CM

## 2021-05-13 RX ORDER — METOPROLOL SUCCINATE 25 MG/1
TABLET, EXTENDED RELEASE ORAL
Qty: 45 TABLET | Refills: 3 | Status: SHIPPED | OUTPATIENT
Start: 2021-05-13 | End: 2021-07-08 | Stop reason: SDUPTHER

## 2021-05-26 DIAGNOSIS — I10 BENIGN ESSENTIAL HYPERTENSION: ICD-10-CM

## 2021-05-26 DIAGNOSIS — I48.0 PAF (PAROXYSMAL ATRIAL FIBRILLATION) (HCC): ICD-10-CM

## 2021-05-26 RX ORDER — LISINOPRIL 10 MG/1
TABLET ORAL
Qty: 30 TABLET | Refills: 0 | Status: SHIPPED | OUTPATIENT
Start: 2021-05-26 | End: 2021-06-30

## 2021-05-26 RX ORDER — APIXABAN 5 MG/1
TABLET, FILM COATED ORAL
Qty: 60 TABLET | Refills: 0 | Status: SHIPPED | OUTPATIENT
Start: 2021-05-26 | End: 2021-06-28

## 2021-06-11 ENCOUNTER — OFFICE VISIT (OUTPATIENT)
Dept: GASTROENTEROLOGY | Facility: AMBULARY SURGERY CENTER | Age: 61
End: 2021-06-11
Payer: COMMERCIAL

## 2021-06-11 VITALS
HEIGHT: 72 IN | WEIGHT: 172 LBS | SYSTOLIC BLOOD PRESSURE: 130 MMHG | BODY MASS INDEX: 23.3 KG/M2 | DIASTOLIC BLOOD PRESSURE: 80 MMHG

## 2021-06-11 DIAGNOSIS — Z87.19 HISTORY OF DIVERTICULITIS: ICD-10-CM

## 2021-06-11 DIAGNOSIS — R13.19 ESOPHAGEAL DYSPHAGIA: Primary | ICD-10-CM

## 2021-06-11 PROCEDURE — 3079F DIAST BP 80-89 MM HG: CPT | Performed by: PHYSICIAN ASSISTANT

## 2021-06-11 PROCEDURE — 3008F BODY MASS INDEX DOCD: CPT | Performed by: PHYSICIAN ASSISTANT

## 2021-06-11 PROCEDURE — 3075F SYST BP GE 130 - 139MM HG: CPT | Performed by: PHYSICIAN ASSISTANT

## 2021-06-11 PROCEDURE — 1036F TOBACCO NON-USER: CPT | Performed by: PHYSICIAN ASSISTANT

## 2021-06-11 PROCEDURE — 99203 OFFICE O/P NEW LOW 30 MIN: CPT | Performed by: PHYSICIAN ASSISTANT

## 2021-06-11 RX ORDER — FAMOTIDINE 40 MG/1
20 TABLET, FILM COATED ORAL DAILY
Qty: 30 TABLET | Refills: 5 | Status: SHIPPED | OUTPATIENT
Start: 2021-06-11

## 2021-06-11 NOTE — PROGRESS NOTES
[facility] - Outpatient Consultation  Gauri Conti 62 y o  male MRN: [id number]  Encounter: [id number]          ASSESSMENT AND PLAN:      #1  Esophageal dysphagia:  Suspect possibly due to underlying reflux  Reports that he has been having intermittent issues with swallowing solids for the last year, he did have 1 episode with trouble with liquids as well  This has not been progressive and is not daily  Rule out peptic stricture, eosinophilic esophagitis  -will start Pepcid 20 mg daily, consider increasing to 40 mg daily if patient continues to have symptoms  -advised patient to call us if he is having any worsening reflux symptoms or if his dysphagia is progressively worsening and becoming more frequent  -will plan for upper endoscopy at the same time as colonoscopy    #2  History of diverticulitis: Patient recently was in the emergency room and had diverticulitis diagnosed on CT scan at the beginning of May  Patient is currently asymptomatic  This was his 1st episode of diverticulitis  He has a history of diverticulosis and his last colonoscopy was in 2017  He does report a 30 lb weight loss over the last 15 months however he was working from home and not eating as many sweets as normal   -will plan for colonoscopy to further assess and rule out any underlying malignancy  -patient will do MiraLax Dulcolax prep over-the-counter  -patient advised to call us with any worsening symptoms or to call our office right away if he has any recurrence of left lower quadrant pain similar to his previous pain   -discussed high-fiber diet  Patient is taking a fiber supplement    Would recommend he continue this   -discussed diverticulosis and diverticulitis in detail with the patient    ______________________________________________________________________    HPI:  This is a 27-year-old male with a history of hypertension, history of stroke, atrial fibrillation on Eliquis, mitral valve prolapse, diverticulosis, sarcoidosis, and CKD who presents as a new patient visit due to diverticulitis  Patient was last seen by us in 2017 at which time he had a colonoscopy which was normal aside from diverticulosis  Patient was recently in the emergency room at the beginning of May secondary to left lower quadrant abdominal pain and was diagnosed with diverticulitis and treated with a course of antibiotics  Patient reports that since that time his symptoms have resolved  He reports his bowel movements are regular  They are soft but formed and denies any diarrhea or constipation  Denies any blood in the stool or black tarry stools  He reports that his diet is not necessarily the healthiest   He reports that over the last 15 months he was working from home and was eating less sweets and lost about 30 lb  He also reports that over the last year he has noticed intermittent issues with swallowing foods  He feels like food gets stuck on the way down  This will happen about once a month  He reports that 1 point during Easter he had an episode with difficulty swallowing liquids as well and ended up vomiting  Otherwise he denies any issues in between these episodes  Denies any significant reflux symptoms  Denies any persistent nausea or vomiting  He has never had an endoscopy in the past       REVIEW OF SYSTEMS:    CONSTITUTIONAL: Denies any fever, chills, rigors, +weight loss  HEENT: No earache or tinnitus  Denies hearing loss or visual disturbances  CARDIOVASCULAR: No chest pain or palpitations  RESPIRATORY: Denies any cough, hemoptysis, shortness of breath or dyspnea on exertion  GASTROINTESTINAL: As noted in the History of Present Illness  GENITOURINARY: No problems with urination  Denies any hematuria or dysuria  NEUROLOGIC: No dizziness or vertigo, denies headaches  MUSCULOSKELETAL: Denies any muscle or joint pain  SKIN: Denies skin rashes or itching     ENDOCRINE: Denies excessive thirst  Denies intolerance to heat or cold  PSYCHOSOCIAL: Denies depression or anxiety  Denies any recent memory loss  Historical Information   Past Medical History:   Diagnosis Date    Allergic rhinitis     Aortic root dilatation (HCC)     Asthma     Benign essential hypertension     Cervical lymphadenopathy     CKD (chronic kidney disease)     [facility]     Diverticulitis of colon     Elevated blood pressure reading     last assessed 01/08/14    Erectile dysfunction of non-organic origin     Fluttering heart     Hypercalcemia     last assessed 05/19/16    Hypertension     [facility] )     Mitral valve prolapse     Moderate mitral regurgitation     [facility]     Palpitations     last assessed 03/29/13    Renal insufficiency     last assessed 09/23/13    Sarcoidosis     Sarcoidosis     TIA (transient ischemic attack)      Past Surgical History:   Procedure Laterality Date    CARDIAC CATHETERIZATION      CATARACT EXTRACTION      COLONOSCOPY      EYE SURGERY      INGUINAL HERNIA REPAIR      NASAL SEPTUM SURGERY  1984    Septal Deviation Repair    NE COLONOSCOPY FLX DX W/COLLJ SPEC WHEN PFRMD N/A 6/1/2017    Procedure: COLONOSCOPY;  Surgeon: Julia Johnson MD;  [facility];   Service: Gastroenterology    TONSILLECTOMY  1982    TOOTH EXTRACTION       Social History   Social History     Substance and Sexual Activity   Alcohol Use Yes    Comment: rare     Social History     Substance and Sexual Activity   Drug Use No     Social History     Tobacco Use   Smoking Status Never Smoker   Smokeless Tobacco Never Used     Family History   Problem Relation Age of Onset    Heart failure Mother     Diabetes Mother     Hypertension Mother     Cerebral aneurysm Father     Diabetes Sister     Heart attack Neg Hx     Stroke Neg Hx     Clotting disorder Neg Hx     Hyperlipidemia Neg Hx        Meds/Allergies Current Outpatient Medications:     Ascorbic Acid (VITAMIN C) 1000 MG tablet    BIOTIN PO    dofetilide (TIKOSYN) 500 mcg capsule    Eliquis 5 MG    Ferrous Sulfate (IRON) 325 (65 Fe) MG TABS    fluticasone (FLONASE) 50 mcg/act nasal spray    Glucosamine HCl (GLUCOSAMINE PO)    lisinopril (ZESTRIL) 10 mg tablet    metoprolol succinate (TOPROL-XL) 25 mg 24 hr tablet    Multiple Vitamins-Minerals (PRESERVISION AREDS 2+MULTI VIT PO)    sildenafil (REVATIO) 20 mg tablet    VITAMIN B COMPLEX-C PO    vitamin E, tocopherol, 400 units capsule    famotidine (PEPCID) 40 MG tablet    No Known Allergies        Objective     Blood pressure 130/80, height 6' (1 829 m), weight 78 kg (172 lb)  Body mass index is 23 33 kg/m²  PHYSICAL EXAM:      General Appearance:   Alert, cooperative, no distress   HEENT:   Normocephalic, atraumatic, anicteric      Neck:  Supple, symmetrical, trachea midline   Lungs:   Clear to auscultation bilaterally; no rales, rhonchi or wheezing; respirations unlabored    Heart[de-identified]   Regular rate and rhythm; no murmur, rub, or gallop  Abdomen:   Soft, non-tender, non-distended; normal bowel sounds; no masses, no organomegaly    Genitalia:   Deferred    Rectal:   Deferred    Extremities:  No cyanosis, clubbing or edema    Pulses:  2+ and symmetric    Skin:  No jaundice, rashes, or lesions    Lymph nodes:  No palpable cervical lymphadenopathy        Lab Results:   No visits with results within 1 Day(s) from this visit     Latest known visit with results is:   Admission on 05/01/2021, Discharged on 05/01/2021   Component Date Value    WBC 05/01/2021 12 23*    RBC 05/01/2021 4 92     Hemoglobin 05/01/2021 14 9     Hematocrit 05/01/2021 43 3     MCV 05/01/2021 88     MCH 05/01/2021 30 3     MCHC 05/01/2021 34 4     RDW 05/01/2021 12 4     MPV 05/01/2021 10 4     Platelets 27/65/4837 199     nRBC 05/01/2021 0     Neutrophils Relative 05/01/2021 83*    Immat GRANS % 05/01/2021 0  Lymphocytes Relative 05/01/2021 7*    Monocytes Relative 05/01/2021 10     Eosinophils Relative 05/01/2021 0     Basophils Relative 05/01/2021 0     Neutrophils Absolute 05/01/2021 10 08*    Immature Grans Absolute 05/01/2021 0 05     Lymphocytes Absolute 05/01/2021 0 88     Monocytes Absolute 05/01/2021 1 16     Eosinophils Absolute 05/01/2021 0 03     Basophils Absolute 05/01/2021 0 03     Sodium 05/01/2021 139     Potassium 05/01/2021 3 9     Chloride 05/01/2021 104     CO2 05/01/2021 25     ANION GAP 05/01/2021 10     BUN 05/01/2021 16     Creatinine 05/01/2021 1 37*    Glucose 05/01/2021 122     Calcium 05/01/2021 8 5     AST 05/01/2021 13     ALT 05/01/2021 15     Alkaline Phosphatase 05/01/2021 61     Total Protein 05/01/2021 6 8     Albumin 05/01/2021 3 9     Total Bilirubin 05/01/2021 1 05*    eGFR 05/01/2021 56     Lipase 05/01/2021 102     Blood Culture 05/01/2021 No Growth After 5 Days   Blood Culture 05/01/2021 No Growth After 5 Days   Color, UA 05/01/2021 Light Yellow     Clarity, UA 05/01/2021 Clear     Specific Gravity, UA 05/01/2021 1 025     pH, UA 05/01/2021 5 5     Leukocytes, UA 05/01/2021 Negative     Nitrite, UA 05/01/2021 Negative     Protein, UA 05/01/2021 Negative     Glucose, UA 05/01/2021 100 (1/10%)*    Ketones, UA 05/01/2021 40 (2+)*    Urobilinogen, UA 05/01/2021 0 2     Bilirubin, UA 05/01/2021 Negative     Blood, UA 05/01/2021 Negative     Troponin I 05/01/2021 <0 02     LACTIC ACID 05/01/2021 0 8     Ventricular Rate 05/01/2021 83     Atrial Rate 05/01/2021 86     NJ Interval 05/01/2021 174     QRSD Interval 05/01/2021 88     QT Interval 05/01/2021 382     QTC Interval 05/01/2021 449     P Axis 05/01/2021 69     QRS Axis 05/01/2021 -8     T Wave Axis 05/01/2021 26          Radiology Results:   No results found

## 2021-06-17 ENCOUNTER — TELEPHONE (OUTPATIENT)
Dept: GASTROENTEROLOGY | Facility: AMBULARY SURGERY CENTER | Age: 61
End: 2021-06-17

## 2021-06-17 ENCOUNTER — TELEPHONE (OUTPATIENT)
Dept: CARDIOLOGY CLINIC | Facility: CLINIC | Age: 61
End: 2021-06-17

## 2021-06-17 NOTE — TELEPHONE ENCOUNTER
Pt scheduled for Colon/ EGD on 9/2/21  Dr Michael Patricio would like a hold of Eliquis 2 days prior to procedure        Please advise

## 2021-06-17 NOTE — TELEPHONE ENCOUNTER
Our mutual patient is scheduled for procedure: COLONOSCOPY/EGD    On: _9___/_ 2   _/_  2021  _      With:   ___NICHOLAS______    He/She is taking the following blood thinner:   ELIQUIS       Can this be stopped __2____ days prior to the procedure?       Physician Approving clearance: ________________________

## 2021-06-17 NOTE — TELEPHONE ENCOUNTER
Patient is scheduled for colonoscopy and EGD on September 2 , 2021 at [facility]  with Driss Chapman MD  Patient is aware of pre-procedure prep of Miralax/Dulcolax and they will be called the day prior between 2 and 6 pm for time to report for procedure

## 2021-06-24 DIAGNOSIS — I48.0 PAF (PAROXYSMAL ATRIAL FIBRILLATION) (HCC): ICD-10-CM

## 2021-06-28 RX ORDER — APIXABAN 5 MG/1
TABLET, FILM COATED ORAL
Qty: 60 TABLET | Refills: 0 | Status: SHIPPED | OUTPATIENT
Start: 2021-06-28 | End: 2021-07-08 | Stop reason: SDUPTHER

## 2021-06-29 DIAGNOSIS — I10 BENIGN ESSENTIAL HYPERTENSION: ICD-10-CM

## 2021-06-30 RX ORDER — LISINOPRIL 10 MG/1
TABLET ORAL
Qty: 30 TABLET | Refills: 0 | Status: SHIPPED | OUTPATIENT
Start: 2021-06-30 | End: 2021-07-08 | Stop reason: SDUPTHER

## 2021-07-06 ENCOUNTER — TELEPHONE (OUTPATIENT)
Dept: GASTROENTEROLOGY | Facility: AMBULARY SURGERY CENTER | Age: 61
End: 2021-07-06

## 2021-07-08 ENCOUNTER — OFFICE VISIT (OUTPATIENT)
Dept: CARDIOLOGY CLINIC | Facility: CLINIC | Age: 61
End: 2021-07-08
Payer: COMMERCIAL

## 2021-07-08 VITALS
WEIGHT: 174.8 LBS | OXYGEN SATURATION: 99 % | DIASTOLIC BLOOD PRESSURE: 74 MMHG | HEART RATE: 68 BPM | BODY MASS INDEX: 23.68 KG/M2 | HEIGHT: 72 IN | SYSTOLIC BLOOD PRESSURE: 120 MMHG

## 2021-07-08 DIAGNOSIS — I77.810 AORTIC ROOT DILATATION (HCC): ICD-10-CM

## 2021-07-08 DIAGNOSIS — I48.0 PAF (PAROXYSMAL ATRIAL FIBRILLATION) (HCC): Primary | ICD-10-CM

## 2021-07-08 DIAGNOSIS — I34.1 MITRAL VALVE PROLAPSE: ICD-10-CM

## 2021-07-08 DIAGNOSIS — I10 BENIGN ESSENTIAL HYPERTENSION: ICD-10-CM

## 2021-07-08 DIAGNOSIS — G47.33 OBSTRUCTIVE SLEEP APNEA: ICD-10-CM

## 2021-07-08 DIAGNOSIS — I48.91 ATRIAL FIBRILLATION, UNSPECIFIED TYPE (HCC): ICD-10-CM

## 2021-07-08 DIAGNOSIS — I10 ESSENTIAL HYPERTENSION: ICD-10-CM

## 2021-07-08 DIAGNOSIS — I34.0 MODERATE MITRAL REGURGITATION: ICD-10-CM

## 2021-07-08 PROCEDURE — 3074F SYST BP LT 130 MM HG: CPT | Performed by: INTERNAL MEDICINE

## 2021-07-08 PROCEDURE — 99214 OFFICE O/P EST MOD 30 MIN: CPT | Performed by: INTERNAL MEDICINE

## 2021-07-08 PROCEDURE — 93000 ELECTROCARDIOGRAM COMPLETE: CPT | Performed by: INTERNAL MEDICINE

## 2021-07-08 PROCEDURE — 3008F BODY MASS INDEX DOCD: CPT | Performed by: INTERNAL MEDICINE

## 2021-07-08 PROCEDURE — 1036F TOBACCO NON-USER: CPT | Performed by: INTERNAL MEDICINE

## 2021-07-08 PROCEDURE — 3078F DIAST BP <80 MM HG: CPT | Performed by: INTERNAL MEDICINE

## 2021-07-08 RX ORDER — METOPROLOL SUCCINATE 25 MG/1
25 TABLET, EXTENDED RELEASE ORAL DAILY
Qty: 45 TABLET | Refills: 3 | Status: SHIPPED | OUTPATIENT
Start: 2021-07-08 | End: 2022-06-30

## 2021-07-08 RX ORDER — DOFETILIDE 0.5 MG/1
500 CAPSULE ORAL EVERY 12 HOURS SCHEDULED
Qty: 60 CAPSULE | Refills: 11 | Status: SHIPPED | OUTPATIENT
Start: 2021-07-08 | End: 2022-07-21

## 2021-07-08 RX ORDER — LISINOPRIL 10 MG/1
10 TABLET ORAL DAILY
Qty: 30 TABLET | Refills: 11 | Status: SHIPPED | OUTPATIENT
Start: 2021-07-08 | End: 2022-07-29 | Stop reason: SDUPTHER

## 2021-07-08 NOTE — PROGRESS NOTES
Follow-up - Cardiology   Marimar Cason 62 y o  male MRN: [id number]        Problems    1  PAF (paroxysmal atrial fibrillation) (HCC)  POCT ECG   2  Mitral valve prolapse     3  Moderate mitral regurgitation     4  Aortic root dilatation (HCC)     5  Benign essential hypertension     6  Obstructive sleep apnea         Impression:    Ron Burt follows up with me at the [facility] office  Atrial fibrillation  · Paroxysmal, on rhythm control with Tikosyn, and low-dose metoprolol  · Secondary prophylaxis with Eliquis owing to prior lacunar stroke  · QT is normal, in sinus bradycardia today  · No bleeding on Eliquis    Aortic root enlargement  · 42 mm and unchanged as of 1/20 echocardiogram   · Due for repeat echo 1/22    Hypertension  · Avoid HCTZ due to administration of Tikosyn  · Well controlled on lisinopril and metoprolol    Mitral regurgitation  · Originally felt to be moderate with prolapse  · Follow-up study 1/20 revealed only mild mitral regurgitation with bowing of the mitral valve  · He has no symptoms of shortness of breath  · No murmurs on examination today    Plan:    · Six-month follow-up  · Repeat ECHO for assessment of mitral regurgitation/aortic root enlargement in 6 months    HPI:   Marimar Cason is a 62y o  year old male with a history of embolic CVA who, paroxysmal atrial fibrillation, mitral valve prolapse, mitral valve regurgitation, hypertension, history of sarcoidosis, AMY, and 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 presents for a f/u    He has had no recurrence of AFib, continues metoprolol and Tikosyn, QTC is normal, he is on Eliquis with out significant bleeding  No stroke-like symptoms    Blood pressure is well controlled on lisinopril and metoprolol  He continues to be very physically active, recently taking a 7-1/2 mi walk [place]  Lipids are well controlled  Dilated aortic root last assessed 1/20, unchanged from 2018  Mitral valve disease was last assessed 1/20, he has no symptoms of shortness of breath, his regurgitation was felt to be mild at that time with bowing of the valve, previously felt to have prolapse with moderate MR, but this is not been the case lately  He remains compliant with CPAP  His loop recorder was extracted 4/21 due to being end of life    Review of Systems   Constitutional: Negative for appetite change, diaphoresis, fatigue and fever  Respiratory: Negative for chest tightness, shortness of breath and wheezing  Cardiovascular: Negative for chest pain, palpitations and leg swelling  Gastrointestinal: Negative for abdominal pain and blood in stool  Musculoskeletal: Negative for arthralgias and joint swelling  Skin: Negative for rash  Neurological: Negative for dizziness, syncope and light-headedness           Past Medical History:   Diagnosis Date    Allergic rhinitis     [facility]     Asthma     Benign essential hypertension     Cervical lymphadenopathy     CKD (chronic kidney disease)     [facility]     Diverticulitis of colon     Elevated blood pressure reading     last assessed 01/08/14    Erectile dysfunction of non-organic origin     Fluttering heart     Hypercalcemia     last assessed 05/19/16    Hypertension     [facility]     Mitral valve prolapse     Moderate mitral regurgitation     PA[facility]ox[facility]mal atrial fibrillation[facility]     Palpitations     last assessed 03/29/13    Renal insufficiency     last assessed 09/23/13    Sarcoidosis     Sarcoidosis     TIA (transient ischemic attack)      Social History     Substance and Sexual Activity   Alcohol Use Not Currently    Comment: rare     Social History     Substance and Sexual Activity   Drug Use No     Social History     Tobacco Use   Smoking Status Never Smoker   Smokeless Tobacco Never Used       Allergies:  No Known Allergies    Medications:     Current Outpatient Medications:     Ascorbic Acid (VITAMIN C) 1000 MG tablet, Take 1,000 Doses by mouth, Disp: , Rfl:     BIOTIN PO, Take 1 capsule by mouth daily, Disp: , Rfl:     dofetilide (TIKOSYN) 500 mcg capsule, Take 1 capsule (500 mcg total) by mouth every 12 (twelve) hours, Disp: 60 capsule, Rfl: 11    Eliquis 5 MG, TAKE ONE TABLET BY MOUTH 2 TIMES A DAY, Disp: 60 tablet, Rfl: 0    famotidine (PEPCID) 40 MG tablet, Take 0 5 tablets (20 mg total) by mouth daily, Disp: 30 tablet, Rfl: 5    Ferrous Sulfate (IRON) 325 (65 Fe) MG TABS, Take 1 Dose by mouth 3 (three) times a week QOD, Disp: , Rfl:     fluticasone (FLONASE) 50 mcg/act nasal spray, 50 sprays into each nostril daily, Disp: , Rfl:     Glucosamine HCl (GLUCOSAMINE PO), Take 1 capsule by mouth, Disp: , Rfl:     lisinopril (ZESTRIL) 10 mg tablet, TAKE ONE TABLET BY MOUTH ONCE A DAY, Disp: 30 tablet, Rfl: 0    metoprolol succinate (TOPROL-XL) 25 mg 24 hr tablet, TAKE 1/2 TABLET BY MOUTH ONCE A DAY, Disp: 45 tablet, Rfl: 3    Multiple Vitamins-Minerals (PRESERVISION AREDS 2+MULTI VIT PO), Take by mouth, Disp: , Rfl:     sildenafil (REVATIO) 20 mg tablet, Take 3-5 tabelts 1 hour prior to intercourse , Disp: 30 tablet, Rfl: 1    VITAMIN B COMPLEX-C PO, Take 1 Dose by mouth, Disp: , Rfl:     vitamin E, tocopherol, 400 units capsule, Take 1 Dose by mouth daily, Disp: , Rfl:       Vitals:    07/08/21 0747   BP: 120/74   Pulse: 68   SpO2: 99%     Weight (last 2 days)     Date/Time   Weight    07/08/21 0747   79 3 (174 8)            Physical Exam  Constitutional:       General: He is not in acute distress  Appearance: He is not diaphoretic  HENT:      Head: Normocephalic and atraumatic  Eyes:      General: No scleral icterus  Conjunctiva/sclera: Conjunctivae normal    Neck:      Vascular: No JVD  Cardiovascular:      Rate and Rhythm: Normal rate and regular rhythm  Heart sounds: Normal heart sounds  No murmur heard  Pulmonary:      Effort: Pulmonary effort is normal  No respiratory distress        Breath sounds: Normal breath sounds  No decreased breath sounds, wheezing, rhonchi or rales  Musculoskeletal:      Cervical back: Normal range of motion  Right lower leg: Normal  No edema  Left lower leg: Normal  No edema  Skin:     General: Skin is warm and dry  Neurological:      Mental Status: He is alert and oriented to person, place, and time  Laboratory Studies:  Lab Results   Component Value Date    HGBA1C 5 3 05/15/2019    HGBA1C 5 3 06/30/2017    HGBA1C 5 2 02/20/2017     11/11/2015     06/27/2015     11/10/2014    K 3 9 05/01/2021    K 4 1 02/08/2021    K 4 5 09/17/2020    K 4 0 11/11/2015    K 4 1 06/27/2015    K 4 0 11/10/2014     05/01/2021     02/08/2021     09/17/2020     11/11/2015     06/27/2015     11/10/2014    CO2 25 05/01/2021    CO2 28 02/08/2021    CO2 26 09/17/2020    CO2 28 11/11/2015    CO2 28 06/27/2015    CO2 28 11/10/2014    GLUCOSE 81 11/11/2015    GLUCOSE 93 06/27/2015    GLUCOSE 93 11/10/2014    CREATININE 1 37 (H) 05/01/2021    CREATININE 1 25 02/08/2021    CREATININE 1 31 (H) 09/17/2020    CREATININE 1 26 11/11/2015    CREATININE 1 17 06/27/2015    CREATININE 1 13 11/10/2014    BUN 16 05/01/2021    BUN 19 02/08/2021    BUN 17 09/17/2020    BUN 19 11/11/2015    BUN 19 06/27/2015    BUN 15 11/10/2014    MG 1 9 09/08/2017    MG 2 2 07/06/2017    MG 2 1 07/05/2017     Lab Results   Component Value Date    WBC 12 23 (H) 05/01/2021    WBC 6 11 11/11/2015    RBC 4 92 05/01/2021    RBC 4 88 11/11/2015    HGB 14 9 05/01/2021    HGB 14 0 11/11/2015    HCT 43 3 05/01/2021    HCT 41 7 11/11/2015    MCV 88 05/01/2021    MCV 86 11/11/2015    MCH 30 3 05/01/2021    MCH 28 7 11/11/2015    RDW 12 4 05/01/2021    RDW 14 4 11/11/2015     05/01/2021     11/11/2015     NT-proBNP: No results for input(s): NTBNP in the last 72 hours     Coags:    Lipid Profile:   Lab Results   Component Value Date    CHOL 153 06/27/2015     Lab Results   Component Value Date    HDL 38 (L) 05/13/2020     Lab Results   Component Value Date    LDLCALC 111 (H) 05/13/2020     Lab Results   Component Value Date    TRIG 91 05/13/2020       Cardiac testing:   EKG reviewed personally:    2019-Normal sinus rhythm,  milliseconds  12/20-normal sinus rhythm,  milliseconds  Results for orders placed or performed in visit on 07/08/21   POCT ECG    Impression    Sinus rhythm, normal EKG         Loop recorder interrogation 6/4/2019 - 2 8% AFib burden, 2 episodes of AFib with RVR  Loop recorder interrogation 8/9/2019-0 1% AFib burden  Loop recorder interrogation 11/20-no AFib  Loop recorder extracted 4/21    Echocardiogram   2/18-LVEF 60%, grade 1 DD, trace MR with bowing of the anterior and posterior leaflets, very mild aortic root enlargement at 42 mm  1/20-EF normal, grade 1 diastolic dysfunction, mild-to-moderate MR, aortic root unchanged at 42 mm    Cardiac catheterization   5/17 - normal coronaries    Edilia Contreras MD    Portions of the record may have been created with voice recognition software   Occasional wrong word or "sound a like" substitutions may have occurred due to the inherent limitations of voice recognition software   Read the chart carefully and recognize, using context, where substitutions have occurred

## 2021-09-01 ENCOUNTER — TELEPHONE (OUTPATIENT)
Dept: SURGERY | Facility: AMBULARY SURGERY CENTER | Age: 61
End: 2021-09-01

## 2021-09-02 ENCOUNTER — ANESTHESIA EVENT (OUTPATIENT)
Dept: GASTROENTEROLOGY | Facility: AMBULARY SURGERY CENTER | Age: 61
End: 2021-09-02

## 2021-09-02 ENCOUNTER — HOSPITAL ENCOUNTER (OUTPATIENT)
Dept: GASTROENTEROLOGY | Facility: AMBULARY SURGERY CENTER | Age: 61
Setting detail: OUTPATIENT SURGERY
Discharge: HOME | End: 2021-09-02
Attending: INTERNAL MEDICINE | Admitting: INTERNAL MEDICINE
Payer: COMMERCIAL

## 2021-09-02 ENCOUNTER — ANESTHESIA (OUTPATIENT)
Dept: GASTROENTEROLOGY | Facility: AMBULARY SURGERY CENTER | Age: 61
End: 2021-09-02

## 2021-09-02 VITALS
DIASTOLIC BLOOD PRESSURE: 59 MMHG | TEMPERATURE: 97.1 F | RESPIRATION RATE: 18 BRPM | HEIGHT: 72 IN | HEART RATE: 81 BPM | BODY MASS INDEX: 23.84 KG/M2 | SYSTOLIC BLOOD PRESSURE: 110 MMHG | OXYGEN SATURATION: 100 % | WEIGHT: 176 LBS

## 2021-09-02 DIAGNOSIS — Z87.19 HISTORY OF DIVERTICULITIS: ICD-10-CM

## 2021-09-02 DIAGNOSIS — R13.19 ESOPHAGEAL DYSPHAGIA: ICD-10-CM

## 2021-09-02 PROCEDURE — 88342 IMHCHEM/IMCYTCHM 1ST ANTB: CPT | Performed by: PATHOLOGY

## 2021-09-02 PROCEDURE — 45380 COLONOSCOPY AND BIOPSY: CPT | Performed by: INTERNAL MEDICINE

## 2021-09-02 PROCEDURE — 45385 COLONOSCOPY W/LESION REMOVAL: CPT | Performed by: INTERNAL MEDICINE

## 2021-09-02 PROCEDURE — 43239 EGD BIOPSY SINGLE/MULTIPLE: CPT | Performed by: INTERNAL MEDICINE

## 2021-09-02 PROCEDURE — 88305 TISSUE EXAM BY PATHOLOGIST: CPT | Performed by: PATHOLOGY

## 2021-09-02 RX ORDER — PROPOFOL 10 MG/ML
INJECTION, EMULSION INTRAVENOUS CONTINUOUS PRN
Status: DISCONTINUED | OUTPATIENT
Start: 2021-09-02 | End: 2021-09-02

## 2021-09-02 RX ORDER — PROPOFOL 10 MG/ML
INJECTION, EMULSION INTRAVENOUS AS NEEDED
Status: DISCONTINUED | OUTPATIENT
Start: 2021-09-02 | End: 2021-09-02

## 2021-09-02 RX ORDER — SODIUM CHLORIDE, SODIUM LACTATE, POTASSIUM CHLORIDE, CALCIUM CHLORIDE 600; 310; 30; 20 MG/100ML; MG/100ML; MG/100ML; MG/100ML
INJECTION, SOLUTION INTRAVENOUS CONTINUOUS PRN
Status: DISCONTINUED | OUTPATIENT
Start: 2021-09-02 | End: 2021-09-02

## 2021-09-02 RX ADMIN — PROPOFOL 80 MG: 10 INJECTION, EMULSION INTRAVENOUS at 11:07

## 2021-09-02 RX ADMIN — SODIUM CHLORIDE, SODIUM LACTATE, POTASSIUM CHLORIDE, AND CALCIUM CHLORIDE: .6; .31; .03; .02 INJECTION, SOLUTION INTRAVENOUS at 11:34

## 2021-09-02 RX ADMIN — SODIUM CHLORIDE, SODIUM LACTATE, POTASSIUM CHLORIDE, AND CALCIUM CHLORIDE: .6; .31; .03; .02 INJECTION, SOLUTION INTRAVENOUS at 10:30

## 2021-09-02 RX ADMIN — PROPOFOL 50 MG: 10 INJECTION, EMULSION INTRAVENOUS at 11:17

## 2021-09-02 RX ADMIN — PROPOFOL 30 MG: 10 INJECTION, EMULSION INTRAVENOUS at 11:29

## 2021-09-02 RX ADMIN — PROPOFOL 100 MCG/KG/MIN: 10 INJECTION, EMULSION INTRAVENOUS at 11:07

## 2021-09-02 RX ADMIN — PROPOFOL 50 MG: 10 INJECTION, EMULSION INTRAVENOUS at 11:13

## 2021-09-02 NOTE — ANESTHESIA POSTPROCEDURE EVALUATION
Post-Op Assessment Note    CV Status:  Stable  Pain Score: 0    Pain management: adequate     Mental Status:  Alert and awake   Hydration Status:  Euvolemic   PONV Controlled:  Controlled   Airway Patency:  Patent      Post Op Vitals Reviewed: Yes      Staff: CRNA         No complications documented      BP 91/54 (09/02/21 1138)    Temp     Pulse 79 (09/02/21 1138)   Resp 15 (09/02/21 1138)    SpO2 97 % (09/02/21 1138)

## 2021-09-02 NOTE — ANESTHESIA PREPROCEDURE EVALUATION
Procedure:  COLONOSCOPY  EGD    Relevant Problems   CARDIO   (+) Aortic root dilatation (HCC)   (+) Benign essential hypertension   (+) Mitral valve prolapse   (+) Moderate mitral regurgitation   (+) PAF (paroxysmal atrial fibrillation) (HCC)      /RENAL   (+) CKD (chronic kidney disease)      NEURO/PSYCH   (+) Cerebrovascular accident (CVA) ([address] )      PULMONARY   (+) Obstructive sleep apnea        Physical Exam    Airway    Mallampati score: II  TM Distance: >3 FB  Neck ROM: full     Dental       Cardiovascular  Rhythm: regular,     Pulmonary  Breath sounds clear to auscultation,     Other Findings        Anesthesia Plan  ASA Score- 3     Anesthesia Type-     Plan Factors-Exercise tolerance (METS): >4 METS  EKG reviewed  Existing labs reviewed  Patient summary reviewed  Patient is not a current smoker  Induction-     Postoperative Plan-     Informed Consent-   I personally reviewed this patient with the CRNA  Discussed and agreed on the Anesthesia Plan with the CRNA  Ramon Knox other (specify)/RN, EMR

## 2021-09-02 NOTE — H&P
History and Physical - [facility]  Bhavani Henderson 62 y o  male MRN: [id number]    HPI: Bhavani Henderson is a 62y o  year old male who presents for evaluation of GERD with dysphagia and recent episode of diverticulitis  Review of Systems    Historical Information   Past Medical History:   Diagnosis Date    Allergic rhinitis     Aortic root dilatation (HCC)     Asthma     Benign essential hypertension     Cervical lymphadenopathy     CKD (chronic kidney disease)     [facility]     Diverticulitis of colon     Elevated blood pressure reading     last assessed 01/08/14    Erectile dysfunction of non-organic origin     Fluttering heart     Hypercalcemia     last assessed 05/19/16    Hypertension     [facility]     Mitral valve prolapse     Moderate mitral regurgitation     PAF [facility]paroxysmal atrial fibrillation) ([facility])     Palpitations     last assessed 03/29/13    Renal insufficiency     last assessed 09/23/13    Sarcoidosis     Sarcoidosis     TIA (transient ischemic attack)      Past Surgical History:   Procedure Laterality Date    CARDIAC CATHETERIZATION      CATARACT EXTRACTION      COLONOSCOPY      EYE SURGERY      INGUINAL HERNIA REPAIR      NASAL SEPTUM SURGERY  1984    Septal Deviation Repair    GA COLONOSCOPY FLX DX W/COLLJ SPEC WHEN PFRMD N/A 6/1/2017    Procedure: COLONOSCOPY;  Surgeon: Dominga Huerta MD;  [facility];   Service: Gastroenterology    TONSILLECTOMY  1982    TOOTH EXTRACTION       Social History   Social History     Substance and Sexual Activity   Alcohol Use Not Currently    Comment: rare     Social History     Substance and Sexual Activity   Drug Use No     Social History     Tobacco Use   Smoking Status Never Smoker   Smokeless Tobacco Never Used     Family History   Problem Relation Age of Onset    Heart failure Mother     Diabetes Mother     Hypertension Mother     Cerebral aneurysm Father     Diabetes Sister     Heart attack Neg Hx     Stroke Neg Hx     Clotting disorder Neg Hx     Hyperlipidemia Neg Hx        Meds/Allergies     (Not in a hospital admission)      No Known Allergies    Objective     /75   Pulse 94   Temp (!) 97 2 °F (36 2 °C) (Temporal)   Resp 18   Ht 6' (1 829 m)   Wt 79 8 kg (176 lb)   SpO2 99%   BMI 23 87 kg/m²       PHYSICAL EXAM    Gen: NAD  CV: RRR  CHEST: Clear  ABD: soft, NT/ND  EXT: no edema  Neuro: AAO      ASSESSMENT/PLAN:  This is a 62y o  year old male here for evaluation of diverticulitis and dysphagia  PLAN:   Procedure:  EGD and colonoscopy

## 2021-09-03 DIAGNOSIS — K27.9 PEPTIC ULCER DISEASE: Primary | ICD-10-CM

## 2021-09-03 RX ORDER — PANTOPRAZOLE SODIUM 40 MG/1
40 TABLET, DELAYED RELEASE ORAL
Qty: 180 TABLET | Refills: 0 | Status: SHIPPED | OUTPATIENT
Start: 2021-09-03 | End: 2021-12-28 | Stop reason: SDUPTHER

## 2021-09-13 ENCOUNTER — TELEPHONE (OUTPATIENT)
Dept: GASTROENTEROLOGY | Facility: CLINIC | Age: 61
End: 2021-09-13

## 2021-09-17 ENCOUNTER — TELEPHONE (OUTPATIENT)
Dept: GASTROENTEROLOGY | Facility: AMBULARY SURGERY CENTER | Age: 61
End: 2021-09-17

## 2021-09-17 NOTE — TELEPHONE ENCOUNTER
Our mutual patient is scheduled for procedure: repeat EGD    On: _12___/_ 23   _/_  2021  _      With:   __Rody_______    He/She is taking the following blood thinner:   eliquis       Can this be stopped __2____ days prior to the procedure?       Physician Approving clearance: ________________________

## 2021-09-17 NOTE — TELEPHONE ENCOUNTER
Patient is scheduled for EGD on December 23 , 2021 at [facility]  with Maria Esther Bailey MD  Patient is aware of pre-procedure prep of npo after mn and they will be called the day prior between 2 and 6 pm for time to report for procedure   Instructions mailed to pt

## 2021-09-22 ENCOUNTER — CLINICAL SUPPORT (OUTPATIENT)
Dept: FAMILY MEDICINE CLINIC | Facility: CLINIC | Age: 61
End: 2021-09-22
Payer: COMMERCIAL

## 2021-09-22 DIAGNOSIS — Z23 ENCOUNTER FOR IMMUNIZATION: Primary | ICD-10-CM

## 2021-09-22 PROCEDURE — 90682 RIV4 VACC RECOMBINANT DNA IM: CPT

## 2021-09-22 PROCEDURE — 90471 IMMUNIZATION ADMIN: CPT

## 2021-09-23 ENCOUNTER — PREPPED CHART (OUTPATIENT)
Dept: URBAN - METROPOLITAN AREA CLINIC 6 | Facility: CLINIC | Age: 61
End: 2021-09-23

## 2021-09-23 DIAGNOSIS — H43.392: ICD-10-CM

## 2021-09-23 DIAGNOSIS — H25.811: ICD-10-CM

## 2021-09-23 DIAGNOSIS — H35.362: ICD-10-CM

## 2021-09-23 DIAGNOSIS — H26.492: ICD-10-CM

## 2021-09-23 PROCEDURE — G8427 DOCREV CUR MEDS BY ELIG CLIN: HCPCS

## 2021-09-23 PROCEDURE — 1036F TOBACCO NON-USER: CPT

## 2021-09-23 PROCEDURE — 92134 CPTRZ OPH DX IMG PST SGM RTA: CPT

## 2021-09-23 PROCEDURE — 92014 COMPRE OPH EXAM EST PT 1/>: CPT

## 2021-09-23 ASSESSMENT — TONOMETRY
OD_IOP_MMHG: 10
OS_IOP_MMHG: 12

## 2021-09-23 ASSESSMENT — VISUAL ACUITY
OS_CC: 20/25+1
OU_CC: J1+
OD_CC: 20/20-1

## 2021-11-13 ENCOUNTER — IMMUNIZATIONS (OUTPATIENT)
Dept: FAMILY MEDICINE CLINIC | Facility: HOSPITAL | Age: 61
End: 2021-11-13

## 2021-11-13 DIAGNOSIS — Z23 ENCOUNTER FOR IMMUNIZATION: Primary | ICD-10-CM

## 2021-11-13 PROCEDURE — 91300 COVID-19 PFIZER VACC 0.3 ML: CPT

## 2021-11-13 PROCEDURE — 0001A COVID-19 PFIZER VACC 0.3 ML: CPT

## 2021-12-22 ENCOUNTER — TELEPHONE (OUTPATIENT)
Dept: GASTROENTEROLOGY | Facility: AMBULARY SURGERY CENTER | Age: 61
End: 2021-12-22

## 2021-12-22 ENCOUNTER — RA CDI HCC (OUTPATIENT)
Dept: OTHER | Facility: HOSPITAL | Age: 61
End: 2021-12-22

## 2021-12-23 ENCOUNTER — HOSPITAL ENCOUNTER (OUTPATIENT)
Dept: GASTROENTEROLOGY | Facility: AMBULARY SURGERY CENTER | Age: 61
Setting detail: OUTPATIENT SURGERY
Discharge: HOME | End: 2021-12-23
Attending: INTERNAL MEDICINE | Admitting: INTERNAL MEDICINE
Payer: COMMERCIAL

## 2021-12-23 ENCOUNTER — ANESTHESIA EVENT (OUTPATIENT)
Dept: GASTROENTEROLOGY | Facility: AMBULARY SURGERY CENTER | Age: 61
End: 2021-12-23

## 2021-12-23 ENCOUNTER — ANESTHESIA (OUTPATIENT)
Dept: GASTROENTEROLOGY | Facility: AMBULARY SURGERY CENTER | Age: 61
End: 2021-12-23

## 2021-12-23 VITALS
WEIGHT: 177 LBS | HEART RATE: 70 BPM | OXYGEN SATURATION: 97 % | HEIGHT: 72 IN | BODY MASS INDEX: 23.98 KG/M2 | SYSTOLIC BLOOD PRESSURE: 100 MMHG | DIASTOLIC BLOOD PRESSURE: 62 MMHG | RESPIRATION RATE: 22 BRPM | TEMPERATURE: 98.1 F

## 2021-12-23 DIAGNOSIS — K20.0 EOSINOPHILIC ESOPHAGITIS: ICD-10-CM

## 2021-12-23 PROCEDURE — 88305 TISSUE EXAM BY PATHOLOGIST: CPT | Performed by: SPECIALIST

## 2021-12-23 PROCEDURE — 43239 EGD BIOPSY SINGLE/MULTIPLE: CPT | Performed by: INTERNAL MEDICINE

## 2021-12-23 RX ORDER — GLYCOPYRROLATE 0.2 MG/ML
INJECTION INTRAMUSCULAR; INTRAVENOUS AS NEEDED
Status: DISCONTINUED | OUTPATIENT
Start: 2021-12-23 | End: 2021-12-23

## 2021-12-23 RX ORDER — LIDOCAINE HYDROCHLORIDE 20 MG/ML
INJECTION, SOLUTION EPIDURAL; INFILTRATION; INTRACAUDAL; PERINEURAL AS NEEDED
Status: DISCONTINUED | OUTPATIENT
Start: 2021-12-23 | End: 2021-12-23

## 2021-12-23 RX ORDER — PROPOFOL 10 MG/ML
INJECTION, EMULSION INTRAVENOUS AS NEEDED
Status: DISCONTINUED | OUTPATIENT
Start: 2021-12-23 | End: 2021-12-23

## 2021-12-23 RX ORDER — SODIUM CHLORIDE, SODIUM LACTATE, POTASSIUM CHLORIDE, CALCIUM CHLORIDE 600; 310; 30; 20 MG/100ML; MG/100ML; MG/100ML; MG/100ML
INJECTION, SOLUTION INTRAVENOUS CONTINUOUS PRN
Status: DISCONTINUED | OUTPATIENT
Start: 2021-12-23 | End: 2021-12-23

## 2021-12-23 RX ADMIN — LIDOCAINE HYDROCHLORIDE 5 ML: 20 INJECTION, SOLUTION EPIDURAL; INFILTRATION; INTRACAUDAL at 12:46

## 2021-12-23 RX ADMIN — SODIUM CHLORIDE, SODIUM LACTATE, POTASSIUM CHLORIDE, AND CALCIUM CHLORIDE: .6; .31; .03; .02 INJECTION, SOLUTION INTRAVENOUS at 12:44

## 2021-12-23 RX ADMIN — PROPOFOL 130 MG: 10 INJECTION, EMULSION INTRAVENOUS at 12:46

## 2021-12-23 RX ADMIN — PROPOFOL 50 MG: 10 INJECTION, EMULSION INTRAVENOUS at 12:52

## 2021-12-23 RX ADMIN — GLYCOPYRROLATE 0.1 MG: 0.2 INJECTION, SOLUTION INTRAMUSCULAR; INTRAVENOUS at 12:46

## 2021-12-28 DIAGNOSIS — K27.9 PEPTIC ULCER DISEASE: ICD-10-CM

## 2021-12-28 RX ORDER — PANTOPRAZOLE SODIUM 40 MG/1
40 TABLET, DELAYED RELEASE ORAL DAILY
Qty: 90 TABLET | Refills: 1 | Status: SHIPPED | OUTPATIENT
Start: 2021-12-28 | End: 2022-06-28

## 2021-12-29 ENCOUNTER — OFFICE VISIT (OUTPATIENT)
Dept: FAMILY MEDICINE CLINIC | Facility: CLINIC | Age: 61
End: 2021-12-29
Payer: COMMERCIAL

## 2021-12-29 VITALS
TEMPERATURE: 97.3 F | WEIGHT: 180 LBS | BODY MASS INDEX: 24.38 KG/M2 | RESPIRATION RATE: 16 BRPM | SYSTOLIC BLOOD PRESSURE: 116 MMHG | HEIGHT: 72 IN | DIASTOLIC BLOOD PRESSURE: 70 MMHG | HEART RATE: 78 BPM

## 2021-12-29 DIAGNOSIS — D86.9 SARCOIDOSIS: ICD-10-CM

## 2021-12-29 DIAGNOSIS — I63.81 LEFT SIDED LACUNAR STROKE (HCC): ICD-10-CM

## 2021-12-29 DIAGNOSIS — I77.810 AORTIC ROOT DILATATION (HCC): ICD-10-CM

## 2021-12-29 DIAGNOSIS — I63.132 CEREBROVASCULAR ACCIDENT (CVA) DUE TO EMBOLISM OF LEFT CAROTID ARTERY (HCC): ICD-10-CM

## 2021-12-29 DIAGNOSIS — I34.0 MODERATE MITRAL REGURGITATION: ICD-10-CM

## 2021-12-29 DIAGNOSIS — I48.0 PAF (PAROXYSMAL ATRIAL FIBRILLATION) (HCC): ICD-10-CM

## 2021-12-29 DIAGNOSIS — Z00.00 WELL ADULT EXAM: Primary | ICD-10-CM

## 2021-12-29 DIAGNOSIS — N18.31 STAGE 3A CHRONIC KIDNEY DISEASE (HCC): ICD-10-CM

## 2021-12-29 DIAGNOSIS — I10 BENIGN ESSENTIAL HYPERTENSION: ICD-10-CM

## 2021-12-29 PROBLEM — G47.33 OBSTRUCTIVE SLEEP APNEA: Status: RESOLVED | Noted: 2017-08-30 | Resolved: 2021-12-29

## 2021-12-29 PROCEDURE — 1036F TOBACCO NON-USER: CPT | Performed by: FAMILY MEDICINE

## 2021-12-29 PROCEDURE — 3725F SCREEN DEPRESSION PERFORMED: CPT | Performed by: FAMILY MEDICINE

## 2021-12-29 PROCEDURE — 3008F BODY MASS INDEX DOCD: CPT | Performed by: FAMILY MEDICINE

## 2021-12-29 PROCEDURE — 99396 PREV VISIT EST AGE 40-64: CPT | Performed by: FAMILY MEDICINE

## 2021-12-30 PROBLEM — K57.90 DIVERTICULOSIS: Status: ACTIVE | Noted: 2021-12-30

## 2022-01-10 ENCOUNTER — HOSPITAL ENCOUNTER (OUTPATIENT)
Dept: NON INVASIVE DIAGNOSTICS | Facility: CLINIC | Age: 62
Discharge: HOME | End: 2022-01-10
Payer: COMMERCIAL

## 2022-01-10 VITALS
HEIGHT: 72 IN | HEART RATE: 64 BPM | SYSTOLIC BLOOD PRESSURE: 110 MMHG | WEIGHT: 180 LBS | BODY MASS INDEX: 24.38 KG/M2 | DIASTOLIC BLOOD PRESSURE: 70 MMHG

## 2022-01-10 DIAGNOSIS — I48.0 PAF (PAROXYSMAL ATRIAL FIBRILLATION) (HCC): ICD-10-CM

## 2022-01-10 DIAGNOSIS — I34.1 MITRAL VALVE PROLAPSE: ICD-10-CM

## 2022-01-10 LAB
AORTIC ROOT: 3.7 CM
APICAL FOUR CHAMBER EJECTION FRACTION: 64 %
ASCENDING AORTA: 3.8 CM
AV LVOT MEAN GRADIENT: 2 MMHG
AV LVOT PEAK GRADIENT: 3 MMHG
AV REGURGITATION PRESSURE HALF TIME: 0.52 MS
DOP CALC LVOT PEAK VEL VTI: 17.73 CM
DOP CALC LVOT PEAK VEL: 0.89 M/S
E WAVE DECELERATION TIME: 150 MS
FRACTIONAL SHORTENING: 35 % (ref 28–44)
INTERVENTRICULAR SEPTUM IN DIASTOLE (PARASTERNAL SHORT AXIS VIEW): 0.7 CM
LEFT ATRIUM AREA SYSTOLE SINGLE PLANE A4C: 20.1 CM2
LEFT ATRIUM SIZE: 4.2 CM
LEFT INTERNAL DIMENSION IN SYSTOLE: 3.2 CM (ref 2.1–4)
LEFT VENTRICULAR INTERNAL DIMENSION IN DIASTOLE: 4.9 CM (ref 4.98–7.42)
LEFT VENTRICULAR POSTERIOR WALL IN END DIASTOLE: 0.8 CM
LEFT VENTRICULAR STROKE VOLUME: 75 ML
MV E'TISSUE VEL-SEP: 9 CM/S
MV PEAK A VEL: 0.82 M/S
MV PEAK E VEL: 61 CM/S
MV STENOSIS PRESSURE HALF TIME: 0 MS
RIGHT ATRIUM AREA SYSTOLE A4C: 14 CM2
RIGHT VENTRICLE ID DIMENSION: 2.6 CM
SL CV AV DECELERATION TIME RETROGRADE: 1792 MS
SL CV AV PEAK GRADIENT RETROGRADE: 41 MMHG
SL CV LV EF: 65
SL CV PED ECHO LEFT VENTRICLE DIASTOLIC VOLUME (MOD BIPLANE) 2D: 114 ML
SL CV PED ECHO LEFT VENTRICLE SYSTOLIC VOLUME (MOD BIPLANE) 2D: 40 ML
TRICUSPID VALVE PEAK REGURGITATION VELOCITY: 1.93 M/S
TV PEAK GRADIENT: 15 MMHG
Z-SCORE OF LEFT VENTRICULAR DIMENSION IN END SYSTOLE: -2.16

## 2022-01-10 PROCEDURE — 93306 TTE W/DOPPLER COMPLETE: CPT

## 2022-01-10 PROCEDURE — 93306 TTE W/DOPPLER COMPLETE: CPT | Performed by: INTERNAL MEDICINE

## 2022-01-12 ENCOUNTER — OFFICE VISIT (OUTPATIENT)
Dept: CARDIOLOGY CLINIC | Facility: CLINIC | Age: 62
End: 2022-01-12
Payer: COMMERCIAL

## 2022-01-12 ENCOUNTER — APPOINTMENT (OUTPATIENT)
Dept: LAB | Facility: CLINIC | Age: 62
End: 2022-01-12
Payer: COMMERCIAL

## 2022-01-12 VITALS
RESPIRATION RATE: 18 BRPM | BODY MASS INDEX: 24.13 KG/M2 | OXYGEN SATURATION: 98 % | HEIGHT: 72 IN | SYSTOLIC BLOOD PRESSURE: 132 MMHG | DIASTOLIC BLOOD PRESSURE: 80 MMHG | WEIGHT: 178.13 LBS | HEART RATE: 80 BPM

## 2022-01-12 DIAGNOSIS — I34.0 MODERATE MITRAL REGURGITATION: ICD-10-CM

## 2022-01-12 DIAGNOSIS — I48.0 PAF (PAROXYSMAL ATRIAL FIBRILLATION) (HCC): ICD-10-CM

## 2022-01-12 DIAGNOSIS — I63.132 CEREBROVASCULAR ACCIDENT (CVA) DUE TO EMBOLISM OF LEFT CAROTID ARTERY (HCC): ICD-10-CM

## 2022-01-12 DIAGNOSIS — I77.810 AORTIC ROOT DILATATION (HCC): ICD-10-CM

## 2022-01-12 DIAGNOSIS — I34.1 MITRAL VALVE PROLAPSE: ICD-10-CM

## 2022-01-12 DIAGNOSIS — I10 BENIGN ESSENTIAL HYPERTENSION: ICD-10-CM

## 2022-01-12 DIAGNOSIS — I48.0 PAF (PAROXYSMAL ATRIAL FIBRILLATION) (HCC): Primary | ICD-10-CM

## 2022-01-12 LAB
ALBUMIN SERPL BCP-MCNC: 3.9 G/DL (ref 3.5–5)
ALP SERPL-CCNC: 47 U/L (ref 46–116)
ALT SERPL W P-5'-P-CCNC: 24 U/L (ref 12–78)
ANION GAP SERPL CALCULATED.3IONS-SCNC: 7 MMOL/L (ref 4–13)
AST SERPL W P-5'-P-CCNC: 16 U/L (ref 5–45)
BILIRUB SERPL-MCNC: 0.69 MG/DL (ref 0.2–1)
BUN SERPL-MCNC: 20 MG/DL (ref 5–25)
CALCIUM SERPL-MCNC: 8.9 MG/DL (ref 8.3–10.1)
CHLORIDE SERPL-SCNC: 106 MMOL/L (ref 100–108)
CHOLEST SERPL-MCNC: 189 MG/DL
CO2 SERPL-SCNC: 28 MMOL/L (ref 21–32)
CREAT SERPL-MCNC: 1.17 MG/DL (ref 0.6–1.3)
GFR SERPL CREATININE-BSD FRML MDRD: 67 ML/MIN/1.73SQ M
GLUCOSE P FAST SERPL-MCNC: 101 MG/DL (ref 65–99)
HDLC SERPL-MCNC: 45 MG/DL
LDLC SERPL CALC-MCNC: 119 MG/DL (ref 0–100)
NONHDLC SERPL-MCNC: 144 MG/DL
POTASSIUM SERPL-SCNC: 4.4 MMOL/L (ref 3.5–5.3)
PROT SERPL-MCNC: 6.7 G/DL (ref 6.4–8.2)
SODIUM SERPL-SCNC: 141 MMOL/L (ref 136–145)
TRIGL SERPL-MCNC: 123 MG/DL

## 2022-01-12 PROCEDURE — 36415 COLL VENOUS BLD VENIPUNCTURE: CPT

## 2022-01-12 PROCEDURE — 80061 LIPID PANEL: CPT

## 2022-01-12 PROCEDURE — 99214 OFFICE O/P EST MOD 30 MIN: CPT | Performed by: INTERNAL MEDICINE

## 2022-01-12 PROCEDURE — 1036F TOBACCO NON-USER: CPT | Performed by: INTERNAL MEDICINE

## 2022-01-12 PROCEDURE — 93000 ELECTROCARDIOGRAM COMPLETE: CPT | Performed by: INTERNAL MEDICINE

## 2022-01-12 PROCEDURE — 80053 COMPREHEN METABOLIC PANEL: CPT

## 2022-01-12 NOTE — PROGRESS NOTES
Follow-up - Cardiology   Simno Rodríguez 62 y o  male MRN: [id number]        Problems    1  PAF (paroxysmal atrial fibrillation) (HCC)  POCT ECG    Comprehensive metabolic panel   2  Moderate mitral regurgitation     3  Mitral valve prolapse     4  Aortic root dilatation (HCC)     5  Benign essential hypertension     6  Cerebrovascular accident (CVA) due to embolism of left carotid artery ([address] )  Lipid panel       Impression:    Nehemiah Iverson follows up with me at the [facility] office        Atrial fibrillation  · Paroxysmal without recurrence, on Tikosyn and low-dose metoprolol  · Secondary prevention Eliquis due to prior lacunar stroke  · QT is normal today, 436 milliseconds  · Denies any bleeding    Aortic root enlargement  · 42 mm as of 1/20, unchanged from prior imaging at that time   · Updated echo 1/10/2022 was personally reviewed, aortic root 41 mm  · Stable and unchanged    Hypertension  · Continue to recommend avoidance of HCTZ due to Tikosyn interaction  · Well controlled on lisinopril/metoprolol    Mitral regurgitation  · Originally felt to be moderate with prolapse  · Follow-up study 1/20 revealed only mild mitral regurgitation with bowing of the mitral valve  · Repeat echo 1/10/2022 suggested moderate mitral regurgitation, but still complete lack of any associated symptoms and continue clinical monitor    Plan:    · Continue six-month follow-up  · Echo repeated and MR remains unchanged, moderate, and without associated symptoms  · AFib remains well controlled on Tikosyn  · Blood pressure is acceptable  · Aortic root Has not enlarged from last measurement to most recent echo  · CMP and lipids ordered    HPI:   Simon Rodríguez is a 62y o  year old male with a history of embolic CVA who, paroxysmal atrial fibrillation, mitral valve prolapse, mitral valve regurgitation, hypertension, history of sarcoidosis, AMY, and 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 presents for a f/u    He had abdominal issues in the fall, also admitted with diverticulitis on 1 occurrence, colonoscopy showed small polyps, diverticular, EGD discovered antral ulcer, he was treated with antacids, he was taking p r n  aspirin for headaches, up to 650 mg  He has stopped doing that  Blood pressure is well controlled  All abdominal symptoms have resolved since, and he did not have any significant bleeding in the setting of an ulcer on chronic Eliquis  QTC is stable today on Tikosyn  He denies any recurrence of atrial fibrillation  His updated echo was done and all is stable including his mitral valve/aortic root enlargement, and he denies any associated symptoms such as dyspnea on exertion  Review of Systems   Constitutional: Negative for appetite change, diaphoresis, fatigue and fever  Respiratory: Negative for chest tightness, shortness of breath and wheezing  Cardiovascular: Negative for chest pain, palpitations and leg swelling  Gastrointestinal: Negative for abdominal pain and blood in stool  Musculoskeletal: Negative for arthralgias and joint swelling  Skin: Negative for rash  Neurological: Negative for dizziness, syncope and light-headedness           Past Medical History:   Diagnosis Date    Allergic rhinitis     Aortic root dilatation (HCC)     Asthma     Benign essential hypertension     Cervical lymphadenopathy     CKD (chronic kidney disease)     [facility]     Diverticulitis of colon     Elevated blood pressure reading     last assessed 01/08/14    Erectile dysfunction of non-organic origin     Fluttering heart     Hypercalcemia     last assessed 05/19/16    Hypertension     Lacunar stroke ([address] )     Mitral valve prolapse     Moderate mitral regurgitation     [facility]mal [facility] fibrillation) [facility]     Palpitations     last assessed 03/29/13    Renal insufficiency     last assessed 09/23/13    Sarcoidosis     Sarcoidosis     TIA (transient ischemic attack)      Social History     Substance and Sexual Activity   Alcohol Use Not Currently    Comment: rare     Social History     Substance and Sexual Activity   Drug Use No     Social History     Tobacco Use   Smoking Status Never Smoker   Smokeless Tobacco Never Used       Allergies:  No Known Allergies    Medications:     Current Outpatient Medications:     apixaban (Eliquis) 5 mg, Take 1 tablet (5 mg total) by mouth 2 (two) times a day, Disp: 60 tablet, Rfl: 11    Ascorbic Acid (VITAMIN C) 1000 MG tablet, Take 1,000 Doses by mouth, Disp: , Rfl:     BIOTIN PO, Take 1 capsule by mouth daily, Disp: , Rfl:     dofetilide (TIKOSYN) 500 mcg capsule, Take 1 capsule (500 mcg total) by mouth every 12 (twelve) hours, Disp: 60 capsule, Rfl: 11    famotidine (PEPCID) 40 MG tablet, Take 0 5 tablets (20 mg total) by mouth daily, Disp: 30 tablet, Rfl: 5    Ferrous Sulfate (IRON) 325 (65 Fe) MG TABS, Take 1 Dose by mouth 3 (three) times a week QOD, Disp: , Rfl:     fluticasone (FLONASE) 50 mcg/act nasal spray, 50 sprays into each nostril daily, Disp: , Rfl:     Glucosamine HCl (GLUCOSAMINE PO), Take 1 capsule by mouth, Disp: , Rfl:     lisinopril (ZESTRIL) 10 mg tablet, Take 1 tablet (10 mg total) by mouth daily, Disp: 30 tablet, Rfl: 11    metoprolol succinate (TOPROL-XL) 25 mg 24 hr tablet, Take 1 tablet (25 mg total) by mouth daily, Disp: 45 tablet, Rfl: 3    Multiple Vitamins-Minerals (PRESERVISION AREDS 2+MULTI VIT PO), Take by mouth, Disp: , Rfl:     pantoprazole (PROTONIX) 40 mg tablet, Take 1 tablet (40 mg total) by mouth daily, Disp: 90 tablet, Rfl: 1    sildenafil (REVATIO) 20 mg tablet, Take 3-5 tabelts 1 hour prior to intercourse , Disp: 30 tablet, Rfl: 1    VITAMIN B COMPLEX-C PO, Take 1 Dose by mouth, Disp: , Rfl:     vitamin E, tocopherol, 400 units capsule, Take 1 Dose by mouth daily, Disp: , Rfl:       Vitals:    01/12/22 0929   BP: 132/80   Pulse:    Resp:    SpO2:      Weight (last 2 days)     Date/Time Weight    01/12/22 0828 80 8 (178 13)        Physical Exam  Constitutional:       General: He is not in acute distress  Appearance: He is not diaphoretic  HENT:      Head: Normocephalic and atraumatic  Eyes:      General: No scleral icterus  Conjunctiva/sclera: Conjunctivae normal    Neck:      Vascular: No JVD  Cardiovascular:      Rate and Rhythm: Normal rate and regular rhythm  Heart sounds: Murmur heard  Pulmonary:      Effort: Pulmonary effort is normal  No respiratory distress  Breath sounds: Normal breath sounds  No decreased breath sounds, wheezing, rhonchi or rales  Musculoskeletal:      Cervical back: Normal range of motion  Right lower leg: Normal  No edema  Left lower leg: Normal  No edema  Skin:     General: Skin is warm and dry  Neurological:      Mental Status: He is alert and oriented to person, place, and time             Laboratory Studies:  Lab Results   Component Value Date    HGBA1C 5 3 05/15/2019    HGBA1C 5 3 06/30/2017    HGBA1C 5 2 02/20/2017     11/11/2015     06/27/2015     11/10/2014    K 4 4 01/12/2022    K 3 9 05/01/2021    K 4 1 02/08/2021    K 4 0 11/11/2015    K 4 1 06/27/2015    K 4 0 11/10/2014     01/12/2022     05/01/2021     02/08/2021     11/11/2015     06/27/2015     11/10/2014    CO2 28 01/12/2022    CO2 25 05/01/2021    CO2 28 02/08/2021    CO2 28 11/11/2015    CO2 28 06/27/2015    CO2 28 11/10/2014    GLUCOSE 81 11/11/2015    GLUCOSE 93 06/27/2015    GLUCOSE 93 11/10/2014    CREATININE 1 17 01/12/2022    CREATININE 1 37 (H) 05/01/2021    CREATININE 1 25 02/08/2021    CREATININE 1 26 11/11/2015    CREATININE 1 17 06/27/2015    CREATININE 1 13 11/10/2014    BUN 20 01/12/2022    BUN 16 05/01/2021    BUN 19 02/08/2021    BUN 19 11/11/2015    BUN 19 06/27/2015    BUN 15 11/10/2014    MG 1 9 09/08/2017    MG 2 2 07/06/2017    MG 2 1 07/05/2017     Lab Results   Component Value Date    WBC 12 23 (H) 05/01/2021 WBC 6 11 11/11/2015    RBC 4 92 05/01/2021    RBC 4 88 11/11/2015    HGB 14 9 05/01/2021    HGB 14 0 11/11/2015    HCT 43 3 05/01/2021    HCT 41 7 11/11/2015    MCV 88 05/01/2021    MCV 86 11/11/2015    MCH 30 3 05/01/2021    MCH 28 7 11/11/2015    RDW 12 4 05/01/2021    RDW 14 4 11/11/2015     05/01/2021     11/11/2015     NT-proBNP: No results for input(s): NTBNP in the last 72 hours     Coags:    Lipid Profile:   Lab Results   Component Value Date    CHOL 153 06/27/2015     Lab Results   Component Value Date    HDL 45 01/12/2022     Lab Results   Component Value Date    LDLCALC 119 (H) 01/12/2022     Lab Results   Component Value Date    TRIG 123 01/12/2022       Cardiac testing:   EKG reviewed personally:    2019-Normal sinus rhythm,  milliseconds  12/20-normal sinus rhythm,  milliseconds  Results for orders placed or performed in visit on 01/12/22   POCT ECG    Impression    Normal sinus rhythm, sinus arrhythmia, normal QTC on Tikosyn 436 milliseconds         Loop recorder interrogation 6/4/2019 - 2 8% AFib burden, 2 episodes of AFib with RVR  Loop recorder interrogation 8/9/2019-0 1% AFib burden  Loop recorder interrogation 11/20-no AFib  Loop recorder extracted 4/21    Echocardiogram   2/18-LVEF 60%, grade 1 DD, trace MR with bowing of the anterior and posterior leaflets, very mild aortic root enlargement at 42 mm  1/20-EF normal, grade 1 diastolic dysfunction, mild-to-moderate MR, aortic root unchanged at 42 mm  1/22-personally reviewed-EF normal, moderate MR, bileaflet prolapse, unchanged aortic root at 41 mm    Cardiac catheterization   5/17 - normal coronaries    Harjeet Clifton MD    Portions of the record may have been created with voice recognition software   Occasional wrong word or "sound a like" substitutions may have occurred due to the inherent limitations of voice recognition software   Read the chart carefully and recognize, using context, where substitutions have occurred

## 2022-01-31 ENCOUNTER — PROCEDURE VISIT (OUTPATIENT)
Dept: FAMILY MEDICINE CLINIC | Facility: CLINIC | Age: 62
End: 2022-01-31
Payer: COMMERCIAL

## 2022-01-31 VITALS
SYSTOLIC BLOOD PRESSURE: 122 MMHG | HEIGHT: 72 IN | BODY MASS INDEX: 24.65 KG/M2 | WEIGHT: 182 LBS | TEMPERATURE: 95.5 F | HEART RATE: 64 BPM | RESPIRATION RATE: 16 BRPM | DIASTOLIC BLOOD PRESSURE: 70 MMHG

## 2022-01-31 DIAGNOSIS — Z23 ENCOUNTER FOR IMMUNIZATION: ICD-10-CM

## 2022-01-31 DIAGNOSIS — D22.5: Primary | ICD-10-CM

## 2022-01-31 PROCEDURE — 88344 IMHCHEM/IMCYTCHM EA MLT ANTB: CPT | Performed by: PATHOLOGY

## 2022-01-31 PROCEDURE — 90471 IMMUNIZATION ADMIN: CPT

## 2022-01-31 PROCEDURE — 3008F BODY MASS INDEX DOCD: CPT | Performed by: INTERNAL MEDICINE

## 2022-01-31 PROCEDURE — 88342 IMHCHEM/IMCYTCHM 1ST ANTB: CPT | Performed by: PATHOLOGY

## 2022-01-31 PROCEDURE — 88305 TISSUE EXAM BY PATHOLOGIST: CPT | Performed by: PATHOLOGY

## 2022-01-31 PROCEDURE — 11400 EXC TR-EXT B9+MARG 0.5 CM<: CPT | Performed by: FAMILY MEDICINE

## 2022-01-31 PROCEDURE — 90750 HZV VACC RECOMBINANT IM: CPT

## 2022-01-31 PROCEDURE — 88341 IMHCHEM/IMCYTCHM EA ADD ANTB: CPT | Performed by: PATHOLOGY

## 2022-01-31 NOTE — PROGRESS NOTES
Skin excision    Date/Time: 1/31/2022 3:15 PM  Performed by: Clair Quintero MD  Authorized by: Clair Quintero MD   Universal Protocol:  Consent: Verbal consent obtained  Risks and benefits: risks, benefits and alternatives were discussed  Consent given by: patient      Procedure Details - Skin Excision:     Number of Lesions:  1  Lesion 1:     Body area:  Trunk    Trunk location:  Back (right lower back )    Initial size (mm):  4       Final defect size (mm):  5    Malignancy: benign lesion      Repair type:  Linear closure    Closure complexity: simple      Surgical defect:  5 mm     Repair Comments: 4 mm x 3 mm darkly pigmented lesion right lower back-see photo  Using aseptic technique base of pigmented lesion was infiltrated with Lidocaine with epi 2 mL- lesion was excised using a 5 mm punch tool  Closed with (2)  3-0 Prolene sutures  Wound care instructions reviewed  Suture removal in 1 week  02/09/2022 addendum    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

## 2022-02-07 ENCOUNTER — CLINICAL SUPPORT (OUTPATIENT)
Dept: FAMILY MEDICINE CLINIC | Facility: CLINIC | Age: 62
End: 2022-02-07

## 2022-02-07 VITALS
HEART RATE: 78 BPM | TEMPERATURE: 97.5 F | BODY MASS INDEX: 24.65 KG/M2 | HEIGHT: 72 IN | WEIGHT: 182 LBS | DIASTOLIC BLOOD PRESSURE: 74 MMHG | OXYGEN SATURATION: 98 % | SYSTOLIC BLOOD PRESSURE: 122 MMHG | RESPIRATION RATE: 18 BRPM

## 2022-02-07 DIAGNOSIS — L81.9 ATYPICAL PIGMENTED SKIN LESION: Primary | ICD-10-CM

## 2022-02-07 PROCEDURE — 99024 POSTOP FOLLOW-UP VISIT: CPT

## 2022-02-07 PROCEDURE — 3008F BODY MASS INDEX DOCD: CPT | Performed by: INTERNAL MEDICINE

## 2022-02-07 NOTE — PROGRESS NOTES
Patient here for suture removal  All vitals in normal range  Two sutures easily removed  Normal healing, no drainage  Covered area with neosporin and a band aid  Advised patient to call the office if any abnormal drainage, foul smell or fever

## 2022-02-09 ENCOUNTER — TELEPHONE (OUTPATIENT)
Dept: FAMILY MEDICINE CLINIC | Facility: CLINIC | Age: 62
End: 2022-02-09

## 2022-02-09 NOTE — TELEPHONE ENCOUNTER
----- Message from Dhruv Novak MD sent at 2/9/2022  7:19 AM EST -----  Call biopsy no cancer "compound nevus" with some atypical cells  No additional treatment needed   Watch for any other changing moles

## 2022-03-28 ENCOUNTER — CLINICAL SUPPORT (OUTPATIENT)
Dept: FAMILY MEDICINE CLINIC | Facility: CLINIC | Age: 62
End: 2022-03-28
Payer: COMMERCIAL

## 2022-03-28 DIAGNOSIS — Z23 ENCOUNTER FOR IMMUNIZATION: Primary | ICD-10-CM

## 2022-03-28 PROCEDURE — 90750 HZV VACC RECOMBINANT IM: CPT

## 2022-03-28 PROCEDURE — 90471 IMMUNIZATION ADMIN: CPT

## 2022-06-10 ENCOUNTER — RA CDI HCC (OUTPATIENT)
Dept: OTHER | Facility: HOSPITAL | Age: 62
End: 2022-06-10

## 2022-06-10 NOTE — PROGRESS NOTES
[address] [place]  sarcoidosis    No documentation supports further classification of the dx       [facility]  coding opportunities       Chart reviewed, no opportunity found: CHART REVIEWED, NO OPPORTUNITY FOUND        Patients Insurance        Commercial Insurance: [address] [place] [address]

## 2022-06-28 DIAGNOSIS — K27.9 PEPTIC ULCER DISEASE: ICD-10-CM

## 2022-06-28 RX ORDER — PANTOPRAZOLE SODIUM 40 MG/1
TABLET, DELAYED RELEASE ORAL
Qty: 90 TABLET | Refills: 0 | Status: SHIPPED | OUTPATIENT
Start: 2022-06-28

## 2022-06-30 DIAGNOSIS — I10 ESSENTIAL HYPERTENSION: ICD-10-CM

## 2022-06-30 RX ORDER — METOPROLOL SUCCINATE 25 MG/1
TABLET, EXTENDED RELEASE ORAL
Qty: 90 TABLET | Refills: 0 | Status: SHIPPED | OUTPATIENT
Start: 2022-06-30 | End: 2022-07-29 | Stop reason: SDUPTHER

## 2022-07-21 DIAGNOSIS — I48.0 PAF (PAROXYSMAL ATRIAL FIBRILLATION) (HCC): ICD-10-CM

## 2022-07-21 DIAGNOSIS — I48.91 ATRIAL FIBRILLATION, UNSPECIFIED TYPE (HCC): ICD-10-CM

## 2022-07-21 RX ORDER — DOFETILIDE 0.5 MG/1
CAPSULE ORAL
Qty: 60 CAPSULE | Refills: 2 | Status: SHIPPED | OUTPATIENT
Start: 2022-07-21 | End: 2022-07-29 | Stop reason: SDUPTHER

## 2022-07-21 RX ORDER — APIXABAN 5 MG/1
TABLET, FILM COATED ORAL
Qty: 60 TABLET | Refills: 2 | Status: SHIPPED | OUTPATIENT
Start: 2022-07-21 | End: 2022-07-29 | Stop reason: SDUPTHER

## 2022-07-29 ENCOUNTER — APPOINTMENT (OUTPATIENT)
Dept: LAB | Facility: CLINIC | Age: 62
End: 2022-07-29
Payer: COMMERCIAL

## 2022-07-29 ENCOUNTER — OFFICE VISIT (OUTPATIENT)
Dept: CARDIOLOGY CLINIC | Facility: CLINIC | Age: 62
End: 2022-07-29
Payer: COMMERCIAL

## 2022-07-29 VITALS
WEIGHT: 186.1 LBS | HEART RATE: 72 BPM | BODY MASS INDEX: 25.21 KG/M2 | HEIGHT: 72 IN | DIASTOLIC BLOOD PRESSURE: 76 MMHG | OXYGEN SATURATION: 97 % | SYSTOLIC BLOOD PRESSURE: 110 MMHG

## 2022-07-29 DIAGNOSIS — I48.0 PAF (PAROXYSMAL ATRIAL FIBRILLATION) (HCC): Primary | ICD-10-CM

## 2022-07-29 DIAGNOSIS — N18.31 STAGE 3A CHRONIC KIDNEY DISEASE (HCC): ICD-10-CM

## 2022-07-29 DIAGNOSIS — I48.91 ATRIAL FIBRILLATION, UNSPECIFIED TYPE (HCC): ICD-10-CM

## 2022-07-29 DIAGNOSIS — I34.1 MITRAL VALVE PROLAPSE: ICD-10-CM

## 2022-07-29 DIAGNOSIS — I77.810 AORTIC ROOT DILATATION (HCC): ICD-10-CM

## 2022-07-29 DIAGNOSIS — I34.0 MODERATE MITRAL REGURGITATION: ICD-10-CM

## 2022-07-29 DIAGNOSIS — I10 ESSENTIAL HYPERTENSION: ICD-10-CM

## 2022-07-29 DIAGNOSIS — I10 BENIGN ESSENTIAL HYPERTENSION: ICD-10-CM

## 2022-07-29 DIAGNOSIS — I48.0 PAF (PAROXYSMAL ATRIAL FIBRILLATION) (HCC): ICD-10-CM

## 2022-07-29 LAB
ANION GAP SERPL CALCULATED.3IONS-SCNC: 4 MMOL/L (ref 4–13)
BUN SERPL-MCNC: 15 MG/DL (ref 5–25)
CALCIUM SERPL-MCNC: 8.8 MG/DL (ref 8.4–10.2)
CHLORIDE SERPL-SCNC: 106 MMOL/L (ref 96–108)
CHOLEST SERPL-MCNC: 174 MG/DL
CO2 SERPL-SCNC: 28 MMOL/L (ref 21–32)
CREAT SERPL-MCNC: 1.23 MG/DL (ref 0.6–1.3)
GFR SERPL CREATININE-BSD FRML MDRD: 63 ML/MIN/1.73SQ M
GLUCOSE P FAST SERPL-MCNC: 95 MG/DL (ref 65–99)
HDLC SERPL-MCNC: 40 MG/DL
LDLC SERPL CALC-MCNC: 113 MG/DL (ref 0–100)
NONHDLC SERPL-MCNC: 134 MG/DL
POTASSIUM SERPL-SCNC: 4 MMOL/L (ref 3.5–5.3)
SODIUM SERPL-SCNC: 138 MMOL/L (ref 135–147)
TRIGL SERPL-MCNC: 107 MG/DL

## 2022-07-29 PROCEDURE — 80048 BASIC METABOLIC PNL TOTAL CA: CPT

## 2022-07-29 PROCEDURE — 36415 COLL VENOUS BLD VENIPUNCTURE: CPT

## 2022-07-29 PROCEDURE — 80061 LIPID PANEL: CPT

## 2022-07-29 PROCEDURE — 93000 ELECTROCARDIOGRAM COMPLETE: CPT | Performed by: INTERNAL MEDICINE

## 2022-07-29 PROCEDURE — 99214 OFFICE O/P EST MOD 30 MIN: CPT | Performed by: INTERNAL MEDICINE

## 2022-07-29 RX ORDER — METOPROLOL SUCCINATE 25 MG/1
12.5 TABLET, EXTENDED RELEASE ORAL DAILY
Qty: 45 TABLET | Refills: 3 | Status: SHIPPED | OUTPATIENT
Start: 2022-07-29

## 2022-07-29 RX ORDER — DOFETILIDE 0.5 MG/1
500 CAPSULE ORAL EVERY 12 HOURS
Qty: 180 CAPSULE | Refills: 3 | Status: SHIPPED | OUTPATIENT
Start: 2022-07-29

## 2022-07-29 RX ORDER — LISINOPRIL 10 MG/1
10 TABLET ORAL DAILY
Qty: 90 TABLET | Refills: 3 | Status: SHIPPED | OUTPATIENT
Start: 2022-07-29

## 2022-07-29 NOTE — PROGRESS NOTES
Follow-up - Cardiology   Kiel Beltran 62 y o  male MRN: [id number]        Problems    1  PAF (paroxysmal atrial fibrillation) (HCC)  POCT ECG    apixaban (Eliquis) 5 mg    Echo complete w/ contrast if indicated    Basic metabolic panel    Basic metabolic panel   2  Moderate mitral regurgitation  Echo complete w/ contrast if indicated    Lipid panel    Lipid panel   3  Mitral valve prolapse     4  Stage 3a chronic kidney disease ([address] )     5  Aortic root dilatation (HCC)     6  Benign essential hypertension  lisinopril (ZESTRIL) 10 mg tablet   7  Essential hypertension  metoprolol succinate (TOPROL-XL) 25 mg 24 hr tablet   8  Atrial fibrillation, unspecified type (HCC)  dofetilide (TIKOSYN) 500 mcg capsule       Impression:    Janine Montelongo follows up with me at the [facility] office  Atrial fibrillation  Paroxysmal without recurrence, on Tikosyn and low-dose metoprolol  Secondary prevention Eliquis due to prior lacunar stroke  QT is normal today, 436 milliseconds  Denies any bleeding    Aortic root enlargement  42 mm as of 1/20, unchanged from prior imaging at that time   Updated echo 1/10/2022 was personally reviewed, aortic root 41 mm  Stable and unchanged    Hypertension  Continue to recommend avoidance of HCTZ due to Tikosyn interaction  Well controlled on lisinopril/metoprolol    Mitral regurgitation  Originally felt to be moderate with prolapse  Follow-up study 1/20 revealed only mild mitral regurgitation with bowing of the mitral valve  Repeat echo 1/10/2022 suggested moderate mitral regurgitation, but still complete lack of any associated symptoms and continue clinical monitor    Plan:    Repeat echo in 6 months for aortic root and moderate MR  Check lipids and BMP now, and again in 6 months  Meds refilled at 90 day supply  Stable on Tikosyn with normal QT   and no AFib recurrence  Recommended he discuss his knee paresthesias with his PCP for further workup      HPI:   Kiel Beltran is a 62y o  year old male with a history of embolic CVA who, paroxysmal atrial fibrillation, mitral valve prolapse, mitral valve regurgitation, hypertension, history of sarcoidosis, AMY, and 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 presents for a f/u    He feels well, he has had no AFib recurrence, his blood pressure is excellent, he did not have labs done on account of nothing ordered, I apologized for that  He will get labs done today, he is fasting  He denies shortness of breath, palpitations, chest pain, orthopnea, lower extremity edema  He continues to be stable on dofetilide with a normal QT on today's EKG, in normal sinus rhythm, and on Eliquis for secondary prevention for stroke  His only symptom today is a strange paresthesias symptom down the front of his right knee, without a change in range of motion or any discomfort  Review of Systems   Constitutional: Negative for appetite change, diaphoresis, fatigue and fever  Respiratory: Negative for chest tightness, shortness of breath and wheezing  Cardiovascular: Negative for chest pain, palpitations and leg swelling  Gastrointestinal: Negative for abdominal pain and blood in stool  Musculoskeletal: Negative for arthralgias and joint swelling  Skin: Negative for rash  Neurological: Negative for dizziness, syncope and light-headedness           Past Medical History:   Diagnosis Date    Allergic rhinitis     Aortic root dilatation (HCC)     Asthma     Benign essential hypertension     Cervical lymphadenopathy     CKD (chronic kidney disease)     [facility]     Diverticulitis of colon     Elevated blood pressure reading     last assessed 01/08/14    Erectile dysfunction of non-organic origin     Fluttering heart     Hypercalcemia     last assessed 05/19/16    Hypertension     [facility] ([facility] [address] [facility]     Mitral valve prolapse     Moderate mitral regurgitation     [facility]     Palpitations     last assessed 03/29/13  Renal insufficiency     last assessed 09/23/13    Sarcoidosis     Sarcoidosis     TIA (transient ischemic attack)      Social History     Substance and Sexual Activity   Alcohol Use Not Currently    Comment: rare     Social History     Substance and Sexual Activity   Drug Use No     Social History     Tobacco Use   Smoking Status Never Smoker   Smokeless Tobacco Never Used       Allergies:  No Known Allergies    Medications:     Current Outpatient Medications:     apixaban (Eliquis) 5 mg, Take 1 tablet (5 mg total) by mouth 2 (two) times a day, Disp: 180 tablet, Rfl: 3    Ascorbic Acid (VITAMIN C) 1000 MG tablet, Take 1,000 Doses by mouth, Disp: , Rfl:     BIOTIN PO, Take 1 capsule by mouth daily, Disp: , Rfl:     dofetilide (TIKOSYN) 500 mcg capsule, Take 1 capsule (500 mcg total) by mouth every 12 (twelve) hours, Disp: 180 capsule, Rfl: 3    famotidine (PEPCID) 40 MG tablet, Take 0 5 tablets (20 mg total) by mouth daily, Disp: 30 tablet, Rfl: 5    Ferrous Sulfate (IRON) 325 (65 Fe) MG TABS, Take 1 Dose by mouth 3 (three) times a week QOD, Disp: , Rfl:     fluticasone (FLONASE) 50 mcg/act nasal spray, 50 sprays into each nostril daily, Disp: , Rfl:     Glucosamine HCl (GLUCOSAMINE PO), Take 1 capsule by mouth, Disp: , Rfl:     lisinopril (ZESTRIL) 10 mg tablet, Take 1 tablet (10 mg total) by mouth daily, Disp: 90 tablet, Rfl: 3    metoprolol succinate (TOPROL-XL) 25 mg 24 hr tablet, Take 0 5 tablets (12 5 mg total) by mouth daily, Disp: 45 tablet, Rfl: 3    Multiple Vitamins-Minerals (PRESERVISION AREDS 2+MULTI VIT PO), Take by mouth, Disp: , Rfl:     pantoprazole (PROTONIX) 40 mg tablet, TAKE 1 TABLET BY MOUTH DAILY, Disp: 90 tablet, Rfl: 0    sildenafil (REVATIO) 20 mg tablet, Take 3-5 tabelts 1 hour prior to intercourse , Disp: 30 tablet, Rfl: 1    VITAMIN B COMPLEX-C PO, Take 1 Dose by mouth, Disp: , Rfl:     vitamin E, tocopherol, 400 units capsule, Take 1 Dose by mouth daily, Disp: , Rfl:       Vitals:    07/29/22 0756   BP: 110/76   Pulse: 72   SpO2: 97%     Weight (last 2 days)     Date/Time Weight    07/29/22 0756 84 4 (186 1)        Physical Exam  Constitutional:       General: He is not in acute distress  Appearance: He is not diaphoretic  HENT:      Head: Normocephalic and atraumatic  Eyes:      General: No scleral icterus  Conjunctiva/sclera: Conjunctivae normal    Neck:      Vascular: No JVD  Cardiovascular:      Rate and Rhythm: Normal rate and regular rhythm  Heart sounds: Normal heart sounds  No murmur heard  Pulmonary:      Effort: Pulmonary effort is normal  No respiratory distress  Breath sounds: Normal breath sounds  No decreased breath sounds, wheezing, rhonchi or rales  Musculoskeletal:      Cervical back: Normal range of motion  Right lower leg: Normal  No edema  Left lower leg: Normal  No edema  Skin:     General: Skin is warm and dry  Neurological:      Mental Status: He is alert and oriented to person, place, and time             Laboratory Studies:  Lab Results   Component Value Date    HGBA1C 5 3 05/15/2019    HGBA1C 5 3 06/30/2017    HGBA1C 5 2 02/20/2017     11/11/2015     06/27/2015     11/10/2014    K 4 4 01/12/2022    K 3 9 05/01/2021    K 4 1 02/08/2021    K 4 0 11/11/2015    K 4 1 06/27/2015    K 4 0 11/10/2014     01/12/2022     05/01/2021     02/08/2021     11/11/2015     06/27/2015     11/10/2014    CO2 28 01/12/2022    CO2 25 05/01/2021    CO2 28 02/08/2021    CO2 28 11/11/2015    CO2 28 06/27/2015    CO2 28 11/10/2014    GLUCOSE 81 11/11/2015    GLUCOSE 93 06/27/2015    GLUCOSE 93 11/10/2014    CREATININE 1 17 01/12/2022    CREATININE 1 37 (H) 05/01/2021    CREATININE 1 25 02/08/2021    CREATININE 1 26 11/11/2015    CREATININE 1 17 06/27/2015    CREATININE 1 13 11/10/2014    BUN 20 01/12/2022    BUN 16 05/01/2021    BUN 19 02/08/2021    BUN 19 11/11/2015    BUN 19 06/27/2015    BUN 15 11/10/2014    MG 1 9 09/08/2017    MG 2 2 07/06/2017    MG 2 1 07/05/2017     Lab Results   Component Value Date    WBC 12 23 (H) 05/01/2021    WBC 6 11 11/11/2015    RBC 4 92 05/01/2021    RBC 4 88 11/11/2015    HGB 14 9 05/01/2021    HGB 14 0 11/11/2015    HCT 43 3 05/01/2021    HCT 41 7 11/11/2015    MCV 88 05/01/2021    MCV 86 11/11/2015    MCH 30 3 05/01/2021    MCH 28 7 11/11/2015    RDW 12 4 05/01/2021    RDW 14 4 11/11/2015     05/01/2021     11/11/2015     NT-proBNP: No results for input(s): NTBNP in the last 72 hours     Coags:    Lipid Profile:   Lab Results   Component Value Date    CHOL 153 06/27/2015     Lab Results   Component Value Date    HDL 45 01/12/2022     Lab Results   Component Value Date    LDLCALC 119 (H) 01/12/2022     Lab Results   Component Value Date    TRIG 123 01/12/2022       Cardiac testing:   EKG reviewed personally:    2019-Normal sinus rhythm,  milliseconds  12/20-normal sinus rhythm,  milliseconds  Results for orders placed or performed in visit on 07/29/22   POCT ECG    Impression    Sinus rhythm, normal EKG,  milliseconds         Loop recorder interrogation 6/4/2019 - 2 8% AFib burden, 2 episodes of AFib with RVR  Loop recorder interrogation 8/9/2019-0 1% AFib burden  Loop recorder interrogation 11/20-no AFib  Loop recorder extracted 4/21    Echocardiogram   2/18-LVEF 60%, grade 1 DD, trace MR with bowing of the anterior and posterior leaflets, very mild aortic root enlargement at 42 mm  1/20-EF normal, grade 1 diastolic dysfunction, mild-to-moderate MR, aortic root unchanged at 42 mm  1/22-personally reviewed-EF normal, moderate MR, bileaflet prolapse, unchanged aortic root at 41 mm    Cardiac catheterization   5/17 - normal coronaries    Christine Castaneda MD    Portions of the record may have been created with voice recognition software   Occasional wrong word or "sound a like" substitutions may have occurred due to the inherent limitations of voice recognition software   Read the chart carefully and recognize, using context, where substitutions have occurred

## 2022-09-23 ENCOUNTER — ESTABLISHED COMPREHENSIVE EXAM (OUTPATIENT)
Dept: URBAN - METROPOLITAN AREA CLINIC 6 | Facility: CLINIC | Age: 62
End: 2022-09-23

## 2022-09-23 DIAGNOSIS — H25.811: ICD-10-CM

## 2022-09-23 DIAGNOSIS — H26.492: ICD-10-CM

## 2022-09-23 DIAGNOSIS — H43.392: ICD-10-CM

## 2022-09-23 DIAGNOSIS — H35.362: ICD-10-CM

## 2022-09-23 PROCEDURE — 92134 CPTRZ OPH DX IMG PST SGM RTA: CPT

## 2022-09-23 PROCEDURE — 92014 COMPRE OPH EXAM EST PT 1/>: CPT

## 2022-09-23 PROCEDURE — 92015 DETERMINE REFRACTIVE STATE: CPT

## 2022-09-23 ASSESSMENT — TONOMETRY
OS_IOP_MMHG: 14
OD_IOP_MMHG: 14

## 2022-09-23 ASSESSMENT — VISUAL ACUITY
OU_CC: J1+
OD_CC: 20/25
OS_CC: 20/30

## 2022-09-26 DIAGNOSIS — K27.9 PEPTIC ULCER DISEASE: ICD-10-CM

## 2022-09-26 RX ORDER — PANTOPRAZOLE SODIUM 40 MG/1
TABLET, DELAYED RELEASE ORAL
Qty: 90 TABLET | Refills: 0 | Status: SHIPPED | OUTPATIENT
Start: 2022-09-26

## 2022-10-03 ENCOUNTER — VBI (OUTPATIENT)
Dept: ADMINISTRATIVE | Facility: OTHER | Age: 62
End: 2022-10-03

## 2023-01-02 DIAGNOSIS — K27.9 PEPTIC ULCER DISEASE: ICD-10-CM

## 2023-01-03 RX ORDER — PANTOPRAZOLE SODIUM 40 MG/1
TABLET, DELAYED RELEASE ORAL
Qty: 90 TABLET | Refills: 0 | Status: SHIPPED | OUTPATIENT
Start: 2023-01-03

## 2023-01-17 ENCOUNTER — APPOINTMENT (OUTPATIENT)
Dept: LAB | Facility: CLINIC | Age: 63
End: 2023-01-17

## 2023-01-17 DIAGNOSIS — I34.0 MODERATE MITRAL REGURGITATION: ICD-10-CM

## 2023-01-17 LAB
CHOLEST SERPL-MCNC: 180 MG/DL
HDLC SERPL-MCNC: 41 MG/DL
LDLC SERPL CALC-MCNC: 114 MG/DL (ref 0–100)
NONHDLC SERPL-MCNC: 139 MG/DL
TRIGL SERPL-MCNC: 123 MG/DL

## 2023-01-30 ENCOUNTER — HOSPITAL ENCOUNTER (OUTPATIENT)
Dept: NON INVASIVE DIAGNOSTICS | Facility: CLINIC | Age: 63
Discharge: HOME | End: 2023-01-30

## 2023-01-30 VITALS
HEART RATE: 60 BPM | DIASTOLIC BLOOD PRESSURE: 76 MMHG | WEIGHT: 186.07 LBS | HEIGHT: 72 IN | BODY MASS INDEX: 25.2 KG/M2 | SYSTOLIC BLOOD PRESSURE: 110 MMHG

## 2023-01-30 DIAGNOSIS — I34.0 MODERATE MITRAL REGURGITATION: ICD-10-CM

## 2023-01-30 DIAGNOSIS — I48.0 PAF (PAROXYSMAL ATRIAL FIBRILLATION) (HCC): ICD-10-CM

## 2023-01-30 LAB
AORTIC ROOT: 3.9 CM
APICAL FOUR CHAMBER EJECTION FRACTION: 61 %
ASCENDING AORTA: 4 CM
E WAVE DECELERATION TIME: 184 MS
FRACTIONAL SHORTENING: 47 (ref 28–44)
INTERVENTRICULAR SEPTUM IN DIASTOLE (PARASTERNAL SHORT AXIS VIEW): 1.1 CM
INTERVENTRICULAR SEPTUM: 1.1 CM (ref 0.6–1.1)
LAAS-AP2: 13 CM2
LAAS-AP4: 16.1 CM2
LEFT ATRIUM AREA SYSTOLE SINGLE PLANE A4C: 17.6 CM2
LEFT ATRIUM SIZE: 4.1 CM
LEFT INTERNAL DIMENSION IN SYSTOLE: 2.8 CM (ref 2.1–4)
LEFT VENTRICULAR INTERNAL DIMENSION IN DIASTOLE: 5.3 CM (ref 3.5–6)
LEFT VENTRICULAR POSTERIOR WALL IN END DIASTOLE: 0.8 CM
LEFT VENTRICULAR STROKE VOLUME: 103 ML
LVSV (TEICH): 103 ML
MV E'TISSUE VEL-SEP: 9 CM/S
MV PEAK A VEL: 0.7 M/S
MV PEAK E VEL: 55 CM/S
MV STENOSIS PRESSURE HALF TIME: 53 MS
MV VALVE AREA P 1/2 METHOD: 4.15
RIGHT ATRIUM AREA SYSTOLE A4C: 16.5 CM2
RIGHT VENTRICLE ID DIMENSION: 3 CM
SL CV LEFT ATRIUM LENGTH A2C: 3.7 CM
SL CV LV EF: 60
SL CV PED ECHO LEFT VENTRICLE DIASTOLIC VOLUME (MOD BIPLANE) 2D: 133 ML
SL CV PED ECHO LEFT VENTRICLE SYSTOLIC VOLUME (MOD BIPLANE) 2D: 29 ML

## 2023-02-08 ENCOUNTER — OFFICE VISIT (OUTPATIENT)
Dept: CARDIOLOGY CLINIC | Facility: CLINIC | Age: 63
End: 2023-02-08

## 2023-02-08 VITALS
HEART RATE: 70 BPM | BODY MASS INDEX: 25.06 KG/M2 | DIASTOLIC BLOOD PRESSURE: 82 MMHG | HEIGHT: 72 IN | SYSTOLIC BLOOD PRESSURE: 122 MMHG | WEIGHT: 185 LBS

## 2023-02-08 DIAGNOSIS — I10 BENIGN ESSENTIAL HYPERTENSION: ICD-10-CM

## 2023-02-08 DIAGNOSIS — I48.0 PAF (PAROXYSMAL ATRIAL FIBRILLATION) (HCC): Primary | ICD-10-CM

## 2023-02-08 DIAGNOSIS — I34.0 MODERATE MITRAL REGURGITATION: ICD-10-CM

## 2023-02-08 DIAGNOSIS — I34.1 MITRAL VALVE PROLAPSE: ICD-10-CM

## 2023-02-08 DIAGNOSIS — I77.810 AORTIC ROOT DILATATION (HCC): ICD-10-CM

## 2023-02-08 NOTE — PROGRESS NOTES
Follow-up - Cardiology   Marvel Ceeer 65 y o  male MRN: [id number]        Problems    1  PAF (paroxysmal atrial fibrillation) (HCC)  POCT ECG    Basic metabolic panel    Lipid panel      2  Moderate mitral regurgitation        3  Mitral valve prolapse        4  Aortic root dilatation (HCC)        5  Benign essential hypertension            Impression:    Ethan Escobar follows up with me at the [place] office  Atrial fibrillation  Paroxysmal without recurrence, on Tikosyn and low-dose metoprolol  Continues on Eliquis for secondary prevention due to prior stroke  QTc remains normal  No bleeding    Aortic root enlargement  42 mm as of 1/20, unchanged from prior imaging at that time   Echoes since that time have shown progressively smaller aortic root sizes, now only 38 mm as of 1/30/2023    Hypertension  Continue to recommend avoidance of HCTZ due to Tikosyn interaction  Excellent control on lisinopril/metoprolol    Mitral regurgitation  Originally felt to be moderate with prolapse, likely due to atrial fibrillation  Echoes have continued to show no significant prolapse, very minimal thickening, most recent 1/30/2023 barely even registers as bowing, and only trivial MR now  Plan:    Maintaining sinus rhythm  Fitbit shows heart rates once or twice a day very briefly hitting about 1 30-1 40  These could be episodes of sinus tachycardia if he jumps off the couch to let the dog out, it could be brief episodes of recurrent atrial fibrillation, but there is so short, so infrequent, and completely asymptomatic, would not change any medical therapy if there are these brief recurrences of A-fib anyway  Continue Tikosyn  Blood pressure excellent  Reviewed his echo personally, there is no suggestion of LVH, he had trace to mild MR, with no significant abnormality of his mitral valve noted, no obvious bowing of the valve, certainly no prolapse    Aortic root only measuring around 36 to 38 mm, thus no change in if anything measuring smaller than it did 3 years ago  No additional imaging recommended at this time    HPI:   Tarun Hollingsworth is a 65y o  year old male with a history of embolic CVA who, paroxysmal atrial fibrillation, mitral valve prolapse, mitral valve regurgitation, hypertension, history of sarcoidosis, AMY, and 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 presents for a f/u    He had a dizzy spell a few weeks back, did not last too long, occurred upon awakening, it was more of a room spinning feeling, has not recurred  Took his blood pressure which was normal   He wears a Fitbit, he does have isolated elevated heart rates between 130 140 bpm that are very brief, random times of the day, sometimes in the evenings, he has not had any palpitations  He continues on metoprolol, lisinopril, Eliquis, Tikosyn, he maintains sinus rhythm on EKG today, lipids are normal, and he has no other cardiac complaints for me today  Echo was personally reviewed, repeated 1/30/2023, aortic root upper normal 38 mm, smaller than previously measured, MR is only trivial, no bowing of the mitral valve prolapse    Review of Systems   Constitutional: Negative for appetite change, diaphoresis, fatigue and fever  Respiratory: Negative for chest tightness, shortness of breath and wheezing  Cardiovascular: Negative for chest pain, palpitations and leg swelling  Gastrointestinal: Negative for abdominal pain and blood in stool  Musculoskeletal: Negative for arthralgias and joint swelling  Skin: Negative for rash  Neurological: Positive for dizziness  Negative for syncope and light-headedness           Past Medical History:   Diagnosis Date   • Allergic rhinitis    • Aortic root dilatation (HCC)    • Asthma    • Benign essential hypertension    • Cervical lymphadenopathy    • CKD (chronic kidney disease)    • [facility]    • Diverticulitis of colon    • Elevated blood pressure reading     last assessed 01/08/14   • Erectile dysfunction of non-organic origin    • Fluttering heart    • Hypercalcemia     last assessed 05/19/16   • Hypertension    • Lacunar stroke [facility])    • Mitral valve prolapse    • Moderate mitral regurgitation    • PAF (paroxysmal atrial fibrillation) (HCC)    • Palpitations     last assessed 03/29/13   • Renal insufficiency     last assessed 09/23/13   • Sarcoidosis    • Sarcoidosis    • TIA (transient ischemic attack)      Social History     Substance and Sexual Activity   Alcohol Use Not Currently    Comment: rare     Social History     Substance and Sexual Activity   Drug Use No     Social History     Tobacco Use   Smoking Status Never   Smokeless Tobacco Never       Allergies:  No Known Allergies    Medications:     Current Outpatient Medications:   •  apixaban (Eliquis) 5 mg, Take 1 tablet (5 mg total) by mouth 2 (two) times a day, Disp: 180 tablet, Rfl: 3  •  Ascorbic Acid (VITAMIN C) 1000 MG tablet, Take 1,000 Doses by mouth, Disp: , Rfl:   •  BIOTIN PO, Take 1 capsule by mouth daily, Disp: , Rfl:   •  dofetilide (TIKOSYN) 500 mcg capsule, Take 1 capsule (500 mcg total) by mouth every 12 (twelve) hours, Disp: 180 capsule, Rfl: 3  •  famotidine (PEPCID) 40 MG tablet, Take 0 5 tablets (20 mg total) by mouth daily, Disp: 30 tablet, Rfl: 5  •  Ferrous Sulfate (IRON) 325 (65 Fe) MG TABS, Take 1 Dose by mouth 3 (three) times a week QOD, Disp: , Rfl:   •  fluticasone (FLONASE) 50 mcg/act nasal spray, 50 sprays into each nostril daily, Disp: , Rfl:   •  Glucosamine HCl (GLUCOSAMINE PO), Take 1 capsule by mouth, Disp: , Rfl:   •  lisinopril (ZESTRIL) 10 mg tablet, Take 1 tablet (10 mg total) by mouth daily, Disp: 90 tablet, Rfl: 3  •  metoprolol succinate (TOPROL-XL) 25 mg 24 hr tablet, Take 0 5 tablets (12 5 mg total) by mouth daily, Disp: 45 tablet, Rfl: 3  •  Multiple Vitamins-Minerals (PRESERVISION AREDS 2+MULTI VIT PO), Take by mouth, Disp: , Rfl:   •  pantoprazole (PROTONIX) 40 mg tablet, TAKE 1 TABLET BY MOUTH DAILY, Disp: 90 tablet, Rfl: 0  •  sildenafil (REVATIO) 20 mg tablet, Take 3-5 tabelts 1 hour prior to intercourse , Disp: 30 tablet, Rfl: 1  •  VITAMIN B COMPLEX-C PO, Take 1 Dose by mouth, Disp: , Rfl:   •  vitamin E, tocopherol, 400 units capsule, Take 1 Dose by mouth daily, Disp: , Rfl:       Vitals:    02/08/23 1055   BP: 122/82   Pulse: 70     Weight (last 2 days)     Date/Time Weight    02/08/23 1055 83 9 (185)        Physical Exam  Constitutional:       General: He is not in acute distress  Appearance: He is not diaphoretic  HENT:      Head: Normocephalic and atraumatic  Eyes:      General: No scleral icterus  Conjunctiva/sclera: Conjunctivae normal    Neck:      Vascular: No JVD  Cardiovascular:      Rate and Rhythm: Normal rate and regular rhythm  Heart sounds: Normal heart sounds  No murmur heard  Pulmonary:      Effort: Pulmonary effort is normal  No respiratory distress  Breath sounds: Normal breath sounds  No decreased breath sounds, wheezing, rhonchi or rales  Musculoskeletal:      Cervical back: Normal range of motion  Right lower leg: Normal  No edema  Left lower leg: Normal  No edema  Skin:     General: Skin is warm and dry  Neurological:      Mental Status: He is alert and oriented to person, place, and time             Laboratory Studies:  Lab Results   Component Value Date    HGBA1C 5 3 05/15/2019    HGBA1C 5 3 06/30/2017    HGBA1C 5 2 02/20/2017     11/11/2015     06/27/2015     11/10/2014    K 4 0 07/29/2022    K 4 4 01/12/2022    K 3 9 05/01/2021    K 4 0 11/11/2015    K 4 1 06/27/2015    K 4 0 11/10/2014     07/29/2022     01/12/2022     05/01/2021     11/11/2015     06/27/2015     11/10/2014    CO2 28 07/29/2022    CO2 28 01/12/2022    CO2 25 05/01/2021    CO2 28 11/11/2015    CO2 28 06/27/2015    CO2 28 11/10/2014    GLUCOSE 81 11/11/2015    GLUCOSE 93 06/27/2015    GLUCOSE 93 11/10/2014    CREATININE 1 23 07/29/2022    CREATININE 1 17 01/12/2022    CREATININE 1 37 (H) 05/01/2021    CREATININE 1 26 11/11/2015    CREATININE 1 17 06/27/2015    CREATININE 1 13 11/10/2014    BUN 15 07/29/2022    BUN 20 01/12/2022    BUN 16 05/01/2021    BUN 19 11/11/2015    BUN 19 06/27/2015    BUN 15 11/10/2014    MG 1 9 09/08/2017    MG 2 2 07/06/2017    MG 2 1 07/05/2017     Lab Results   Component Value Date    WBC 12 23 (H) 05/01/2021    WBC 6 11 11/11/2015    RBC 4 92 05/01/2021    RBC 4 88 11/11/2015    HGB 14 9 05/01/2021    HGB 14 0 11/11/2015    HCT 43 3 05/01/2021    HCT 41 7 11/11/2015    MCV 88 05/01/2021    MCV 86 11/11/2015    MCH 30 3 05/01/2021    MCH 28 7 11/11/2015    RDW 12 4 05/01/2021    RDW 14 4 11/11/2015     05/01/2021     11/11/2015     NT-proBNP: No results for input(s): NTBNP in the last 72 hours     Coags:    Lipid Profile:   Lab Results   Component Value Date    CHOL 153 06/27/2015     Lab Results   Component Value Date    HDL 41 01/17/2023     Lab Results   Component Value Date    LDLCALC 114 (H) 01/17/2023     Lab Results   Component Value Date    TRIG 123 01/17/2023       Cardiac testing:   EKG reviewed personally:    2019-Normal sinus rhythm,  milliseconds  12/20-normal sinus rhythm,  milliseconds  Results for orders placed or performed in visit on 02/08/23   POCT ECG    Impression    Normal sinus rhythm, normal EKG         Loop recorder interrogation 6/4/2019 - 2 8% AFib burden, 2 episodes of AFib with RVR  Loop recorder interrogation 8/9/2019-0 1% AFib burden  Loop recorder interrogation 11/20-no AFib  Loop recorder extracted 4/21    Echocardiogram   2/18-LVEF 60%, grade 1 DD, trace MR with bowing of the anterior and posterior leaflets, very mild aortic root enlargement at 42 mm  1/20-EF normal, grade 1 diastolic dysfunction, mild-to-moderate MR, aortic root unchanged at 42 mm  1/22-personally reviewed-EF normal, moderate MR, bileaflet prolapse, unchanged aortic root at 41 mm  1/23-personally reviewed-EF normal, trivial to mild MR, no significant prolapse, aorta measuring 38 mm    Cardiac catheterization   5/17 - normal coronaries    Xiomara Garcia MD    Portions of the record may have been created with voice recognition software   Occasional wrong word or "sound a like" substitutions may have occurred due to the inherent limitations of voice recognition software   Read the chart carefully and recognize, using context, where substitutions have occurred

## 2023-03-23 ENCOUNTER — RA CDI HCC (OUTPATIENT)
Dept: OTHER | Facility: HOSPITAL | Age: 63
End: 2023-03-23

## 2023-03-23 NOTE — PROGRESS NOTES
[facility]  coding opportunities       Chart reviewed, no opportunity found: CHART REVIEWED, NO OPPORTUNITY FOUND        Patients Insurance        Commercial Insurance: [address] [place] [address]

## 2023-03-24 ENCOUNTER — OFFICE VISIT (OUTPATIENT)
Dept: GASTROENTEROLOGY | Facility: AMBULARY SURGERY CENTER | Age: 63
End: 2023-03-24

## 2023-03-24 VITALS
DIASTOLIC BLOOD PRESSURE: 70 MMHG | RESPIRATION RATE: 18 BRPM | WEIGHT: 190 LBS | OXYGEN SATURATION: 100 % | HEART RATE: 68 BPM | SYSTOLIC BLOOD PRESSURE: 116 MMHG | BODY MASS INDEX: 25.73 KG/M2 | HEIGHT: 72 IN

## 2023-03-24 DIAGNOSIS — K27.9 PEPTIC ULCER DISEASE: ICD-10-CM

## 2023-03-24 DIAGNOSIS — K21.9 GASTROESOPHAGEAL REFLUX DISEASE, UNSPECIFIED WHETHER ESOPHAGITIS PRESENT: Primary | ICD-10-CM

## 2023-03-24 RX ORDER — PANTOPRAZOLE SODIUM 40 MG/1
40 TABLET, DELAYED RELEASE ORAL DAILY
Qty: 90 TABLET | Refills: 1 | Status: SHIPPED | OUTPATIENT
Start: 2023-03-24

## 2023-03-24 NOTE — PROGRESS NOTES
Follow-up Note -  [facility]  Crystal Garcia 1960 65 y o  male         Reason: Follow-up; GERD    HPI: 41-year-old male with history of atrial fibrillation, stroke in 2017, anticoagulated with Eliquis who presents for follow-up; he last saw our service in 2021, initially following up from a recent episode of diverticulitis but also with complaints of globus sensation with swallowing, underwent EGD and colonoscopy in September 2021 showing a large antral ulcer, moderate gastritis, and several tubular adenomas removed from the colon for which he was recommended 3-year follow-up  He underwent follow-up EGD in December 2021 showing healing of the ulcer and only mild gastritis  Was advised about decreasing his PPI to once daily  At this time the patient says he has continued to take pantoprazole and Pepcid each once daily, both times in the morning  While he has been on the medications he has not experienced any further globus sensation or any other acid reflux or heartburn symptoms  He is feeling well  Denies any unexplained weight changes, any disturbances to his bowel habits or stool appearance, any blood or mucus in the stools  He says he has not tried to wean off of any of the medications, he has been faithful in taking them  He denies any use of NSAIDs at this time, though he says he used aspirin regularly prior to diagnosis of his gastric ulcer  REVIEW OF SYSTEMS:      CONSTITUTIONAL: Denies any fever, chills, or rigors  Good appetite, and no recent weight loss  HEENT: No earache or tinnitus  Denies hearing loss or visual disturbances  CARDIOVASCULAR: No chest pain or palpitations  RESPIRATORY: Denies any cough, hemoptysis, shortness of breath or dyspnea on exertion  GASTROINTESTINAL: As noted in the History of Present Illness  GENITOURINARY: No problems with urination  Denies any hematuria or dysuria  NEUROLOGIC: No dizziness or vertigo, denies headaches  MUSCULOSKELETAL: Denies any muscle or joint pain  SKIN: Denies skin rashes or itching  ENDOCRINE: Denies excessive thirst  Denies intolerance to heat or cold  PSYCHOSOCIAL: Denies depression or anxiety  Denies any recent memory loss  Past Medical History:   Diagnosis Date   • Allergic rhinitis    • Aortic root dilatation ([facility])    • Asthma    • Benign essential hypertension    • Cervical lymphadenopathy    • CKD (chronic kidney disease)    • CVA (cerebral vascular accident) [facility] )    • Diverticulitis of colon    • Elevated blood pressure reading     last assessed 01/08/14   • Erectile dysfunction of non-organic origin    • Fluttering heart    • Hypercalcemia     last assessed 05/19/16   • Hypertension    • Lacunar stroke [facility])    • Mitral valve prolapse    • Moderate mitral regurgitation    • PAF (paroxysmal atrial fibrillation) ([facility])    • Palpitations     last assessed 03/29/13   • Renal insufficiency     last assessed 09/23/13   • Sarcoidosis    • Sarcoidosis    • TIA (transient ischemic attack)       Past Surgical History:   Procedure Laterality Date   • CARDIAC CATHETERIZATION     • CATARACT EXTRACTION     • COLONOSCOPY     • EYE SURGERY     • INGUINAL HERNIA REPAIR     • NASAL SEPTUM SURGERY  1984    Septal Deviation Repair   • UT COLONOSCOPY FLX DX W/COLLJ SPEC WHEN PFRMD N/A 6/1/2017    Procedure: COLONOSCOPY;  Surgeon: Sayda Cox MD;  Location: [facility];   Service: Gastroenterology   • TONSILLECTOMY  1982   • TOOTH EXTRACTION       Social History     Socioeconomic History   • Marital status:      Spouse name: Not on file   • Number of children: Not on file   • Years of education: Not on file   • Highest education level: Not on file   Occupational History   • Occupation: currently works full time   Tobacco Use   • Smoking status: Never   • Smokeless tobacco: Never   Vaping Use   • Vaping Use: Never used   Substance and Sexual Activity   • Alcohol use: Not Currently     Comment: rare • Drug use: No   • Sexual activity: Not on file   Other Topics Concern   • Not on file   Social History Narrative    IADL's     Social Determinants of Health     Financial Resource Strain: Not on file   Food Insecurity: Not on file   Transportation Needs: Not on file   Physical Activity: Not on file   Stress: Not on file   Social Connections: Not on file   Intimate Partner Violence: Not on file   Housing Stability: Not on file     Family History   Problem Relation Age of Onset   • Heart failure Mother    • Diabetes Mother    • Hypertension Mother    • Cerebral aneurysm Father    • Diabetes Sister    • Heart attack Neg Hx    • Stroke Neg Hx    • Clotting disorder Neg Hx    • Hyperlipidemia Neg Hx      Patient has no known allergies    Current Outpatient Medications   Medication Sig Dispense Refill   • pantoprazole (PROTONIX) 40 mg tablet Take 1 tablet (40 mg total) by mouth daily 90 tablet 1   • apixaban (Eliquis) 5 mg Take 1 tablet (5 mg total) by mouth 2 (two) times a day 180 tablet 3   • Ascorbic Acid (VITAMIN C) 1000 MG tablet Take 1,000 Doses by mouth     • BIOTIN PO Take 1 capsule by mouth daily     • dofetilide (TIKOSYN) 500 mcg capsule Take 1 capsule (500 mcg total) by mouth every 12 (twelve) hours 180 capsule 3   • famotidine (PEPCID) 40 MG tablet Take 0 5 tablets (20 mg total) by mouth daily 30 tablet 5   • Ferrous Sulfate (IRON) 325 (65 Fe) MG TABS Take 1 Dose by mouth 3 (three) times a week QOD     • fluticasone (FLONASE) 50 mcg/act nasal spray 50 sprays into each nostril daily     • Glucosamine HCl (GLUCOSAMINE PO) Take 1 capsule by mouth     • lisinopril (ZESTRIL) 10 mg tablet Take 1 tablet (10 mg total) by mouth daily 90 tablet 3   • metoprolol succinate (TOPROL-XL) 25 mg 24 hr tablet Take 0 5 tablets (12 5 mg total) by mouth daily 45 tablet 3   • Multiple Vitamins-Minerals (PRESERVISION AREDS 2+MULTI VIT PO) Take by mouth     • sildenafil (REVATIO) 20 mg tablet Take 3-5 tabelts 1 hour prior to intercourse  30 tablet 1   • VITAMIN B COMPLEX-C PO Take 1 Dose by mouth     • vitamin E, tocopherol, 400 units capsule Take 1 Dose by mouth daily       No current facility-administered medications for this visit  Blood pressure 116/70, pulse 68, resp  rate 18, height 6' (1 829 m), weight 86 2 kg (190 lb), SpO2 100 %  PHYSICAL EXAM:      General Appearance:   Alert, cooperative, no distress, appears stated age    HEENT:   Normocephalic, atraumatic, anicteric      Neck:  Supple, symmetrical, trachea midline, no adenopathy;    thyroid: no enlargement/tenderness/nodules; no carotid  bruit or JVD    Lungs:   Clear to auscultation bilaterally; no rales, rhonchi or wheezing; respirations unlabored    Heart[de-identified]   S1 and S2 normal; regular rate and rhythm; no murmur, rub, or gallop  Abdomen:   Soft, non-tender, non-distended; normal bowel sounds; no masses, no organomegaly    Extremities: No edema, erythema, wounds, rashes   Rectal:   Deferred                      Lab Results   Component Value Date    WBC 12 23 (H) 05/01/2021    HGB 14 9 05/01/2021    HCT 43 3 05/01/2021    MCV 88 05/01/2021     05/01/2021     Lab Results   Component Value Date    GLUCOSE 81 11/11/2015    CALCIUM 8 8 07/29/2022     11/11/2015    K 4 0 07/29/2022    CO2 28 07/29/2022     07/29/2022    BUN 15 07/29/2022    CREATININE 1 23 07/29/2022     Lab Results   Component Value Date    ALT 24 01/12/2022    AST 16 01/12/2022    ALKPHOS 47 01/12/2022    BILITOT 0 68 11/11/2015     Lab Results   Component Value Date    INR 1 08 05/25/2017    INR 1 06 02/18/2017    PROTIME 14 0 05/25/2017    PROTIME 11 1 02/18/2017       No results found  ASSESSMENT & PLAN:    Gastroesophageal reflux disease  No evidence of Irving's or significant hiatal hernia at the time of EGD in 2021, no alarm symptoms at this time and patient has had good control of symptoms with use of pantoprazole and Pepcid regularly    May be a candidate for tapering off medications, he says he has made some positive changes in his diet and is no longer taking NSAIDs regularly    -I will renew his pantoprazole prescription at this time    -Advised patient he may try discontinuing the Pepcid and continuing pantoprazole for 3-4 more weeks    -If he continues to do well after such time, he can try taking the pantoprazole every other day for 3 or 4 more weeks, and then taper off the medication if he continues to do well beyond that point    -If he does experience recurrence of GERD symptomatology at any point during or after the tapering process outlined above, I advised that he resume daily PPI therapy, and we can continue routine follow-up    -Regarding his history of colon polyps he will be due for colonoscopy September 2024, I advised our office staff to set reminder    -I also advised patient about importance of dietary and lifestyle modification strategies in the mitigation of GERD

## 2023-03-24 NOTE — ASSESSMENT & PLAN NOTE
No evidence of Irving's or significant hiatal hernia at the time of EGD in 2021, no alarm symptoms at this time and patient has had good control of symptoms with use of pantoprazole and Pepcid regularly    May be a candidate for tapering off medications, he says he has made some positive changes in his diet and is no longer taking NSAIDs regularly    -I will renew his pantoprazole prescription at this time    -Advised patient he may try discontinuing the Pepcid and continuing pantoprazole for 3-4 more weeks    -If he continues to do well after such time, he can try taking the pantoprazole every other day for 3 or 4 more weeks, and then taper off the medication if he continues to do well beyond that point    -If he does experience recurrence of GERD symptomatology at any point during or after the tapering process outlined above, I advised that he resume daily PPI therapy, and we can continue routine follow-up    -Regarding his history of colon polyps he will be due for colonoscopy September 2024, I advised our office staff to set reminder    -I also advised patient about importance of dietary and lifestyle modification strategies in the mitigation of GERD

## 2023-03-30 ENCOUNTER — OFFICE VISIT (OUTPATIENT)
Dept: FAMILY MEDICINE CLINIC | Facility: CLINIC | Age: 63
End: 2023-03-30

## 2023-03-30 VITALS
RESPIRATION RATE: 18 BRPM | HEART RATE: 74 BPM | TEMPERATURE: 97.4 F | DIASTOLIC BLOOD PRESSURE: 80 MMHG | WEIGHT: 193.5 LBS | BODY MASS INDEX: 26.21 KG/M2 | SYSTOLIC BLOOD PRESSURE: 130 MMHG | OXYGEN SATURATION: 98 % | HEIGHT: 72 IN

## 2023-03-30 DIAGNOSIS — I10 BENIGN ESSENTIAL HYPERTENSION: ICD-10-CM

## 2023-03-30 DIAGNOSIS — K21.9 GASTROESOPHAGEAL REFLUX DISEASE WITHOUT ESOPHAGITIS: ICD-10-CM

## 2023-03-30 DIAGNOSIS — Z00.00 WELL ADULT EXAM: Primary | ICD-10-CM

## 2023-03-30 DIAGNOSIS — D86.9 SARCOIDOSIS: ICD-10-CM

## 2023-03-30 DIAGNOSIS — Z12.5 SCREENING FOR PROSTATE CANCER: ICD-10-CM

## 2023-03-30 DIAGNOSIS — I77.810 AORTIC ROOT DILATATION (HCC): ICD-10-CM

## 2023-03-30 DIAGNOSIS — I48.0 PAF (PAROXYSMAL ATRIAL FIBRILLATION) (HCC): ICD-10-CM

## 2023-03-30 DIAGNOSIS — I63.132 CEREBROVASCULAR ACCIDENT (CVA) DUE TO EMBOLISM OF LEFT CAROTID ARTERY (HCC): ICD-10-CM

## 2023-03-30 PROBLEM — Z86.010 HISTORY OF COLON POLYPS: Status: ACTIVE | Noted: 2023-03-30

## 2023-03-30 PROBLEM — Z86.0100 HISTORY OF COLON POLYPS: Status: ACTIVE | Noted: 2023-03-30

## 2023-03-30 PROBLEM — I63.81 LEFT SIDED LACUNAR STROKE (HCC): Status: RESOLVED | Noted: 2017-03-07 | Resolved: 2023-03-30

## 2023-03-30 PROBLEM — N18.9 CKD (CHRONIC KIDNEY DISEASE): Status: RESOLVED | Noted: 2017-02-19 | Resolved: 2023-03-30

## 2023-03-30 NOTE — PROGRESS NOTES
Name: Isidra Tolentino      : 1960      MRN: [id number]  Encounter Provider: Selwyn Seymour MD  Encounter Date: 3/30/2023   Encounter department: [address] [place] [address]  Well adult exam    2  Benign essential hypertension  -     CBC and differential  -     Comprehensive metabolic panel    3  PAF (paroxysmal atrial fibrillation) (HCC)  -     TSH, 3rd generation with Free T4 reflex    4  Cerebrovascular accident (CVA) due to embolism of left carotid artery ([address] )    5  Aortic root dilatation (HCC)    6  Sarcoidosis    7  Gastroesophageal reflux disease without esophagitis  -     Iron Panel (Includes Ferritin, Iron Sat%, Iron, and TIBC)    8  Screening for prostate cancer  -     PSA, Total Screen; Future    Continue with current medications  Repeat labs  OV 1 year     BMI Counseling: Body mass index is 26 24 kg/m²  The BMI is above normal  Nutrition recommendations include consuming healthier snacks, moderation in carbohydrate intake, reducing intake of saturated fat and trans fat and reducing intake of cholesterol  Exercise recommendations include exercising 3-5 times per week  Subjective     65year-old male here for Wellness exam   Medications reviewed  Hospitalizations/surgeries/SH/FH reviewed see note  Hypertension blood pressures have been stable on Metoprolol ER 25 mg 1/2 tablet daily and Lisinopril 10 mg daily  2022 creatinine 1 23  GFR 63  Electrolytes normal   S/p CVA not on statin therapy  2023 lipid profile cholesterol 180  Triglycerides 123  HDL 41         Lab Results   Component Value Date    SODIUM 138 2022    K 4 0 2022     2022    CO2 28 2022    BUN 15 2022    CREATININE 1 23 2022    GLUC 122 2021    CALCIUM 8 8 2022     Lab Results   Component Value Date    CHOLESTEROL 180 2023    CHOLESTEROL 174 2022    CHOLESTEROL 189 2022     Lab Results   Component Value Date    HDL 41 01/17/2023    HDL 40 07/29/2022    HDL 45 01/12/2022     Lab Results   Component Value Date    TRIG 123 01/17/2023    TRIG 107 07/29/2022    TRIG 123 01/12/2022     Lab Results   Component Value Date    LDLCALC 114 (H) 01/17/2023         Review of Systems   Constitutional: Positive for unexpected weight change (8 lb weight gain from 02/2023)  Negative for appetite change, chills and fever  HENT: Negative for congestion, ear pain, rhinorrhea, sore throat and trouble swallowing  Eyes: Negative for visual disturbance  Status post cataract surgery OS   Respiratory: Positive for apnea  Negative for cough, shortness of breath and wheezing  Sarcoidosis  No longer on Prednisone  Mild sleep apnea on previous sleep study not using CPAP at this time   Cardiovascular: Negative for chest pain, palpitations and leg swelling  Paroxysmal atrial fibrillation followed by Cardiology on Tikosyn and Eliquis  Loop recorder in place  02/2023 EKG NSR    01/2023  echocardiogram Left ventricular cavity size is normal  Wall thickness is mildly increased  There is borderline concentric hypertrophy  The left ventricular ejection fraction is 60%  Systolic function is normal   Wall motion is normal  Diastolic function is abnormal  Right Ventricle Right ventricular cavity size is normal  Systolic function is normal  Wall thickness is normal  Left Atrium The atrium is normal in size  Right Atrium The atrium is normal in size  Aortic Valve The aortic valve is trileaflet  The leaflets are not thickened  The leaflets are not calcified  The leaflets exhibit normal mobility  There is no evidence of regurgitation  The aortic valve has no significant stenosis  Mitral Valve Mitral valve structure is normal  There is trace regurgitation  There is no evidence of stenosis  Tricuspid Valve Tricuspid valve structure is normal  There is no evidence of regurgitation  There is no evidence of stenosis   Pulmonic Valve Pulmonic valve structure is normal  There is no evidence of regurgitation  There is no evidence of stenosis  Ascending AortaThe aortic root is normal in size  IVC/SVC The inferior vena cava is normal in size  Respirophasic changes were normal  Pericardium There is no pericardial effusion  The pericardium is normal in appearance  Gastrointestinal: Negative for abdominal pain, blood in stool, constipation, diarrhea, nausea and vomiting         05/2021 ER visit for diverticulitis  CT scan Extensive inflammatory stranding associated with acute sigmoid diverticulitis  No pericolonic abscess or pneumoperitoneum  Cholelithiasis  06/2021 colonoscopy  One 3 mm flat, adenomatous-appearing polyp in the descending colon  One 12 mm sessile, adenomatous-appearing polyp in the descending colon One 12 mm adenomatous-appearing, semi-pedunculated polyp in the sigmoid colon  Small, scattered diverticula in the descending colon and sigmoid colon  Small, internal hemorrhoids  GERD on Pantoprazole 40 mg every other day and Pepcid 20 mg/day  EGD 06/2021 Single large, cratered, round ulcer in the antrum with clean base (Don III)  Moderate, generalized edematous and erythematous mucosa in the antrum and prepyloric region  Pylorus was patent  Mild, generalized edematous and erythematous mucosa in the duodenal bulb  The middle third of the esophagus and lower third of the esophagus appeared normal  Repeat EGD 09/2021 Mild, patchy edematous and erythematous mucosa in the antrum  Pylorus was patent  The duodenal bulb, 1st part of the duodenum and 2nd part of the duodenum appeared normal           Endocrine: Negative for polydipsia and polyuria  Lab Results       Component                Value               Date                       [id number]             1 606               09/17/2020               Genitourinary: Negative for difficulty urinating         + ED  He uses prn generic Viagra          Lab Results       Component Value               Date                       PSA                      0 8                 09/17/2020                 PSA                      0 7                 05/15/2019                 PSA                      0 7                 05/16/2018                         Musculoskeletal: Negative for arthralgias, joint swelling and myalgias  Skin: Negative for rash  Allergic/Immunologic: Negative for environmental allergies  Neurological: Negative for dizziness and headaches  02/2017 status post CVA  No residual neurologic deficits  MRI brain small acute cortical infarction lateral posterior left frontal convexity not visible by CTA  No additional focal brain parenchymal abnormalities  Right maxillary sinus mucous retention cyst   Hematological: Negative for adenopathy  Does not bruise/bleed easily  Lab Results       Component                Value               Date                       WBC                      12 23 (H)           05/01/2021                 HGB                      14 9                05/01/2021                 HCT                      43 3                05/01/2021                 MCV                      88                  05/01/2021                 PLT                      199                 05/01/2021               Psychiatric/Behavioral: Negative for dysphoric mood and sleep disturbance         Past Medical History:   Diagnosis Date   • Allergic rhinitis    • Aortic root dilatation (HCC)    • Asthma    • Benign essential hypertension    • Cervical lymphadenopathy    • CKD (chronic kidney disease)    • CVA (cerebral vascular accident) ([facility] [address] )    • Diverticulitis of colon    • Elevated blood pressure reading     last assessed 01/08/14   • Erectile dysfunction of non-organic origin    • Fluttering heart    • Hypercalcemia     last assessed 05/19/16   • Hypertension    • Lacunar stroke [facility])    • Mitral valve prolapse    • Moderate mitral regurgitation    • PAF (paroxysmal atrial fibrillation) (HCC)    • Palpitations     last assessed 03/29/13   • Renal insufficiency     last assessed 09/23/13   • Sarcoidosis    • Sarcoidosis    • TIA (transient ischemic attack)      Past Surgical History:   Procedure Laterality Date   • CARDIAC CATHETERIZATION     • CATARACT EXTRACTION     • COLONOSCOPY     • EYE SURGERY     • INGUINAL HERNIA REPAIR     • NASAL SEPTUM SURGERY  1984    Septal Deviation Repair   • SD COLONOSCOPY FLX DX W/COLLJ SPEC WHEN PFRMD N/A 6/1/2017    Procedure: COLONOSCOPY;  Surgeon: Amanda Becerra MD;  Location: [facility];   Service: Gastroenterology   • TONSILLECTOMY  1982   • TOOTH EXTRACTION       Family History   Problem Relation Age of Onset   • Heart failure Mother    • Diabetes Mother    • Hypertension Mother    • Cerebral aneurysm Father    • Diabetes Sister    • Heart attack Neg Hx    • Stroke Neg Hx    • Clotting disorder Neg Hx    • Hyperlipidemia Neg Hx      Social History     Socioeconomic History   • Marital status:      Spouse name: None   • Number of children: None   • Years of education: None   • Highest education level: None   Occupational History   • Occupation: currently works full time   Tobacco Use   • Smoking status: Never   • Smokeless tobacco: Never   Vaping Use   • Vaping Use: Never used   Substance and Sexual Activity   • Alcohol use: Not Currently     Comment: rare   • Drug use: No   • Sexual activity: None   Other Topics Concern   • None   Social History Narrative    IADL's     Social Determinants of Health     Financial Resource Strain: Not on file   Food Insecurity: Not on file   Transportation Needs: Not on file   Physical Activity: Not on file   Stress: Not on file   Social Connections: Not on file   Intimate Partner Violence: Not on file   Housing Stability: Not on file     Current Outpatient Medications on File Prior to Visit   Medication Sig   • apixaban (Eliquis) 5 mg Take 1 tablet (5 mg total) by mouth 2 (two) times a day   • Ascorbic Acid (VITAMIN C) 1000 MG tablet Take 1,000 Doses by mouth   • BIOTIN PO Take 1 capsule by mouth daily   • dofetilide (TIKOSYN) 500 mcg capsule Take 1 capsule (500 mcg total) by mouth every 12 (twelve) hours   • famotidine (PEPCID) 40 MG tablet Take 0 5 tablets (20 mg total) by mouth daily   • Ferrous Sulfate (IRON) 325 (65 Fe) MG TABS Take 1 Dose by mouth 3 (three) times a week QOD   • fluticasone (FLONASE) 50 mcg/act nasal spray 50 sprays into each nostril daily   • Glucosamine HCl (GLUCOSAMINE PO) Take 1 capsule by mouth   • lisinopril (ZESTRIL) 10 mg tablet Take 1 tablet (10 mg total) by mouth daily   • metoprolol succinate (TOPROL-XL) 25 mg 24 hr tablet Take 0 5 tablets (12 5 mg total) by mouth daily   • Multiple Vitamins-Minerals (PRESERVISION AREDS 2+MULTI VIT PO) Take by mouth   • pantoprazole (PROTONIX) 40 mg tablet Take 1 tablet (40 mg total) by mouth daily   • VITAMIN B COMPLEX-C PO Take 1 Dose by mouth   • vitamin E, tocopherol, 400 units capsule Take 1 Dose by mouth daily   • sildenafil (REVATIO) 20 mg tablet Take 3-5 tabelts 1 hour prior to intercourse   (Patient not taking: Reported on 3/30/2023)     No Known Allergies  Immunization History   Administered Date(s) Administered   • COVID-19 Moderna Vac BIVALENT 12 Yr+ IM (BOOSTER ONLY) 0 5 ML 09/23/2022   • COVID-19 PFIZER VACCINE 0 3 ML IM 03/12/2021, 04/02/2021, 11/13/2021   • INFLUENZA 09/23/2022   • Influenza, injectable, quadrivalent, preservative free 0 5 mL 10/10/2018, 09/30/2019, 09/17/2020   • Influenza, recombinant, quadrivalent,injectable, preservative free 09/22/2021   • Influenza, seasonal, injectable 1960, 09/12/2013   • Pneumococcal Conjugate 13-Valent 05/15/2019   • Pneumococcal Polysaccharide PPV23 09/07/2013   • Tdap 08/01/2015, 02/15/2019   • Zoster Vaccine Recombinant 01/31/2022, 03/28/2022       Objective     /80 (BP Location: Left arm, Patient Position: Sitting, Cuff Size: Large)   Pulse 74   Temp (!) 97 4 °F (36 3 °C)   Resp 18   Ht 6' (1 829 m)   Wt 87 8 kg (193 lb 8 oz)   SpO2 98%   BMI 26 24 kg/m²     BP Readings from Last 3 Encounters:   03/30/23 130/80   03/24/23 116/70   02/08/23 122/82     Wt Readings from Last 3 Encounters:   03/30/23 87 8 kg (193 lb 8 oz)   03/24/23 86 2 kg (190 lb)   02/08/23 83 9 kg (185 lb)       Physical Exam  Vitals and nursing note reviewed  Constitutional:       General: He is not in acute distress  Appearance: He is well-developed  HENT:      Right Ear: Tympanic membrane and ear canal normal       Left Ear: Tympanic membrane and ear canal normal    Eyes:      General: No scleral icterus  Extraocular Movements: Extraocular movements intact  Conjunctiva/sclera: Conjunctivae normal       Pupils: Pupils are equal, round, and reactive to light  Neck:      Thyroid: No thyroid mass or thyromegaly  Vascular: No carotid bruit or JVD  Trachea: No tracheal deviation  Cardiovascular:      Rate and Rhythm: Normal rate and regular rhythm  Pulses:           Carotid pulses are 2+ on the right side and 2+ on the left side  Heart sounds: Normal heart sounds  No murmur heard  No gallop  Pulmonary:      Effort: Pulmonary effort is normal  No respiratory distress  Breath sounds: Normal breath sounds  No wheezing or rales  Abdominal:      General: Bowel sounds are normal  There is no distension or abdominal bruit  Palpations: Abdomen is soft  There is no hepatomegaly, splenomegaly or mass  Tenderness: There is no abdominal tenderness  There is no guarding or rebound  Musculoskeletal:      Right lower leg: No edema  Left lower leg: No edema  Comments: Mild tenderness/crepitus over L AC joint  Full ROM L shoulder    Lymphadenopathy:      Cervical: No cervical adenopathy  Upper Body:      Right upper body: No supraclavicular adenopathy  Left upper body: No supraclavicular adenopathy  Skin:     Findings: No rash  Nails: There is no clubbing  Neurological:      General: No focal deficit present  Mental Status: He is alert and oriented to person, place, and time     Psychiatric:         Mood and Affect: Mood normal          Behavior: Behavior normal        Jeanie Coronado MD

## 2023-03-31 ENCOUNTER — APPOINTMENT (OUTPATIENT)
Dept: LAB | Facility: CLINIC | Age: 63
End: 2023-03-31

## 2023-03-31 DIAGNOSIS — Z12.5 SCREENING FOR PROSTATE CANCER: ICD-10-CM

## 2023-03-31 LAB
ALBUMIN SERPL BCP-MCNC: 3.7 G/DL (ref 3.5–5)
ALP SERPL-CCNC: 43 U/L (ref 34–104)
ALT SERPL W P-5'-P-CCNC: 12 U/L (ref 7–52)
ANION GAP SERPL CALCULATED.3IONS-SCNC: 4 MMOL/L (ref 4–13)
AST SERPL W P-5'-P-CCNC: 14 U/L (ref 13–39)
BASOPHILS # BLD AUTO: 0.05 THOUSANDS/ÂΜL (ref 0–0.1)
BASOPHILS NFR BLD AUTO: 1 % (ref 0–1)
BILIRUB SERPL-MCNC: 0.91 MG/DL (ref 0.2–1)
BUN SERPL-MCNC: 18 MG/DL (ref 5–25)
CALCIUM SERPL-MCNC: 8.6 MG/DL (ref 8.4–10.2)
CHLORIDE SERPL-SCNC: 106 MMOL/L (ref 96–108)
CO2 SERPL-SCNC: 28 MMOL/L (ref 21–32)
CREAT SERPL-MCNC: 1.08 MG/DL (ref 0.6–1.3)
EOSINOPHIL # BLD AUTO: 0.16 THOUSAND/ÂΜL (ref 0–0.61)
EOSINOPHIL NFR BLD AUTO: 3 % (ref 0–6)
ERYTHROCYTE [DISTWIDTH] IN BLOOD BY AUTOMATED COUNT: 12.1 % (ref 11.6–15.1)
FERRITIN SERPL-MCNC: 97 NG/ML (ref 8–388)
GFR SERPL CREATININE-BSD FRML MDRD: 73 ML/MIN/1.73SQ M
GLUCOSE P FAST SERPL-MCNC: 91 MG/DL (ref 65–99)
HCT VFR BLD AUTO: 44.9 % (ref 36.5–49.3)
HGB BLD-MCNC: 15.1 G/DL (ref 12–17)
IMM GRANULOCYTES # BLD AUTO: 0.02 THOUSAND/UL (ref 0–0.2)
IMM GRANULOCYTES NFR BLD AUTO: 0 % (ref 0–2)
IRON SATN MFR SERPL: 47 % (ref 20–50)
IRON SERPL-MCNC: 96 UG/DL (ref 65–175)
LYMPHOCYTES # BLD AUTO: 1.66 THOUSANDS/ÂΜL (ref 0.6–4.47)
LYMPHOCYTES NFR BLD AUTO: 29 % (ref 14–44)
MCH RBC QN AUTO: 29.8 PG (ref 26.8–34.3)
MCHC RBC AUTO-ENTMCNC: 33.6 G/DL (ref 31.4–37.4)
MCV RBC AUTO: 89 FL (ref 82–98)
MONOCYTES # BLD AUTO: 0.69 THOUSAND/ÂΜL (ref 0.17–1.22)
MONOCYTES NFR BLD AUTO: 12 % (ref 4–12)
NEUTROPHILS # BLD AUTO: 3.25 THOUSANDS/ÂΜL (ref 1.85–7.62)
NEUTS SEG NFR BLD AUTO: 55 % (ref 43–75)
NRBC BLD AUTO-RTO: 0 /100 WBCS
PLATELET # BLD AUTO: 200 THOUSANDS/UL (ref 149–390)
PMV BLD AUTO: 10.3 FL (ref 8.9–12.7)
POTASSIUM SERPL-SCNC: 4.2 MMOL/L (ref 3.5–5.3)
PROT SERPL-MCNC: 5.9 G/DL (ref 6.4–8.4)
PSA SERPL-MCNC: 0.8 NG/ML (ref 0–4)
RBC # BLD AUTO: 5.06 MILLION/UL (ref 3.88–5.62)
SODIUM SERPL-SCNC: 138 MMOL/L (ref 135–147)
TIBC SERPL-MCNC: 206 UG/DL (ref 250–450)
TSH SERPL DL<=0.05 MIU/L-ACNC: 2.33 UIU/ML (ref 0.45–4.5)
WBC # BLD AUTO: 5.83 THOUSAND/UL (ref 4.31–10.16)

## 2023-07-27 ENCOUNTER — OFFICE VISIT (OUTPATIENT)
Dept: CARDIOLOGY CLINIC | Facility: CLINIC | Age: 63
End: 2023-07-27
Payer: COMMERCIAL

## 2023-07-27 VITALS
DIASTOLIC BLOOD PRESSURE: 78 MMHG | HEART RATE: 86 BPM | WEIGHT: 186 LBS | HEIGHT: 72 IN | BODY MASS INDEX: 25.19 KG/M2 | SYSTOLIC BLOOD PRESSURE: 120 MMHG | OXYGEN SATURATION: 96 %

## 2023-07-27 DIAGNOSIS — I48.0 PAF (PAROXYSMAL ATRIAL FIBRILLATION) (HCC): Primary | ICD-10-CM

## 2023-07-27 DIAGNOSIS — I10 ESSENTIAL HYPERTENSION: ICD-10-CM

## 2023-07-27 DIAGNOSIS — I10 BENIGN ESSENTIAL HYPERTENSION: ICD-10-CM

## 2023-07-27 DIAGNOSIS — I48.91 ATRIAL FIBRILLATION, UNSPECIFIED TYPE (HCC): ICD-10-CM

## 2023-07-27 PROCEDURE — 99214 OFFICE O/P EST MOD 30 MIN: CPT | Performed by: INTERNAL MEDICINE

## 2023-07-27 PROCEDURE — 93000 ELECTROCARDIOGRAM COMPLETE: CPT | Performed by: INTERNAL MEDICINE

## 2023-07-27 RX ORDER — METOPROLOL SUCCINATE 25 MG/1
12.5 TABLET, EXTENDED RELEASE ORAL DAILY
Qty: 45 TABLET | Refills: 3 | Status: SHIPPED | OUTPATIENT
Start: 2023-07-27

## 2023-07-27 RX ORDER — DOFETILIDE 0.5 MG/1
500 CAPSULE ORAL EVERY 12 HOURS
Qty: 180 CAPSULE | Refills: 3 | Status: SHIPPED | OUTPATIENT
Start: 2023-07-27

## 2023-07-27 RX ORDER — LISINOPRIL 10 MG/1
10 TABLET ORAL DAILY
Qty: 90 TABLET | Refills: 3 | Status: SHIPPED | OUTPATIENT
Start: 2023-07-27

## 2023-07-27 NOTE — PROGRESS NOTES
Follow-up - Cardiology   Greg Conner 65 y.o. male MRN: [id number]        Problems    1. PAF (paroxysmal atrial fibrillation) (HCC)  POCT ECG    apixaban (Eliquis) 5 mg      2. Atrial fibrillation, unspecified type (HCC)  dofetilide (TIKOSYN) 500 mcg capsule      3. Benign essential hypertension  lisinopril (ZESTRIL) 10 mg tablet      4. Essential hypertension  POCT ECG    metoprolol succinate (TOPROL-XL) 25 mg 24 hr tablet          Impression:    Blake Ely follows up with me at the [facility] office. Paroxysmal atrial fibrillation  Diagnosed in 2017 at the time of TIA  Treated with rhythm control with Tikosyn/Eliquis for secondary prevention, also on low-dose metoprolol  2 recurrent episodes, May 2023, June 2023, both lasting 10 to 15 minutes, symptomatic  No obvious triggers    Aortic root enlargement  Progressively smaller on recent echoes, 38 mm as of 1/23    Hypertension  Avoid HCTZ with Tikosyn  Otherwise lisinopril/metoprolol provides excellent control    Mitral regurgitation  Moderate with prolapse in the presence of A-fib in 2017, all echoes since then and revealed trivial to mild MR with minimal evidence of prolapse/bowing  He is active, playing baseball, denies any dyspnea    Plan:    No clear triggers for the recent episodes of A-fib, both short-lived, 1 episode in May, 1 episode in June, exam unchanged except for his very mild mitral click and short duration mitral murmur heard at the left axilla. If frequency increases, we recommended increasing Toprol to 25 mg daily. Would continue to uptitrate metoprolol for A-fib recurrence up to a dose of about 50 mg, but beyond that I would probably recommend visiting the option of ablation and consultation with EP if A-fib recurrence becomes more frequent. No need to update echo at this time, performed 1/30/2023.   6-month follow-up otherwise    HPI:   Greg Conner is a 65y.o. year old male with a history of embolic CVA who, paroxysmal atrial fibrillation, mitral valve prolapse, mitral valve regurgitation, hypertension, history of sarcoidosis, AMY, and 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 presents for a f/u    Started playing fast pitch baseball again, plated through his 01V from his youth, a friend asked him to come out and play again, he is enjoying it, he has no activity related symptoms  He had 2 episodes of A-fib, both lasting 10 to 15 minutes, May and June of this year, no triggers, no excessive alcohol use, no caffeine products, energy drinks etc.  Compliant with meds  EKG today unremarkable, in the presence of Tikosyn, normal QTc. Labs stable as of 3/23, normal electrolytes/potassium  Blood pressure is excellent  He did not have his lipids performed    Review of Systems   Constitutional: Negative for appetite change, diaphoresis, fatigue and fever. Respiratory: Negative for chest tightness, shortness of breath and wheezing. Cardiovascular: Positive for palpitations. Negative for chest pain and leg swelling. Gastrointestinal: Negative for abdominal pain and blood in stool. Musculoskeletal: Negative for arthralgias and joint swelling. Skin: Negative for rash. Neurological: Negative for dizziness, syncope and light-headedness.          Past Medical History:   Diagnosis Date   • Allergic rhinitis    • Aortic root dilatation (HCC)    • Asthma    • Benign essential hypertension    • Cervical lymphadenopathy    • CKD (chronic kidney disease)    • CVA (cerebral vascular accident) ([address])    • Diverticulitis of colon    • Elevated blood pressure reading     last assessed 01/08/14   • Erectile dysfunction of non-organic origin    • Fluttering heart    • Hypercalcemia     last assessed 05/19/16   • Hypertension    • Lacunar stroke [facility])    • Mitral valve prolapse    • Moderate mitral regurgitation    • PAF (paroxysmal atrial fibrillation) ([facility])    • Palpitations     last assessed 03/29/13   • Renal insufficiency     last assessed 09/23/13   • Sarcoidosis    • Sarcoidosis    • TIA (transient ischemic attack)      Social History     Substance and Sexual Activity   Alcohol Use Not Currently    Comment: rare     Social History     Substance and Sexual Activity   Drug Use No     Social History     Tobacco Use   Smoking Status Never   Smokeless Tobacco Never       Allergies:  No Known Allergies    Medications:     Current Outpatient Medications:   •  apixaban (Eliquis) 5 mg, Take 1 tablet (5 mg total) by mouth 2 (two) times a day, Disp: 180 tablet, Rfl: 3  •  Ascorbic Acid (VITAMIN C) 1000 MG tablet, Take 1,000 Doses by mouth, Disp: , Rfl:   •  BIOTIN PO, Take 1 capsule by mouth daily, Disp: , Rfl:   •  dofetilide (TIKOSYN) 500 mcg capsule, Take 1 capsule (500 mcg total) by mouth every 12 (twelve) hours, Disp: 180 capsule, Rfl: 3  •  famotidine (PEPCID) 40 MG tablet, Take 0.5 tablets (20 mg total) by mouth daily, Disp: 30 tablet, Rfl: 5  •  Ferrous Sulfate (IRON) 325 (65 Fe) MG TABS, Take 1 Dose by mouth 3 (three) times a week QOD, Disp: , Rfl:   •  fluticasone (FLONASE) 50 mcg/act nasal spray, 50 sprays into each nostril daily, Disp: , Rfl:   •  Glucosamine HCl (GLUCOSAMINE PO), Take 1 capsule by mouth, Disp: , Rfl:   •  lisinopril (ZESTRIL) 10 mg tablet, Take 1 tablet (10 mg total) by mouth daily, Disp: 90 tablet, Rfl: 3  •  metoprolol succinate (TOPROL-XL) 25 mg 24 hr tablet, Take 0.5 tablets (12.5 mg total) by mouth daily, Disp: 45 tablet, Rfl: 3  •  Multiple Vitamins-Minerals (PRESERVISION AREDS 2+MULTI VIT PO), Take by mouth, Disp: , Rfl:   •  pantoprazole (PROTONIX) 40 mg tablet, Take 1 tablet (40 mg total) by mouth daily, Disp: 90 tablet, Rfl: 1  •  VITAMIN B COMPLEX-C PO, Take 1 Dose by mouth, Disp: , Rfl:   •  vitamin E, tocopherol, 400 units capsule, Take 1 Dose by mouth daily, Disp: , Rfl:   •  sildenafil (REVATIO) 20 mg tablet, Take 3-5 tabelts 1 hour prior to intercourse.  (Patient not taking: Reported on 3/30/2023), Disp: 30 tablet, Rfl: 1      Vitals: 07/27/23 0751   BP: 120/78   Pulse: 86   SpO2: 96%     Weight (last 2 days)     Date/Time Weight    07/27/23 0751 84.4 (186)        Physical Exam  Constitutional:       General: He is not in acute distress. Appearance: He is not diaphoretic. HENT:      Head: Normocephalic and atraumatic. Eyes:      General: No scleral icterus. Conjunctiva/sclera: Conjunctivae normal.   Neck:      Vascular: No JVD. Cardiovascular:      Rate and Rhythm: Normal rate and regular rhythm. Heart sounds: Murmur ([place] in the left axilla) heard. Pulmonary:      Effort: Pulmonary effort is normal. No respiratory distress. Breath sounds: Normal breath sounds. No decreased breath sounds, wheezing, rhonchi or rales. Musculoskeletal:      Cervical back: Normal range of motion. Right lower leg: Normal. No edema. Left lower leg: Normal. No edema. Skin:     General: Skin is warm and dry. Neurological:      Mental Status: He is alert and oriented to person, place, and time.            Laboratory Studies:  Lab Results   Component Value Date    HGBA1C 5.3 05/15/2019    HGBA1C 5.3 06/30/2017    HGBA1C 5.2 02/20/2017     11/11/2015     06/27/2015     11/10/2014    K 4.2 03/31/2023    K 4.0 07/29/2022    K 4.4 01/12/2022    K 4.0 11/11/2015    K 4.1 06/27/2015    K 4.0 11/10/2014     03/31/2023     07/29/2022     01/12/2022     11/11/2015     06/27/2015     11/10/2014    CO2 28 03/31/2023    CO2 28 07/29/2022    CO2 28 01/12/2022    CO2 28 11/11/2015    CO2 28 06/27/2015    CO2 28 11/10/2014    GLUCOSE 81 11/11/2015    GLUCOSE 93 06/27/2015    GLUCOSE 93 11/10/2014    CREATININE 1.08 03/31/2023    CREATININE 1.23 07/29/2022    CREATININE 1.17 01/12/2022    CREATININE 1.26 11/11/2015    CREATININE 1.17 06/27/2015    CREATININE 1.13 11/10/2014    BUN 18 03/31/2023    BUN 15 07/29/2022    BUN 20 01/12/2022    BUN 19 11/11/2015    BUN 19 06/27/2015    BUN 15 11/10/2014    MG 1.9 09/08/2017    MG 2.2 07/06/2017    MG 2.1 07/05/2017     Lab Results   Component Value Date    WBC 5.83 03/31/2023    WBC 6.11 11/11/2015    RBC 5.06 03/31/2023    RBC 4.88 11/11/2015    HGB 15.1 03/31/2023    HGB 14.0 11/11/2015    HCT 44.9 03/31/2023    HCT 41.7 11/11/2015    MCV 89 03/31/2023    MCV 86 11/11/2015    MCH 29.8 03/31/2023    MCH 28.7 11/11/2015    RDW 12.1 03/31/2023    RDW 14.4 11/11/2015     03/31/2023     11/11/2015     NT-proBNP: No results for input(s): "NTBNP" in the last 72 hours.    Coags:    Lipid Profile:   Lab Results   Component Value Date    CHOL 153 06/27/2015     Lab Results   Component Value Date    HDL 41 01/17/2023     Lab Results   Component Value Date    LDLCALC 114 (H) 01/17/2023     Lab Results   Component Value Date    TRIG 123 01/17/2023       Cardiac testing:   EKG reviewed personally:    2019-Normal sinus rhythm,  milliseconds  12/20-normal sinus rhythm,  milliseconds  Results for orders placed or performed in visit on 07/27/23   POCT ECG    Impression    Sinus rhythm, normal EKG, normal QTc         Loop recorder interrogation 6/4/2019 - 2.8% AFib burden, 2 episodes of AFib with RVR  Loop recorder interrogation 8/9/2019-0.1% AFib burden  Loop recorder interrogation 11/20-no AFib  Loop recorder extracted 4/21    Echocardiogram   2/18-LVEF 60%, grade 1 DD, trace MR with bowing of the anterior and posterior leaflets, very mild aortic root enlargement at 42 mm  1/20-EF normal, grade 1 diastolic dysfunction, mild-to-moderate MR, aortic root unchanged at 42 mm  1/22-personally reviewed-EF normal, moderate MR, bileaflet prolapse, unchanged aortic root at 41 mm  1/23-personally reviewed-EF normal, trivial to mild MR, no significant prolapse, aorta measuring 38 mm    Cardiac catheterization   5/17 - normal coronaries    Elizabeth Buchanan MD    Portions of the record may have been created with voice recognition software.  Occasional wrong word or "sound a like" substitutions may have occurred due to the inherent limitations of voice recognition software.  Read the chart carefully and recognize, using context, where substitutions have occurred.

## 2023-08-03 ENCOUNTER — APPOINTMENT (OUTPATIENT)
Dept: LAB | Facility: CLINIC | Age: 63
End: 2023-08-03
Payer: COMMERCIAL

## 2023-08-03 DIAGNOSIS — I48.0 PAF (PAROXYSMAL ATRIAL FIBRILLATION) (HCC): ICD-10-CM

## 2023-08-03 LAB
ANION GAP SERPL CALCULATED.3IONS-SCNC: 5 MMOL/L
BUN SERPL-MCNC: 18 MG/DL (ref 5–25)
CALCIUM SERPL-MCNC: 8.4 MG/DL (ref 8.4–10.2)
CHLORIDE SERPL-SCNC: 106 MMOL/L (ref 96–108)
CHOLEST SERPL-MCNC: 169 MG/DL
CO2 SERPL-SCNC: 27 MMOL/L (ref 21–32)
CREAT SERPL-MCNC: 1.16 MG/DL (ref 0.6–1.3)
GFR SERPL CREATININE-BSD FRML MDRD: 67 ML/MIN/1.73SQ M
GLUCOSE P FAST SERPL-MCNC: 93 MG/DL (ref 65–99)
HDLC SERPL-MCNC: 41 MG/DL
LDLC SERPL CALC-MCNC: 114 MG/DL (ref 0–100)
NONHDLC SERPL-MCNC: 128 MG/DL
POTASSIUM SERPL-SCNC: 4.1 MMOL/L (ref 3.5–5.3)
SODIUM SERPL-SCNC: 138 MMOL/L (ref 135–147)
TRIGL SERPL-MCNC: 69 MG/DL

## 2023-08-03 PROCEDURE — 36415 COLL VENOUS BLD VENIPUNCTURE: CPT

## 2023-08-03 PROCEDURE — 80061 LIPID PANEL: CPT

## 2023-08-03 PROCEDURE — 80048 BASIC METABOLIC PNL TOTAL CA: CPT

## 2023-09-26 ENCOUNTER — ESTABLISHED COMPREHENSIVE EXAM (OUTPATIENT)
Dept: URBAN - METROPOLITAN AREA CLINIC 6 | Facility: CLINIC | Age: 63
End: 2023-09-26

## 2023-09-26 DIAGNOSIS — H43.392: ICD-10-CM

## 2023-09-26 DIAGNOSIS — Z96.1: ICD-10-CM

## 2023-09-26 DIAGNOSIS — H26.492: ICD-10-CM

## 2023-09-26 DIAGNOSIS — H35.362: ICD-10-CM

## 2023-09-26 DIAGNOSIS — H25.811: ICD-10-CM

## 2023-09-26 PROCEDURE — 92014 COMPRE OPH EXAM EST PT 1/>: CPT

## 2023-09-26 PROCEDURE — 92134 CPTRZ OPH DX IMG PST SGM RTA: CPT

## 2023-09-26 PROCEDURE — 92015 DETERMINE REFRACTIVE STATE: CPT | Mod: NC

## 2023-09-26 ASSESSMENT — VISUAL ACUITY
OS_GLARE: 20/50
OD_GLARE: 20/60
OD_CC: 20/20
OU_CC: J1+
OS_CC: 20/25

## 2023-09-26 ASSESSMENT — TONOMETRY
OS_IOP_MMHG: 13
OD_IOP_MMHG: 14

## 2023-10-18 ENCOUNTER — VBI (OUTPATIENT)
Dept: ADMINISTRATIVE | Facility: OTHER | Age: 63
End: 2023-10-18

## 2024-02-28 ENCOUNTER — OFFICE VISIT (OUTPATIENT)
Dept: CARDIOLOGY CLINIC | Facility: CLINIC | Age: 64
End: 2024-02-28
Payer: COMMERCIAL

## 2024-02-28 VITALS
OXYGEN SATURATION: 97 % | SYSTOLIC BLOOD PRESSURE: 122 MMHG | HEART RATE: 64 BPM | WEIGHT: 191 LBS | DIASTOLIC BLOOD PRESSURE: 78 MMHG | BODY MASS INDEX: 25.87 KG/M2 | HEIGHT: 72 IN

## 2024-02-28 DIAGNOSIS — D86.9 SARCOIDOSIS: ICD-10-CM

## 2024-02-28 DIAGNOSIS — I48.0 PAF (PAROXYSMAL ATRIAL FIBRILLATION) (HCC): Primary | ICD-10-CM

## 2024-02-28 DIAGNOSIS — I63.132 CEREBROVASCULAR ACCIDENT (CVA) DUE TO EMBOLISM OF LEFT CAROTID ARTERY (HCC): ICD-10-CM

## 2024-02-28 DIAGNOSIS — I77.810 AORTIC ROOT DILATATION (HCC): ICD-10-CM

## 2024-02-28 DIAGNOSIS — I10 BENIGN ESSENTIAL HYPERTENSION: ICD-10-CM

## 2024-02-28 PROCEDURE — 99214 OFFICE O/P EST MOD 30 MIN: CPT | Performed by: INTERNAL MEDICINE

## 2024-02-28 PROCEDURE — 93000 ELECTROCARDIOGRAM COMPLETE: CPT | Performed by: INTERNAL MEDICINE

## 2024-02-28 NOTE — PROGRESS NOTES
Follow-up - Cardiology   Roly Giles 63 y.o. male MRN: [id number]        Problems    1. PAF (paroxysmal atrial fibrillation) (HCC)  POCT ECG    AMB extended holter monitor      2. Aortic root dilatation (HCC)        3. Benign essential hypertension        4. Cerebrovascular accident (CVA) due to embolism of left carotid artery (HCC)        5. Sarcoidosis            Impression:    Herberth follows up with me at the [place] office.    Paroxysmal atrial fibrillation  Diagnosed in 2017 at the time of TIA  Continues on rhythm control with Tikosyn  QTc stable  On chronic Eliquis for secondary prevention  Also on low-dose metoprolol  Maintaining normal sinus rhythm at this time with occasional recurrence    Aortic root enlargement  38 mm as of 1/23, had measured slightly larger in years past  No additional imaging needed    Hypertension  Continue to recommend against HCTZ or thiazide diuretics in the context of Tikosyn  Blood pressure is well-controlled    Renal/pulmonary sarcoidosis  Treated with steroids initially  Not on any maintenance  Has no chronic symptoms  Disease seems quiescent    Mitral regurgitation  Moderate with prolapse in the presence of A-fib/RVR in 2017, all echo since have revealed trivial MR, last assessed 1/23    Plan:    Doubling his Toprol temporarily seems to be effective for any recurrent A-fib which thankfully remains relatively unusual for him  Stable on Tikosyn with a normal QTc of 439 ms.  He has quiescent pulmonary and renal sarcoid, I will recommend at least a Zio patch for screening for any possible cardiac sarcoid considering this has not been done before.  He does not have any clear evidence of any conduction disease, his echo last year did not show any wall thinning consistent with sarcoidosis.    HPI:   Roly Giles is a 63 y.o. 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  He recently got back into the game of baseball after being away from it for many years.  He has had a couple episodes of A-fib recurrence, they are short-lived, usually taking extra doses of metoprolol suffice.  He is on Tikosyn, QTc is normal at 439 ms.  He denies any significant cough, shortness of breath.  He is CKD 2 in the context of known renal sarcoid which remains dormant is stable with a creatinine of between 1.1 and 1.2.  He has no respiratory symptoms, no longer sees pulmonary, has no pulmonary symptoms and again pulmonary sarcoid seems dormant.  He has not been screened for cardiac sarcoid in the past, no recent cardiac monitoring.  He had an echo last year that showed normal LVEF and RV function, with trivial MR and borderline dilated aortic root at 38 mm.    Review of Systems   Constitutional:  Negative for appetite change, diaphoresis, fatigue and fever.   Respiratory:  Negative for chest tightness, shortness of breath and wheezing.    Cardiovascular:  Negative for chest pain, palpitations and leg swelling.   Gastrointestinal:  Negative for abdominal pain and blood in stool.   Musculoskeletal:  Negative for arthralgias and joint swelling.   Skin:  Negative for rash.   Neurological:  Negative for dizziness, syncope and light-headedness.         Past Medical History:   Diagnosis Date   • Allergic rhinitis    • Aortic root dilatation (HCC)    • Asthma    • Benign essential hypertension    • Cervical lymphadenopathy    • CKD (chronic kidney disease)    • CVA (cerebral vascular accident) (HCC)    • Diverticulitis of colon    • Elevated blood pressure reading     last assessed 01/08/14   • Erectile dysfunction of non-organic origin    • Fluttering heart    • Hypercalcemia     last assessed 05/19/16   • Hypertension    • Lacunar stroke (HCC)    • Mitral valve  prolapse    • Moderate mitral regurgitation    • PAF (paroxysmal atrial fibrillation) ([facility])    • Palpitations     last assessed 03/29/13   • Renal insufficiency     last assessed 09/23/13   • Sarcoidosis    • Sarcoidosis    • TIA (transient ischemic attack)      Social History     Substance and Sexual Activity   Alcohol Use Not Currently    Comment: rare     Social History     Substance and Sexual Activity   Drug Use No     Social History     Tobacco Use   Smoking Status Never   Smokeless Tobacco Never       Allergies:  No Known Allergies    Medications:     Current Outpatient Medications:   •  apixaban (Eliquis) 5 mg, Take 1 tablet (5 mg total) by mouth 2 (two) times a day, Disp: 180 tablet, Rfl: 3  •  Ascorbic Acid (VITAMIN C) 1000 MG tablet, Take 1,000 Doses by mouth, Disp: , Rfl:   •  BIOTIN PO, Take 1 capsule by mouth daily, Disp: , Rfl:   •  dofetilide (TIKOSYN) 500 mcg capsule, Take 1 capsule (500 mcg total) by mouth every 12 (twelve) hours, Disp: 180 capsule, Rfl: 3  •  famotidine (PEPCID) 40 MG tablet, Take 0.5 tablets (20 mg total) by mouth daily, Disp: 30 tablet, Rfl: 5  •  Ferrous Sulfate (IRON) 325 (65 Fe) MG TABS, Take 1 Dose by mouth 3 (three) times a week QOD, Disp: , Rfl:   •  fluticasone (FLONASE) 50 mcg/act nasal spray, 50 sprays into each nostril daily, Disp: , Rfl:   •  Glucosamine HCl (GLUCOSAMINE PO), Take 1 capsule by mouth, Disp: , Rfl:   •  lisinopril (ZESTRIL) 10 mg tablet, Take 1 tablet (10 mg total) by mouth daily, Disp: 90 tablet, Rfl: 3  •  metoprolol succinate (TOPROL-XL) 25 mg 24 hr tablet, Take 0.5 tablets (12.5 mg total) by mouth daily, Disp: 45 tablet, Rfl: 3  •  Multiple Vitamins-Minerals (PRESERVISION AREDS 2+MULTI VIT PO), Take by mouth, Disp: , Rfl:   •  pantoprazole (PROTONIX) 40 mg tablet, Take 1 tablet (40 mg total) by mouth daily, Disp: 90 tablet, Rfl: 1  •  VITAMIN B COMPLEX-C PO, Take 1 Dose by mouth, Disp: , Rfl:   •  vitamin E, tocopherol, 400 units capsule, Take 1  Dose by mouth daily, Disp: , Rfl:   •  sildenafil (REVATIO) 20 mg tablet, Take 3-5 tabelts 1 hour prior to intercourse. (Patient not taking: Reported on 3/30/2023), Disp: 30 tablet, Rfl: 1      Vitals:    02/28/24 0908   BP: 122/78   Pulse: 64   SpO2: 97%     Weight (last 2 days)     Date/Time Weight    02/28/24 0908 86.6 (191)        Physical Exam  Constitutional:       General: He is not in acute distress.     Appearance: He is not diaphoretic.   HENT:      Head: Normocephalic and atraumatic.   Eyes:      General: No scleral icterus.     Conjunctiva/sclera: Conjunctivae normal.   Neck:      Vascular: No JVD.   Cardiovascular:      Rate and Rhythm: Normal rate and regular rhythm.      Heart sounds: Normal heart sounds. No murmur heard.  Pulmonary:      Effort: Pulmonary effort is normal. No respiratory distress.      Breath sounds: Normal breath sounds. No decreased breath sounds, wheezing, rhonchi or rales.   Musculoskeletal:      Cervical back: Normal range of motion.      Right lower leg: Normal. No edema.      Left lower leg: Normal. No edema.   Skin:     General: Skin is warm and dry.   Neurological:      Mental Status: He is alert and oriented to person, place, and time.           Laboratory Studies:  Lab Results   Component Value Date    HGBA1C 5.3 05/15/2019    HGBA1C 5.3 06/30/2017    HGBA1C 5.2 02/20/2017     11/11/2015     06/27/2015     11/10/2014    K 4.1 08/03/2023    K 4.2 03/31/2023    K 4.0 07/29/2022    K 4.0 11/11/2015    K 4.1 06/27/2015    K 4.0 11/10/2014     08/03/2023     03/31/2023     07/29/2022     11/11/2015     06/27/2015     11/10/2014    CO2 27 08/03/2023    CO2 28 03/31/2023    CO2 28 07/29/2022    CO2 28 11/11/2015    CO2 28 06/27/2015    CO2 28 11/10/2014    GLUCOSE 81 11/11/2015    GLUCOSE 93 06/27/2015    GLUCOSE 93 11/10/2014    CREATININE 1.16 08/03/2023    CREATININE 1.08 03/31/2023    CREATININE 1.23 07/29/2022     "CREATININE 1.26 11/11/2015    CREATININE 1.17 06/27/2015    CREATININE 1.13 11/10/2014    BUN 18 08/03/2023    BUN 18 03/31/2023    BUN 15 07/29/2022    BUN 19 11/11/2015    BUN 19 06/27/2015    BUN 15 11/10/2014    MG 1.9 09/08/2017    MG 2.2 07/06/2017    MG 2.1 07/05/2017     Lab Results   Component Value Date    WBC 5.83 03/31/2023    WBC 6.11 11/11/2015    RBC 5.06 03/31/2023    RBC 4.88 11/11/2015    HGB 15.1 03/31/2023    HGB 14.0 11/11/2015    HCT 44.9 03/31/2023    HCT 41.7 11/11/2015    MCV 89 03/31/2023    MCV 86 11/11/2015    MCH 29.8 03/31/2023    MCH 28.7 11/11/2015    RDW 12.1 03/31/2023    RDW 14.4 11/11/2015     03/31/2023     11/11/2015     NT-proBNP: No results for input(s): \"NTBNP\" in the last 72 hours.   Coags:    Lipid Profile:   Lab Results   Component Value Date    CHOL 153 06/27/2015     Lab Results   Component Value Date    HDL 41 08/03/2023     Lab Results   Component Value Date    LDLCALC 114 (H) 08/03/2023     Lab Results   Component Value Date    TRIG 69 08/03/2023       Cardiac testing:   EKG reviewed personally:    2019-Normal sinus rhythm,  milliseconds  12/20-normal sinus rhythm,  milliseconds  Results for orders placed or performed in visit on 02/28/24   POCT ECG    Impression    Normal sinus rhythm, normal QTc         Echocardiogram   2/18-LVEF 60%, grade 1 DD, trace MR with bowing of the anterior and posterior leaflets, very mild aortic root enlargement at 42 mm  1/20-EF normal, grade 1 diastolic dysfunction, mild-to-moderate MR, aortic root unchanged at 42 mm  1/22--EF normal, moderate MR, bileaflet prolapse, unchanged aortic root at 41 mm  1/23-EF normal, trivial to mild MR, no significant prolapse, aorta measuring 38 mm    Cardiac catheterization   5/17 - normal coronaries    Yasir Casanova MD    Portions of the record may have been created with voice recognition software.  Occasional wrong word or \"sound a like\" substitutions may have occurred due to " the inherent limitations of voice recognition software.  Read the chart carefully and recognize, using context, where substitutions have occurred.

## 2024-03-26 ENCOUNTER — CLINICAL SUPPORT (OUTPATIENT)
Dept: CARDIOLOGY CLINIC | Facility: CLINIC | Age: 64
End: 2024-03-26
Payer: COMMERCIAL

## 2024-03-26 DIAGNOSIS — I48.0 PAF (PAROXYSMAL ATRIAL FIBRILLATION) (HCC): ICD-10-CM

## 2024-03-26 PROCEDURE — 93248 EXT ECG>7D<15D REV&INTERPJ: CPT | Performed by: INTERNAL MEDICINE

## 2024-04-02 ENCOUNTER — OFFICE VISIT (OUTPATIENT)
Dept: FAMILY MEDICINE CLINIC | Facility: CLINIC | Age: 64
End: 2024-04-02
Payer: COMMERCIAL

## 2024-04-02 ENCOUNTER — APPOINTMENT (OUTPATIENT)
Dept: LAB | Facility: CLINIC | Age: 64
End: 2024-04-02
Payer: COMMERCIAL

## 2024-04-02 VITALS
TEMPERATURE: 97.6 F | HEART RATE: 70 BPM | WEIGHT: 193 LBS | SYSTOLIC BLOOD PRESSURE: 122 MMHG | OXYGEN SATURATION: 98 % | BODY MASS INDEX: 26.14 KG/M2 | DIASTOLIC BLOOD PRESSURE: 74 MMHG | HEIGHT: 72 IN | RESPIRATION RATE: 18 BRPM

## 2024-04-02 DIAGNOSIS — I10 BENIGN ESSENTIAL HYPERTENSION: ICD-10-CM

## 2024-04-02 DIAGNOSIS — Z12.5 SCREENING FOR PROSTATE CANCER: ICD-10-CM

## 2024-04-02 DIAGNOSIS — R19.03 RIGHT LOWER QUADRANT ABDOMINAL MASS: ICD-10-CM

## 2024-04-02 DIAGNOSIS — I48.0 PAF (PAROXYSMAL ATRIAL FIBRILLATION) (HCC): ICD-10-CM

## 2024-04-02 DIAGNOSIS — I63.132 CEREBROVASCULAR ACCIDENT (CVA) DUE TO EMBOLISM OF LEFT CAROTID ARTERY (HCC): ICD-10-CM

## 2024-04-02 DIAGNOSIS — Z00.00 ANNUAL PHYSICAL EXAM: Primary | ICD-10-CM

## 2024-04-02 LAB
ALBUMIN SERPL BCP-MCNC: 4.1 G/DL (ref 3.5–5)
ALP SERPL-CCNC: 55 U/L (ref 34–104)
ALT SERPL W P-5'-P-CCNC: 11 U/L (ref 7–52)
ANION GAP SERPL CALCULATED.3IONS-SCNC: 5 MMOL/L (ref 4–13)
AST SERPL W P-5'-P-CCNC: 12 U/L (ref 13–39)
BILIRUB SERPL-MCNC: 0.83 MG/DL (ref 0.2–1)
BUN SERPL-MCNC: 17 MG/DL (ref 5–25)
CALCIUM SERPL-MCNC: 8.7 MG/DL (ref 8.4–10.2)
CHLORIDE SERPL-SCNC: 105 MMOL/L (ref 96–108)
CHOLEST SERPL-MCNC: 180 MG/DL
CO2 SERPL-SCNC: 28 MMOL/L (ref 21–32)
CREAT SERPL-MCNC: 1.15 MG/DL (ref 0.6–1.3)
ERYTHROCYTE [DISTWIDTH] IN BLOOD BY AUTOMATED COUNT: 12.3 % (ref 11.6–15.1)
GFR SERPL CREATININE-BSD FRML MDRD: 67 ML/MIN/1.73SQ M
GLUCOSE P FAST SERPL-MCNC: 96 MG/DL (ref 65–99)
HCT VFR BLD AUTO: 46.4 % (ref 36.5–49.3)
HDLC SERPL-MCNC: 36 MG/DL
HGB BLD-MCNC: 16 G/DL (ref 12–17)
LDLC SERPL CALC-MCNC: 119 MG/DL (ref 0–100)
MCH RBC QN AUTO: 30.2 PG (ref 26.8–34.3)
MCHC RBC AUTO-ENTMCNC: 34.5 G/DL (ref 31.4–37.4)
MCV RBC AUTO: 88 FL (ref 82–98)
NONHDLC SERPL-MCNC: 144 MG/DL
PLATELET # BLD AUTO: 208 THOUSANDS/UL (ref 149–390)
PMV BLD AUTO: 10.2 FL (ref 8.9–12.7)
POTASSIUM SERPL-SCNC: 4.5 MMOL/L (ref 3.5–5.3)
PROT SERPL-MCNC: 6.3 G/DL (ref 6.4–8.4)
PSA SERPL-MCNC: 1 NG/ML (ref 0–4)
RBC # BLD AUTO: 5.29 MILLION/UL (ref 3.88–5.62)
SODIUM SERPL-SCNC: 138 MMOL/L (ref 135–147)
T4 FREE SERPL-MCNC: 0.98 NG/DL (ref 0.61–1.12)
TRIGL SERPL-MCNC: 125 MG/DL
TSH SERPL DL<=0.05 MIU/L-ACNC: 2.17 UIU/ML (ref 0.45–4.5)
WBC # BLD AUTO: 6.33 THOUSAND/UL (ref 4.31–10.16)

## 2024-04-02 PROCEDURE — 36415 COLL VENOUS BLD VENIPUNCTURE: CPT

## 2024-04-02 PROCEDURE — 85027 COMPLETE CBC AUTOMATED: CPT

## 2024-04-02 PROCEDURE — 99396 PREV VISIT EST AGE 40-64: CPT | Performed by: FAMILY MEDICINE

## 2024-04-02 PROCEDURE — G0103 PSA SCREENING: HCPCS

## 2024-04-02 PROCEDURE — 80061 LIPID PANEL: CPT

## 2024-04-02 PROCEDURE — 84443 ASSAY THYROID STIM HORMONE: CPT

## 2024-04-02 PROCEDURE — 80053 COMPREHEN METABOLIC PANEL: CPT

## 2024-04-02 PROCEDURE — 99213 OFFICE O/P EST LOW 20 MIN: CPT | Performed by: FAMILY MEDICINE

## 2024-04-02 PROCEDURE — 84439 ASSAY OF FREE THYROXINE: CPT

## 2024-04-02 NOTE — PROGRESS NOTES
ADULT ANNUAL PHYSICAL  [facility] PRACTICE    NAME: Roly Giles  AGE: 63 y.o. SEX: male  : 1960     DATE: 2024     Assessment and Plan:     Problem List Items Addressed This Visit        Cardiovascular and Mediastinum    PAF (paroxysmal atrial fibrillation) (HCC)    Relevant Orders    TSH + Free T4    Benign essential hypertension    Relevant Orders    Comprehensive metabolic panel    CBC (Completed)    Cerebrovascular accident (CVA) (HCC)    Relevant Orders    Lipid panel   Other Visit Diagnoses     Annual physical exam    -  Primary    Screening for prostate cancer        Relevant Orders    PSA, Total Screen    Right lower quadrant abdominal mass        Relevant Orders    US abdominal wall        Follows with cardiology   Right lower quadrant mass suspect hernia.  Obtain abdominal ultrasound.  Patient will schedule this appointment.  Blood work listed above    Immunizations and preventive care screenings were discussed with patient today. Appropriate education was printed on patient's after visit summary.    Discussed risks and benefits of prostate cancer screening. We discussed the controversial history of PSA screening for prostate cancer in the [place] as well as the risk of over detection and over treatment of prostate cancer by way of PSA screening.  The patient understands that PSA blood testing is an imperfect way to screen for prostate cancer and that elevated PSA levels in the blood may also be caused by infection, inflammation, prostatic trauma or manipulation, urological procedures, or by benign prostatic enlargement.    The role of the digital rectal examination in prostate cancer screening was also discussed and I discussed with him that there is large interobserver variability in the findings of digital rectal examination.    Counseling:  Alcohol/drug use: discussed moderation in alcohol intake, the recommendations for healthy alcohol  use, and avoidance of illicit drug use.  Dental Health: discussed importance of regular tooth brushing, flossing, and dental visits.  Injury prevention: discussed safety/seat belts, safety helmets, smoke detectors, carbon dioxide detectors, and smoking near bedding or upholstery.  Sexual health: discussed sexually transmitted diseases, partner selection, use of condoms, avoidance of unintended pregnancy, and contraceptive alternatives.  Exercise: the importance of regular exercise/physical activity was discussed. Recommend exercise 3-5 times per week for at least 30 minutes.          Return in about 1 year (around 4/2/2025) for Annual Physical 30 minutes.     Chief Complaint:     Chief Complaint   Patient presents with   • Physical Exam      History of Present Illness:     Adult Annual Physical   Patient here for a comprehensive physical exam. The patient reports problems - Afib. Follows with cardiology  .    Diet and Physical Activity  Diet/Nutrition:  limited vegetables. Poor diet. Adequate fruits. Hot dogs, sandwiches .   Exercise: walking.      Depression Screening  PHQ-2/9 Depression Screening    Little interest or pleasure in doing things: 0 - not at all  Feeling down, depressed, or hopeless: 0 - not at all  PHQ-2 Score: 0  PHQ-2 Interpretation: Negative depression screen       General Health  Sleep: sleeps well.   Hearing: normal - bilateral.  Vision: most recent eye exam <1 year ago, wears glasses, and cataract from steroid use for sarcoidosis. Early Macular Degeneration. Follows with [facility] .   Dental: regular dental visits.        Health  Symptoms include: none         Review of Systems:     Review of Systems   Constitutional:  Negative for activity change, fatigue and fever.   Eyes:  Negative for visual disturbance.   Respiratory:  Negative for shortness of breath.    Cardiovascular:  Negative for chest pain.   Gastrointestinal:  Negative for abdominal pain, constipation, diarrhea and  nausea.        Right lower quadrant bulge.  Occasionally tender   Endocrine: Negative for cold intolerance and heat intolerance.   Musculoskeletal:  Negative for back pain.   Skin:  Negative for rash.   Neurological:  Negative for headaches.   Psychiatric/Behavioral:  Negative for confusion.       Past Medical History:     Past Medical History:   Diagnosis Date   • Allergic     Seasonal   • Allergic rhinitis    • Aortic root dilatation ([facility])    • Asthma    • Benign essential hypertension    • Cervical lymphadenopathy    • Chronic kidney disease    • CKD (chronic kidney disease)    • Coronary artery disease    • CVA (cerebral vascular accident) ([facility])    • Diverticulitis of colon    • Elevated blood pressure reading     last assessed 01/08/14   • Erectile dysfunction of non-organic origin    • Fluttering heart    • Hypercalcemia     last assessed 05/19/16   • Hypertension    • Lacunar stroke ([facility])    • Mitral valve prolapse    • Moderate mitral regurgitation    • PAF (paroxysmal atrial fibrillation) ([facility])    • Palpitations     last assessed 03/29/13   • Renal insufficiency     last assessed 09/23/13   • Sarcoidosis    • Sarcoidosis    • Shingles    • Stroke ([facility]) Feb 18 2017   • TIA (transient ischemic attack)       Past Surgical History:     Past Surgical History:   Procedure Laterality Date   • CARDIAC CATHETERIZATION     • CATARACT EXTRACTION     • COLONOSCOPY     • EYE SURGERY     • INGUINAL HERNIA REPAIR     • NASAL SEPTUM SURGERY  1984    Septal Deviation Repair   • TX COLONOSCOPY FLX DX W/COLLJ SPEC WHEN PFRMD N/A 6/1/2017    Procedure: COLONOSCOPY;  Surgeon: Polina Fine MD;  [facility];  Service: Gastroenterology   • TONSILLECTOMY  1982   • TOOTH EXTRACTION        Family History:     Family History   Problem Relation Age of Onset   • Heart failure Mother    • Diabetes Mother    • Hypertension Mother    • Cerebral aneurysm Father    • Diabetes Sister    • Heart attack Neg Hx    • Stroke Neg Hx     • Clotting disorder Neg Hx    • Hyperlipidemia Neg Hx       Social History:     Social History     Socioeconomic History   • Marital status:      Spouse name: None   • Number of children: None   • Years of education: None   • Highest education level: None   Occupational History   • Occupation: currently works full time   Tobacco Use   • Smoking status: Never   • Smokeless tobacco: Never   • Tobacco comments:     Never started, never used.   Vaping Use   • Vaping status: Never Used   Substance and Sexual Activity   • Alcohol use: Not Currently     Comment: rare   • Drug use: No   • Sexual activity: Yes     Partners: Female     Birth control/protection: Male Sterilization   Other Topics Concern   • None   Social History Narrative    IADL's     Social Determinants of Health     Financial Resource Strain: Not on file   Food Insecurity: Not on file   Transportation Needs: Not on file   Physical Activity: Not on file   Stress: Not on file   Social Connections: Not on file   Intimate Partner Violence: Not on file   Housing Stability: Not on file      Current Medications:     Current Outpatient Medications   Medication Sig Dispense Refill   • apixaban (Eliquis) 5 mg Take 1 tablet (5 mg total) by mouth 2 (two) times a day 180 tablet 3   • Ascorbic Acid (VITAMIN C) 1000 MG tablet Take 1,000 Doses by mouth     • BIOTIN PO Take 1 capsule by mouth daily     • dofetilide (TIKOSYN) 500 mcg capsule Take 1 capsule (500 mcg total) by mouth every 12 (twelve) hours 180 capsule 3   • famotidine (PEPCID) 40 MG tablet Take 0.5 tablets (20 mg total) by mouth daily 30 tablet 5   • Ferrous Sulfate (IRON) 325 (65 Fe) MG TABS Take 1 Dose by mouth 3 (three) times a week QOD     • fluticasone (FLONASE) 50 mcg/act nasal spray 50 sprays into each nostril daily     • Glucosamine HCl (GLUCOSAMINE PO) Take 1 capsule by mouth     • lisinopril (ZESTRIL) 10 mg tablet Take 1 tablet (10 mg total) by mouth daily 90 tablet 3   • metoprolol  succinate (TOPROL-XL) 25 mg 24 hr tablet Take 0.5 tablets (12.5 mg total) by mouth daily 45 tablet 3   • Multiple Vitamins-Minerals (PRESERVISION AREDS 2+MULTI VIT PO) Take by mouth     • pantoprazole (PROTONIX) 40 mg tablet Take 1 tablet (40 mg total) by mouth daily 90 tablet 1   • sildenafil (REVATIO) 20 mg tablet Take 3-5 tabelts 1 hour prior to intercourse. 30 tablet 1   • VITAMIN B COMPLEX-C PO Take 1 Dose by mouth     • vitamin E, tocopherol, 400 units capsule Take 1 Dose by mouth daily       No current facility-administered medications for this visit.      Allergies:     No Known Allergies   Physical Exam:     /74 (BP Location: Left arm, Patient Position: Sitting, Cuff Size: Standard)   Pulse 70   Temp 97.6 °F (36.4 °C) (Temporal)   Resp 18   Ht 6' (1.829 m)   Wt 87.5 kg (193 lb)   SpO2 98%   BMI 26.18 kg/m²     Physical Exam  Vitals and nursing note reviewed.   Constitutional:       Appearance: Normal appearance. He is well-developed.   HENT:      Head: Normocephalic and atraumatic.   Eyes:      Extraocular Movements: Extraocular movements intact.      Pupils: Pupils are equal, round, and reactive to light.   Cardiovascular:      Rate and Rhythm: Normal rate and regular rhythm.   Pulmonary:      Effort: Pulmonary effort is normal.      Breath sounds: Normal breath sounds.   Abdominal:      General: Bowel sounds are normal.      Palpations: Abdomen is soft.      Hernia: A hernia is present.   Musculoskeletal:      Cervical back: Normal range of motion.   Skin:     General: Skin is warm and dry.   Neurological:      General: No focal deficit present.      Mental Status: He is alert and oriented to person, place, and time.   Psychiatric:         Mood and Affect: Mood normal.         Speech: Speech normal.         Behavior: Behavior normal.          Monica Gaviria MD  [facility]

## 2024-04-15 ENCOUNTER — HOSPITAL ENCOUNTER (OUTPATIENT)
Dept: ULTRASOUND IMAGING | Facility: HOSPITAL | Age: 64
Discharge: HOME | End: 2024-04-15
Attending: FAMILY MEDICINE
Payer: COMMERCIAL

## 2024-04-15 DIAGNOSIS — R19.03 RIGHT LOWER QUADRANT ABDOMINAL MASS: ICD-10-CM

## 2024-04-15 PROCEDURE — 76705 ECHO EXAM OF ABDOMEN: CPT

## 2024-05-02 DIAGNOSIS — Z00.6 ENCOUNTER FOR EXAMINATION FOR NORMAL COMPARISON OR CONTROL IN CLINICAL RESEARCH PROGRAM: ICD-10-CM

## 2024-05-03 ENCOUNTER — APPOINTMENT (OUTPATIENT)
Dept: LAB | Facility: CLINIC | Age: 64
End: 2024-05-03

## 2024-05-03 DIAGNOSIS — Z00.6 ENCOUNTER FOR EXAMINATION FOR NORMAL COMPARISON OR CONTROL IN CLINICAL RESEARCH PROGRAM: ICD-10-CM

## 2024-05-03 PROCEDURE — 36415 COLL VENOUS BLD VENIPUNCTURE: CPT

## 2024-05-19 LAB
APOB+LDLR+PCSK9 GENE MUT ANL BLD/T: NOT DETECTED
BRCA1+BRCA2 DEL+DUP + FULL MUT ANL BLD/T: NOT DETECTED
MLH1+MSH2+MSH6+PMS2 GN DEL+DUP+FUL M: NOT DETECTED

## 2024-07-23 DIAGNOSIS — I10 BENIGN ESSENTIAL HYPERTENSION: ICD-10-CM

## 2024-07-24 RX ORDER — LISINOPRIL 10 MG/1
10 TABLET ORAL DAILY
Qty: 100 TABLET | Refills: 1 | Status: SHIPPED | OUTPATIENT
Start: 2024-07-24

## 2024-08-12 DIAGNOSIS — I48.0 PAF (PAROXYSMAL ATRIAL FIBRILLATION) (HCC): ICD-10-CM

## 2024-08-12 DIAGNOSIS — I10 ESSENTIAL HYPERTENSION: ICD-10-CM

## 2024-08-12 DIAGNOSIS — I48.91 ATRIAL FIBRILLATION, UNSPECIFIED TYPE (HCC): ICD-10-CM

## 2024-08-13 RX ORDER — APIXABAN 5 MG/1
5 TABLET, FILM COATED ORAL 2 TIMES DAILY
Qty: 180 TABLET | Refills: 1 | Status: SHIPPED | OUTPATIENT
Start: 2024-08-13

## 2024-08-13 RX ORDER — METOPROLOL SUCCINATE 25 MG/1
TABLET, EXTENDED RELEASE ORAL
Qty: 45 TABLET | Refills: 1 | Status: SHIPPED | OUTPATIENT
Start: 2024-08-13

## 2024-08-15 RX ORDER — DOFETILIDE 0.5 MG/1
500 CAPSULE ORAL EVERY 12 HOURS
Qty: 180 CAPSULE | Refills: 3 | Status: SHIPPED | OUTPATIENT
Start: 2024-08-15

## 2024-10-10 ENCOUNTER — CLINIC PROCEDURE ONLY (OUTPATIENT)
Dept: URBAN - METROPOLITAN AREA CLINIC 6 | Facility: CLINIC | Age: 64
End: 2024-10-10

## 2024-10-10 DIAGNOSIS — H26.492: ICD-10-CM

## 2024-10-10 PROCEDURE — 66821 AFTER CATARACT LASER SURGERY: CPT

## 2024-10-10 ASSESSMENT — VISUAL ACUITY
OD_CC: 20/25-2
OS_CC: 20/20

## 2024-10-10 ASSESSMENT — TONOMETRY
OS_IOP_MMHG: 10
OD_IOP_MMHG: 14

## 2024-11-01 ENCOUNTER — POST-OP CHECK (OUTPATIENT)
Dept: URBAN - METROPOLITAN AREA CLINIC 6 | Facility: CLINIC | Age: 64
End: 2024-11-01

## 2024-11-01 DIAGNOSIS — H43.392: ICD-10-CM

## 2024-11-01 DIAGNOSIS — H35.362: ICD-10-CM

## 2024-11-01 DIAGNOSIS — Z96.1: ICD-10-CM

## 2024-11-01 DIAGNOSIS — H25.811: ICD-10-CM

## 2024-11-01 PROCEDURE — 92250 FUNDUS PHOTOGRAPHY W/I&R: CPT

## 2024-11-01 PROCEDURE — 99024 POSTOP FOLLOW-UP VISIT: CPT

## 2024-11-01 ASSESSMENT — TONOMETRY
OD_IOP_MMHG: 13
OS_IOP_MMHG: 9

## 2024-11-01 ASSESSMENT — VISUAL ACUITY
OD_CC: 20/20
OS_CC: 20/20

## 2024-11-11 ENCOUNTER — TELEPHONE (OUTPATIENT)
Dept: FAMILY MEDICINE CLINIC | Facility: CLINIC | Age: 64
End: 2024-11-11

## 2024-11-11 NOTE — TELEPHONE ENCOUNTER
Left message for patient to call office back to reschedule appointment on 4/4/2025 with Dr Beaulieu. Provider will be changing to a part time schedule in the new year and is currently scheduling to June. Patient may change to one of our other providers in the office or remain with Dr Beaulieu, it will just be a longer wait to see him.

## 2024-12-24 ENCOUNTER — APPOINTMENT (OUTPATIENT)
Dept: LAB | Facility: CLINIC | Age: 64
End: 2024-12-24
Payer: COMMERCIAL

## 2024-12-24 ENCOUNTER — OFFICE VISIT (OUTPATIENT)
Dept: CARDIOLOGY CLINIC | Facility: CLINIC | Age: 64
End: 2024-12-24
Payer: COMMERCIAL

## 2024-12-24 VITALS
SYSTOLIC BLOOD PRESSURE: 122 MMHG | DIASTOLIC BLOOD PRESSURE: 70 MMHG | OXYGEN SATURATION: 98 % | HEART RATE: 66 BPM | WEIGHT: 191.4 LBS | BODY MASS INDEX: 25.96 KG/M2

## 2024-12-24 DIAGNOSIS — I48.0 PAF (PAROXYSMAL ATRIAL FIBRILLATION) (HCC): Primary | ICD-10-CM

## 2024-12-24 DIAGNOSIS — I10 ESSENTIAL HYPERTENSION: ICD-10-CM

## 2024-12-24 DIAGNOSIS — I10 BENIGN ESSENTIAL HYPERTENSION: ICD-10-CM

## 2024-12-24 DIAGNOSIS — I63.132 CEREBROVASCULAR ACCIDENT (CVA) DUE TO EMBOLISM OF LEFT CAROTID ARTERY (HCC): ICD-10-CM

## 2024-12-24 DIAGNOSIS — I77.810 AORTIC ROOT DILATATION (HCC): ICD-10-CM

## 2024-12-24 DIAGNOSIS — I48.0 PAF (PAROXYSMAL ATRIAL FIBRILLATION) (HCC): ICD-10-CM

## 2024-12-24 DIAGNOSIS — I48.91 ATRIAL FIBRILLATION, UNSPECIFIED TYPE (HCC): ICD-10-CM

## 2024-12-24 LAB
ANION GAP SERPL CALCULATED.3IONS-SCNC: 3 MMOL/L (ref 4–13)
BUN SERPL-MCNC: 22 MG/DL (ref 5–25)
CALCIUM SERPL-MCNC: 8.6 MG/DL (ref 8.4–10.2)
CHLORIDE SERPL-SCNC: 107 MMOL/L (ref 96–108)
CHOLEST SERPL-MCNC: 168 MG/DL (ref ?–200)
CO2 SERPL-SCNC: 29 MMOL/L (ref 21–32)
CREAT SERPL-MCNC: 1.14 MG/DL (ref 0.6–1.3)
ERYTHROCYTE [DISTWIDTH] IN BLOOD BY AUTOMATED COUNT: 12.4 % (ref 11.6–15.1)
GFR SERPL CREATININE-BSD FRML MDRD: 68 ML/MIN/1.73SQ M
GLUCOSE P FAST SERPL-MCNC: 98 MG/DL (ref 65–99)
HCT VFR BLD AUTO: 44.6 % (ref 36.5–49.3)
HDLC SERPL-MCNC: 39 MG/DL
HGB BLD-MCNC: 15.3 G/DL (ref 12–17)
LDLC SERPL CALC-MCNC: 110 MG/DL (ref 0–100)
MCH RBC QN AUTO: 30.6 PG (ref 26.8–34.3)
MCHC RBC AUTO-ENTMCNC: 34.3 G/DL (ref 31.4–37.4)
MCV RBC AUTO: 89 FL (ref 82–98)
NONHDLC SERPL-MCNC: 129 MG/DL
PLATELET # BLD AUTO: 193 THOUSANDS/UL (ref 149–390)
PMV BLD AUTO: 9.5 FL (ref 8.9–12.7)
POTASSIUM SERPL-SCNC: 4.4 MMOL/L (ref 3.5–5.3)
RBC # BLD AUTO: 5 MILLION/UL (ref 3.88–5.62)
SODIUM SERPL-SCNC: 139 MMOL/L (ref 135–147)
TRIGL SERPL-MCNC: 93 MG/DL (ref ?–150)
WBC # BLD AUTO: 6.05 THOUSAND/UL (ref 4.31–10.16)

## 2024-12-24 PROCEDURE — 80061 LIPID PANEL: CPT

## 2024-12-24 PROCEDURE — 80048 BASIC METABOLIC PNL TOTAL CA: CPT

## 2024-12-24 PROCEDURE — 36415 COLL VENOUS BLD VENIPUNCTURE: CPT

## 2024-12-24 PROCEDURE — 85027 COMPLETE CBC AUTOMATED: CPT

## 2024-12-24 PROCEDURE — 93000 ELECTROCARDIOGRAM COMPLETE: CPT | Performed by: INTERNAL MEDICINE

## 2024-12-24 PROCEDURE — 99214 OFFICE O/P EST MOD 30 MIN: CPT | Performed by: INTERNAL MEDICINE

## 2024-12-24 RX ORDER — LISINOPRIL 10 MG/1
10 TABLET ORAL DAILY
Qty: 100 TABLET | Refills: 3 | Status: SHIPPED | OUTPATIENT
Start: 2024-12-24

## 2024-12-24 RX ORDER — METOPROLOL SUCCINATE 25 MG/1
12.5 TABLET, EXTENDED RELEASE ORAL DAILY
Qty: 45 TABLET | Refills: 1 | Status: SHIPPED | OUTPATIENT
Start: 2024-12-24

## 2024-12-24 RX ORDER — DOFETILIDE 0.5 MG/1
500 CAPSULE ORAL EVERY 12 HOURS
Qty: 180 CAPSULE | Refills: 3 | Status: SHIPPED | OUTPATIENT
Start: 2024-12-24

## 2024-12-24 NOTE — PROGRESS NOTES
Follow-up - Cardiology   Roly Giles 64 y.o. male MRN: [id number]        Impression:      Paroxysmal atrial fibrillation  Diagnosed in 2017 at the time of TIA  Continues to do well on Tikosyn since then, and on Eliquis  He has had 3 episodes of A-fib upon awakening since my last visit 2/24, all resolved with an increased dose of metoprolol within an hour  He is satisfied with his current rhythm control/symptom burden    Aortic root enlargement  38 mm as of 1/23, had measured slightly larger in years past  No additional imaging needed at this time    Hypertension  Well-controlled    Renal/pulmonary sarcoidosis  Quiescent disease, with normal renal function, and no pulmonary symptoms with no regular follow-up with any specialist  Zio patch screening for cardiac sarcoid by arrhythmia monitoring was -3/24    Mitral regurgitation  Moderate with prolapse in the presence of A-fib/RVR in 2017  In the context of sinus rhythm and maintenance, there is only been trivial MR noted on subsequent echoes, last assessed 1/23    Plan:    Doing well on rhythm control  Medications refilled  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  He does have obstructive sleep apnea, did not tolerate or want a pursue CPAP after a month of attempts, has not used it in years, only had mild obstructive sleep apnea anyway.  Blood pressure is well-controlled  Denies any bleeding  Denies any lightheadedness or dizziness  Denies any  new strokelike symptoms  Zio patch 3/24 did not disclose any significant ventricular arrhythmias in the context of a man with extracardiac sarcoidosis which remains quiescent    Review of Systems   Constitutional:  Negative for appetite change, diaphoresis, fatigue and fever.   Respiratory:  Negative for chest tightness, shortness of breath and wheezing.    Cardiovascular:  Positive for palpitations. Negative for chest pain and leg swelling.   Gastrointestinal:  Negative for abdominal pain and blood in stool.   Musculoskeletal:  Negative for arthralgias and joint swelling.   Skin:  Negative for rash.   Neurological:  Negative for dizziness, syncope and light-headedness.         Past Medical History:   Diagnosis Date   • Allergic     Seasonal   • Allergic rhinitis    • Aortic root dilatation ([facility])    • Asthma    • Benign essential hypertension    • Cervical lymphadenopathy    • Chronic kidney disease    • CKD (chronic kidney disease)    • Coronary artery disease    • CVA (cerebral vascular accident) ([facility])    • Diverticulitis of colon    • Elevated blood pressure reading     last assessed 01/08/14   • Erectile dysfunction of non-organic origin    • Fluttering heart    • Hypercalcemia     last assessed 05/19/16   • Hypertension    • Lacunar stroke ([facility])    • Mitral valve prolapse    • Moderate mitral regurgitation    • PAF (paroxysmal atrial fibrillation) ([facility])    • Palpitations     last assessed 03/29/13   • Renal insufficiency     last assessed 09/23/13   • Sarcoidosis    • Sarcoidosis    • Shingles    • Stroke ([facility]) Feb 18 2017   • TIA (transient ischemic attack)      Social History     Substance and Sexual Activity   Alcohol Use Not Currently    Comment: rare     Social History     Substance and Sexual Activity   Drug Use No     Social History     Tobacco Use   Smoking Status Never   Smokeless Tobacco Never   Tobacco Comments    Never started, never used.       Allergies:  No Known Allergies    Medications:      Current Outpatient Medications:   •  apixaban (Eliquis) 5 mg, Take 1 tablet (5 mg total) by mouth 2 (two) times a day, Disp: 180 tablet, Rfl: 3  •  Ascorbic Acid (VITAMIN C) 1000 MG tablet, Take 1,000 Doses by mouth, Disp: , Rfl:   •  BIOTIN PO, Take 1 capsule by mouth daily, Disp: , Rfl:   •  dofetilide (TIKOSYN) 500 mcg capsule, Take 1 capsule (500 mcg total) by mouth every 12 (twelve) hours, Disp: 180 capsule, Rfl: 3  •  famotidine (PEPCID) 40 MG tablet, Take 0.5 tablets (20 mg total) by mouth daily, Disp: 30 tablet, Rfl: 5  •  Ferrous Sulfate (IRON) 325 (65 Fe) MG TABS, Take 1 Dose by mouth 3 (three) times a week QOD, Disp: , Rfl:   •  fluticasone (FLONASE) 50 mcg/act nasal spray, 50 sprays into each nostril daily, Disp: , Rfl:   •  Glucosamine HCl (GLUCOSAMINE PO), Take 1 capsule by mouth, Disp: , Rfl:   •  lisinopril (ZESTRIL) 10 mg tablet, Take 1 tablet (10 mg total) by mouth daily, Disp: 100 tablet, Rfl: 3  •  metoprolol succinate (TOPROL-XL) 25 mg 24 hr tablet, Take 0.5 tablets (12.5 mg total) by mouth daily, Disp: 45 tablet, Rfl: 1  •  Multiple Vitamins-Minerals (PRESERVISION AREDS 2+MULTI VIT PO), Take by mouth, Disp: , Rfl:   •  VITAMIN B COMPLEX-C PO, Take 1 Dose by mouth, Disp: , Rfl:   •  vitamin E, tocopherol, 400 units capsule, Take 1 Dose by mouth daily, Disp: , Rfl:   •  pantoprazole (PROTONIX) 40 mg tablet, Take 1 tablet (40 mg total) by mouth daily (Patient not taking: Reported on 12/24/2024), Disp: 90 tablet, Rfl: 1  •  sildenafil (REVATIO) 20 mg tablet, Take 3-5 tabelts 1 hour prior to intercourse. (Patient not taking: Reported on 12/24/2024), Disp: 30 tablet, Rfl: 1      Vitals:    12/24/24 0751   BP: 122/70   Pulse: 66   SpO2: 98%     Weight (last 2 days)     Date/Time Weight    12/24/24 0751 86.8 (191.4)        Physical Exam  Constitutional:       General: He is not in acute distress.     Appearance: He is not diaphoretic.   HENT:      Head: Normocephalic and atraumatic.   Eyes:       General: No scleral icterus.     Conjunctiva/sclera: Conjunctivae normal.   Neck:      Vascular: No JVD.   Cardiovascular:      Rate and Rhythm: Normal rate and regular rhythm.      Heart sounds: Normal heart sounds. No murmur heard.  Pulmonary:      Effort: Pulmonary effort is normal. No respiratory distress.      Breath sounds: Normal breath sounds. No decreased breath sounds, wheezing, rhonchi or rales.   Musculoskeletal:      Cervical back: Normal range of motion.      Right lower leg: Normal. No edema.      Left lower leg: Normal. No edema.   Skin:     General: Skin is warm and dry.   Neurological:      Mental Status: He is alert and oriented to person, place, and time.           Laboratory Studies:  Lab Results   Component Value Date    HGBA1C 5.3 05/15/2019    HGBA1C 5.3 06/30/2017    HGBA1C 5.2 02/20/2017     11/11/2015     06/27/2015     11/10/2014    K 4.4 12/24/2024    K 4.5 04/02/2024    K 4.1 08/03/2023    K 4.0 11/11/2015    K 4.1 06/27/2015    K 4.0 11/10/2014     12/24/2024     04/02/2024     08/03/2023     11/11/2015     06/27/2015     11/10/2014    CO2 29 12/24/2024    CO2 28 04/02/2024    CO2 27 08/03/2023    CO2 28 11/11/2015    CO2 28 06/27/2015    CO2 28 11/10/2014    GLUCOSE 81 11/11/2015    GLUCOSE 93 06/27/2015    GLUCOSE 93 11/10/2014    CREATININE 1.14 12/24/2024    CREATININE 1.15 04/02/2024    CREATININE 1.16 08/03/2023    CREATININE 1.26 11/11/2015    CREATININE 1.17 06/27/2015    CREATININE 1.13 11/10/2014    BUN 22 12/24/2024    BUN 17 04/02/2024    BUN 18 08/03/2023    BUN 19 11/11/2015    BUN 19 06/27/2015    BUN 15 11/10/2014    MG 1.9 09/08/2017    MG 2.2 07/06/2017    MG 2.1 07/05/2017     Lab Results   Component Value Date    WBC 6.05 12/24/2024    WBC 6.11 11/11/2015    RBC 5.00 12/24/2024    RBC 4.88 11/11/2015    HGB 15.3 12/24/2024    HGB 14.0 11/11/2015    HCT 44.6 12/24/2024    HCT 41.7 11/11/2015    MCV 89 12/24/2024  "   MCV 86 11/11/2015    MCH 30.6 12/24/2024    MCH 28.7 11/11/2015    RDW 12.4 12/24/2024    RDW 14.4 11/11/2015     12/24/2024     11/11/2015     NT-proBNP: No results for input(s): \"NTBNP\" in the last 72 hours.   Coags:    Lipid Profile:   Lab Results   Component Value Date    CHOL 153 06/27/2015     Lab Results   Component Value Date    HDL 39 (L) 12/24/2024     Lab Results   Component Value Date    LDLCALC 110 (H) 12/24/2024     Lab Results   Component Value Date    TRIG 93 12/24/2024       Cardiac testing:   EKG reviewed personally:    Results for orders placed or performed in visit on 12/24/24   POCT ECG    Impression    Normal sinus rhythm, normal EKG, normal QTc on Tikosyn           Echocardiogram   2/18-LVEF 60%, grade 1 DD, trace MR with bowing of the anterior and posterior leaflets, very mild aortic root enlargement at 42 mm  1/20-EF normal, grade 1 diastolic dysfunction, mild-to-moderate MR, aortic root unchanged at 42 mm  1/22--EF normal, moderate MR, bileaflet prolapse, unchanged aortic root at 41 mm  1/23-EF normal, trivial to mild MR, no significant prolapse, aorta measuring 38 mm    Cardiac catheterization   5/17 - normal coronaries    Yasir Casanova MD    Portions of the record may have been created with voice recognition software.  Occasional wrong word or \"sound a like\" substitutions may have occurred due to the inherent limitations of voice recognition software.  Read the chart carefully and recognize, using context, where substitutions have occurred.  "

## 2025-04-04 ENCOUNTER — APPOINTMENT (OUTPATIENT)
Dept: LAB | Facility: CLINIC | Age: 65
End: 2025-04-04
Payer: COMMERCIAL

## 2025-04-04 ENCOUNTER — OFFICE VISIT (OUTPATIENT)
Dept: FAMILY MEDICINE CLINIC | Facility: CLINIC | Age: 65
End: 2025-04-04
Payer: COMMERCIAL

## 2025-04-04 VITALS
DIASTOLIC BLOOD PRESSURE: 70 MMHG | OXYGEN SATURATION: 97 % | RESPIRATION RATE: 16 BRPM | HEIGHT: 72 IN | SYSTOLIC BLOOD PRESSURE: 122 MMHG | TEMPERATURE: 98.2 F | BODY MASS INDEX: 25.47 KG/M2 | WEIGHT: 188 LBS | HEART RATE: 65 BPM

## 2025-04-04 DIAGNOSIS — L25.9 CONTACT DERMATITIS, UNSPECIFIED CONTACT DERMATITIS TYPE, UNSPECIFIED TRIGGER: ICD-10-CM

## 2025-04-04 DIAGNOSIS — D22.9 NEVUS: ICD-10-CM

## 2025-04-04 DIAGNOSIS — Z12.5 SCREENING FOR PROSTATE CANCER: ICD-10-CM

## 2025-04-04 DIAGNOSIS — I48.0 PAF (PAROXYSMAL ATRIAL FIBRILLATION) (HCC): ICD-10-CM

## 2025-04-04 DIAGNOSIS — Z00.00 ANNUAL PHYSICAL EXAM: Primary | ICD-10-CM

## 2025-04-04 LAB — PSA SERPL-MCNC: 0.97 NG/ML (ref 0–4)

## 2025-04-04 PROCEDURE — 99396 PREV VISIT EST AGE 40-64: CPT

## 2025-04-04 PROCEDURE — 36415 COLL VENOUS BLD VENIPUNCTURE: CPT

## 2025-04-04 PROCEDURE — 99214 OFFICE O/P EST MOD 30 MIN: CPT

## 2025-04-04 PROCEDURE — G0103 PSA SCREENING: HCPCS

## 2025-04-04 RX ORDER — HYDROCORTISONE 25 MG/G
CREAM TOPICAL 3 TIMES DAILY
Qty: 28 G | Refills: 1 | Status: SHIPPED | OUTPATIENT
Start: 2025-04-04

## 2025-04-04 NOTE — PROGRESS NOTES
Adult Annual Physical  Name: Roly Giles      : 1960      MRN: [id number]  Encounter Provider: Keely Pederson DO  Encounter Date: 2025   Encounter department: [facility]    :  Assessment & Plan  Annual physical exam  - screening for cardiovascular disease: last labs 2024 reviewed, lipid panel/bmp/cbc stable  - screening for colon cancer: last colonoscopy 2021, repeat recommended in 3 years due to personal hx of colon polyps -patient has follow-up with GI already scheduled  - immunizations: UTD  - psa: due now, ordered now  - smoking hx:  never smoker  - counseled pt on lifestyle modifications such as diet changes and 150 mins/weekly of moderate intensity exercise        PAF (paroxysmal atrial fibrillation) (HCC)  Stable, follows with cardiology.  RRR on exam today. considering ablation next year       Screening for prostate cancer    Orders:    PSA, Total Screen; Future    Nevus  1 cm x 1 cm macular lesion on left lateral thigh the patient reports to be growing, nontender.  Family history of skin cancer in grandfather.  Patient wears SPF and UV protective clothing often.   Orders:    Ambulatory Referral to Dermatology; Future    Contact dermatitis, unspecified contact dermatitis type, unspecified trigger  Recommend hydrocortisone cream 2-3 times a day x 2 weeks to affected area, lock-in with emollient or petroleum jelly.  Continue supportive care with use of washcloths, hypoallergenic soaps, moisturizer, etc. return if worsening symptoms  Orders:    hydrocortisone 2.5 % cream; Apply topically 3 (three) times a day        Preventive Screenings:  - Diabetes Screening: screening up-to-date  - Cholesterol Screening: screening up-to-date   - Hepatitis C screening: screening up-to-date   - Colon cancer screening: risks/benefits discussed   - Lung cancer screening: screening not indicated   - Prostate cancer screening: risks/benefits discussed and orders placed     Patient has  scheduled follow-up with GI for colonoscopy     Counseling/Anticipatory Guidance:    - Dental health: discussed importance of regular tooth brushing, flossing, and dental visits.   - Diet: discussed recommendations for a healthy/well-balanced diet.   - Exercise: the importance of regular exercise/physical activity was discussed. Recommend exercise 3-5 times per week for at least 30 minutes.   - Injury prevention: discussed safety/seat belts, safety helmets, smoke detectors, carbon monoxide detectors, and smoking near bedding or upholstery.      This is both a Annual Physical and problem-focused visit as agreed by the patient.  Chronic conditions stable.  Previous labs reviewed.  Continue current medications.  Orders as above      History of Present Illness     Adult Annual Physical:  Patient presents for annual physical. Patient here for annual physical with concerns regarding a mole on his leg has gotten bigger, blood vessel on his leg and a rash on his gluteal cleft that comes and goes. Sometimes flaking skin and uses exfoliating scrub at those time.  He has not used anything else.  No new changes in soaps or detergents.  Does report that the toilet paper at his work is more abrasive than at home.  Does not use any creams or lotions on his skin.    Reports a history of this lump on his right lower abdomen that is more noticeable when he palpates deeply, denies any growth.  Had an ultrasound 4/2024 that was unremarkable.  Denies any changes in growth or pain.  Will continue to monitor for now.    Also reports of a mole on left upper thigh that he thinks has grown in size but does not look any darker than previous.  Family history of skin cancer in his grandfather but does report grandfather use it can often.  .     Diet and Physical Activity:  - Diet/Nutrition: no special diet and limited junk food.  - Exercise: walking, strength training exercises, 5-7 times a week on average and less than 30 minutes on average.  goal 10k steps    Depression Screening:  - PHQ-2 Score: 0    General Health:  - Sleep: sleeps well, 4-6 hours of sleep on average and 7-8 hours of sleep on average.  - Hearing: normal hearing right ear, normal hearing left ear and normal hearing bilateral ears.  - Vision: no vision problems, most recent eye exam < 1 year ago and wears glasses.  - Dental: regular dental visits, brushes teeth twice daily and floss regularly.     Health:  - History of STDs: no.   - Urinary symptoms: none and erectile dysfunction.     Advanced Care Planning:  - Has an advanced directive?: no    - Has a durable medical POA?: no      Review of Systems   Constitutional:  Negative for chills and fever.   HENT:  Negative for congestion and rhinorrhea.    Eyes:  Negative for visual disturbance.   Respiratory:  Negative for cough and shortness of breath.    Cardiovascular:  Negative for chest pain and palpitations.   Gastrointestinal:  Negative for abdominal pain, constipation, diarrhea, nausea and vomiting.   Genitourinary:  Negative for dysuria.   Musculoskeletal:  Negative for arthralgias.   Skin:  Positive for rash.   Neurological:  Negative for dizziness, light-headedness and headaches.     Current Outpatient Medications on File Prior to Visit   Medication Sig Dispense Refill    apixaban (Eliquis) 5 mg Take 1 tablet (5 mg total) by mouth 2 (two) times a day 180 tablet 3    Ascorbic Acid (VITAMIN C) 1000 MG tablet Take 1,000 Doses by mouth      BIOTIN PO Take 1 capsule by mouth daily      dofetilide (TIKOSYN) 500 mcg capsule Take 1 capsule (500 mcg total) by mouth every 12 (twelve) hours 180 capsule 3    famotidine (PEPCID) 40 MG tablet Take 0.5 tablets (20 mg total) by mouth daily 30 tablet 5    Ferrous Sulfate (IRON) 325 (65 Fe) MG TABS Take 1 Dose by mouth 3 (three) times a week QOD      fluticasone (FLONASE) 50 mcg/act nasal spray 50 sprays into each nostril daily      Glucosamine HCl (GLUCOSAMINE PO) Take 1 capsule by mouth       lisinopril (ZESTRIL) 10 mg tablet Take 1 tablet (10 mg total) by mouth daily 100 tablet 3    metoprolol succinate (TOPROL-XL) 25 mg 24 hr tablet Take 0.5 tablets (12.5 mg total) by mouth daily 45 tablet 1    Multiple Vitamins-Minerals (PRESERVISION AREDS 2+MULTI VIT PO) Take by mouth      VITAMIN B COMPLEX-C PO Take 1 Dose by mouth      vitamin E, tocopherol, 400 units capsule Take 1 Dose by mouth daily      [DISCONTINUED] pantoprazole (PROTONIX) 40 mg tablet Take 1 tablet (40 mg total) by mouth daily (Patient not taking: Reported on 12/24/2024) 90 tablet 1    [DISCONTINUED] sildenafil (REVATIO) 20 mg tablet Take 3-5 tabelts 1 hour prior to intercourse. (Patient not taking: Reported on 12/24/2024) 30 tablet 1     No current facility-administered medications on file prior to visit.        Objective   /70 (BP Location: Left arm, Patient Position: Sitting, Cuff Size: Large)   Pulse 65   Temp 98.2 °F (36.8 °C) (Temporal)   Resp 16   Ht 6' (1.829 m)   Wt 85.3 kg (188 lb)   SpO2 97%   BMI 25.50 kg/m²     Physical Exam  Vitals reviewed.   Constitutional:       General: He is not in acute distress.     Appearance: Normal appearance.   HENT:      Head: Normocephalic and atraumatic.      Right Ear: External ear normal.      Left Ear: External ear normal.      Nose: Nose normal.      Mouth/Throat:      Pharynx: Oropharynx is clear.   Eyes:      Conjunctiva/sclera: Conjunctivae normal.   Cardiovascular:      Rate and Rhythm: Normal rate and regular rhythm.      Pulses: Normal pulses.      Heart sounds: Normal heart sounds.   Pulmonary:      Effort: Pulmonary effort is normal.      Breath sounds: Normal breath sounds.   Abdominal:      Palpations: Abdomen is soft.   Musculoskeletal:      Right lower leg: No edema.      Left lower leg: No edema.   Skin:     General: Skin is warm.      Findings: Rash (dermatitis of gluteal cleft region) present.      Comments: Varicose vein R shin   Neurological:      Mental Status:  He is alert and oriented to person, place, and time.   Psychiatric:         Mood and Affect: Mood normal.         Behavior: Behavior normal.

## 2025-04-07 ENCOUNTER — RESULTS FOLLOW-UP (OUTPATIENT)
Dept: FAMILY MEDICINE CLINIC | Facility: CLINIC | Age: 65
End: 2025-04-07

## 2025-04-18 ENCOUNTER — OFFICE VISIT (OUTPATIENT)
Dept: GASTROENTEROLOGY | Facility: AMBULARY SURGERY CENTER | Age: 65
End: 2025-04-18
Payer: COMMERCIAL

## 2025-04-18 ENCOUNTER — PREP FOR PROCEDURE (OUTPATIENT)
Dept: GASTROENTEROLOGY | Facility: AMBULARY SURGERY CENTER | Age: 65
End: 2025-04-18

## 2025-04-18 ENCOUNTER — TELEPHONE (OUTPATIENT)
Dept: GASTROENTEROLOGY | Facility: AMBULARY SURGERY CENTER | Age: 65
End: 2025-04-18

## 2025-04-18 VITALS
BODY MASS INDEX: 25.41 KG/M2 | WEIGHT: 187.6 LBS | SYSTOLIC BLOOD PRESSURE: 125 MMHG | DIASTOLIC BLOOD PRESSURE: 76 MMHG | HEART RATE: 58 BPM | HEIGHT: 72 IN

## 2025-04-18 DIAGNOSIS — R13.19 ESOPHAGEAL DYSPHAGIA: ICD-10-CM

## 2025-04-18 DIAGNOSIS — Z86.0100 HISTORY OF COLON POLYPS: Primary | ICD-10-CM

## 2025-04-18 DIAGNOSIS — K21.9 GASTROESOPHAGEAL REFLUX DISEASE WITHOUT ESOPHAGITIS: ICD-10-CM

## 2025-04-18 DIAGNOSIS — K64.8 INTERNAL HEMORRHOIDS: ICD-10-CM

## 2025-04-18 PROCEDURE — 99214 OFFICE O/P EST MOD 30 MIN: CPT | Performed by: INTERNAL MEDICINE

## 2025-04-18 RX ORDER — SODIUM CHLORIDE, SODIUM LACTATE, POTASSIUM CHLORIDE, CALCIUM CHLORIDE 600; 310; 30; 20 MG/100ML; MG/100ML; MG/100ML; MG/100ML
125 INJECTION, SOLUTION INTRAVENOUS CONTINUOUS
OUTPATIENT
Start: 2025-04-18

## 2025-04-18 NOTE — H&P (VIEW-ONLY)
Name: Roly Giles      : 1960      MRN: [id number]  Encounter Provider: Polina Fine MD  Encounter Date: 2025   Encounter department: [facility]  :  Assessment & Plan  History of colon polyps  - History of tubular adenomas noted on a colonoscopy in 2021.  -Patient is overdue for colonoscopy at this time.  -Patient was explained about the risks and benefits of the procedure. Risks including but not limited to bleeding, infection, perforation were explained in detail. Also explained about less than 100% sensitivity with the exam and other alternatives.  -Eliquis will need to be held before procedure.  - Recommend MiraLAX/Dulcolax bowel prep.  Orders:    Colonoscopy; Future    Gastroesophageal reflux disease without esophagitis  - Currently on famotidine 20 mg in the morning with occasional breakthrough symptoms of acid reflux, reports that he has been having dysphagia symptoms for a few months with both liquids and solids intermittently.  -Would recommend to increase famotidine to 20 mg twice daily.  Will plan for EGD for further evaluation.  Orders:    EGD; Future    Esophageal dysphagia  - May be due to esophageal dysmotility versus erosive esophagitis versus less likely EOE.  -Recommend increasing famotidine to 20 mg twice daily.  -Will plan for EGD for further evaluation.       Internal hemorrhoids  - Recommend Preparation H suppository.           History of Present Illness   Roly Giles is a 64 y.o. male with history of A-fib, CVA no residual weakness, anticoagulated with Eliquis, history of diverticulitis, presents for follow-up.  Patient reports that he has been having recurrent symptoms of acid reflux and occasional globus sensation/dysphagia type symptoms with feeling of food and liquids getting stuck.  Patient was that has been taking famotidine 20 mg on daily basis.  He reports that he stopped taking PPI few years ago.  He denies any  hematemesis, coffee-ground emesis or melena.  Reports feeling rectal pressure, uses Preparation H wipes with minimal relief.  No melena or hematochezia.  No unintentional weight loss.  No family history of GI malignancy.  Patient underwent EGD and colonoscopy in September 2021 with findings notable for a large antral ulcer, gastritis.  He was also noted to have several tubular adenomas.  He was recommended repeat colonoscopy at 3-year interval.  He underwent repeat EGD in December 2021 notable for healing of the gastric ulcer and with mild gastritis only.  HPI    Review of Systems   Constitutional: Negative.    HENT: Negative.     Eyes: Negative.    Respiratory: Negative.     Cardiovascular: Negative.    Gastrointestinal:         See HPI.   Endocrine: Negative.    Genitourinary: Negative.    Musculoskeletal: Negative.    Skin: Negative.    Allergic/Immunologic: Negative.    Neurological: Negative.    Hematological: Negative.    Psychiatric/Behavioral: Negative.     All other systems reviewed and are negative.   A complete review of systems is negative other than that noted above in the HPI.      Current Outpatient Medications   Medication Sig Dispense Refill    apixaban (Eliquis) 5 mg Take 1 tablet (5 mg total) by mouth 2 (two) times a day 180 tablet 3    Ascorbic Acid (VITAMIN C) 1000 MG tablet Take 1,000 Doses by mouth      BIOTIN PO Take 1 capsule by mouth daily      dofetilide (TIKOSYN) 500 mcg capsule Take 1 capsule (500 mcg total) by mouth every 12 (twelve) hours 180 capsule 3    famotidine (PEPCID) 40 MG tablet Take 0.5 tablets (20 mg total) by mouth daily 30 tablet 5    Ferrous Sulfate (IRON) 325 (65 Fe) MG TABS Take 1 Dose by mouth 3 (three) times a week QOD      fluticasone (FLONASE) 50 mcg/act nasal spray 50 sprays into each nostril daily      Glucosamine HCl (GLUCOSAMINE PO) Take 1 capsule by mouth      hydrocortisone 2.5 % cream Apply topically 3 (three) times a day 28 g 1    lisinopril (ZESTRIL) 10 mg  tablet Take 1 tablet (10 mg total) by mouth daily 100 tablet 3    metoprolol succinate (TOPROL-XL) 25 mg 24 hr tablet Take 0.5 tablets (12.5 mg total) by mouth daily 45 tablet 1    Multiple Vitamins-Minerals (PRESERVISION AREDS 2+MULTI VIT PO) Take by mouth      VITAMIN B COMPLEX-C PO Take 1 Dose by mouth      vitamin E, tocopherol, 400 units capsule Take 1 Dose by mouth daily       No current facility-administered medications for this visit.     Objective   /76 (Patient Position: Sitting, Cuff Size: Standard)   Pulse 58   Ht 6' (1.829 m)   Wt 85.1 kg (187 lb 9.6 oz)   BMI 25.44 kg/m²     Physical Exam  Vitals and nursing note reviewed.   Constitutional:       General: He is not in acute distress.     Appearance: He is well-developed.   HENT:      Head: Normocephalic and atraumatic.   Eyes:      Conjunctiva/sclera: Conjunctivae normal.   Cardiovascular:      Rate and Rhythm: Normal rate and regular rhythm.      Heart sounds: No murmur heard.  Pulmonary:      Effort: Pulmonary effort is normal. No respiratory distress.      Breath sounds: Normal breath sounds.   Abdominal:      Palpations: Abdomen is soft.      Tenderness: There is no abdominal tenderness.   Musculoskeletal:         General: No swelling.      Cervical back: Neck supple.   Skin:     General: Skin is warm and dry.      Capillary Refill: Capillary refill takes less than 2 seconds.   Neurological:      Mental Status: He is alert.   Psychiatric:         Mood and Affect: Mood normal.           Lab Results: I personally reviewed relevant lab results.       Results for orders placed during the hospital encounter of 12/23/21    EGD    Impression  1. Previously seen ulcer has healed.  2. Mild patchy gastritis in the antrum, biopsies obtained again to assess for H pylori.    RECOMMENDATION:  Await pathology results  Follow-up biopsy results in 2 weeks.  Avoid NSAIDs.  Can decrease PPI to once daily.  Advised to avoid fatty foods, chocolates, caffeine,  alcohol and any other triggering foods.  Avoid eating for at least 3 hours before going to bed.  Resume Rod.      Polina Fine MD

## 2025-04-18 NOTE — TELEPHONE ENCOUNTER
Our mutual patient is scheduled for procedure: colonoscopy and EGD    On: May 05, 2025     With: Dr. Polina Fine MD    He/She is taking the following blood thinner: Eliquis (Apixaban)    Can this be stopped  2 days prior to the procedure    Physician Approving clearance:  Dr. Yasir Casanova MD

## 2025-04-18 NOTE — ASSESSMENT & PLAN NOTE
- History of tubular adenomas noted on a colonoscopy in September 2021.  -Patient is overdue for colonoscopy at this time.  -Patient was explained about the risks and benefits of the procedure. Risks including but not limited to bleeding, infection, perforation were explained in detail. Also explained about less than 100% sensitivity with the exam and other alternatives.  -Eliquis will need to be held before procedure.  - Recommend MiraLAX/Dulcolax bowel prep.  Orders:    Colonoscopy; Future

## 2025-04-18 NOTE — PATIENT INSTRUCTIONS
Scheduled date of EGD/colonoscopy (as of today): May 5, 2025   Physician performing EGD/colonoscopy: Dr. Fine   Location of EGD/colonoscopy: [facility]   Desired bowel prep reviewed with patient: jovan/dulc   Instructions reviewed with patient by: DAMARIS   Clearances:  jaren

## 2025-04-18 NOTE — PROGRESS NOTES
Name: Roly Giles      : 1960      MRN: [id number]  Encounter Provider: Polina Fine MD  Encounter Date: 2025   Encounter department: [facility]  :  Assessment & Plan  History of colon polyps  - History of tubular adenomas noted on a colonoscopy in 2021.  -Patient is overdue for colonoscopy at this time.  -Patient was explained about the risks and benefits of the procedure. Risks including but not limited to bleeding, infection, perforation were explained in detail. Also explained about less than 100% sensitivity with the exam and other alternatives.  -Eliquis will need to be held before procedure.  - Recommend MiraLAX/Dulcolax bowel prep.  Orders:    Colonoscopy; Future    Gastroesophageal reflux disease without esophagitis  - Currently on famotidine 20 mg in the morning with occasional breakthrough symptoms of acid reflux, reports that he has been having dysphagia symptoms for a few months with both liquids and solids intermittently.  -Would recommend to increase famotidine to 20 mg twice daily.  Will plan for EGD for further evaluation.  Orders:    EGD; Future    Esophageal dysphagia  - May be due to esophageal dysmotility versus erosive esophagitis versus less likely EOE.  -Recommend increasing famotidine to 20 mg twice daily.  -Will plan for EGD for further evaluation.       Internal hemorrhoids  - Recommend Preparation H suppository.           History of Present Illness   Roly Giles is a 64 y.o. male with history of A-fib, CVA no residual weakness, anticoagulated with Eliquis, history of diverticulitis, presents for follow-up.  Patient reports that he has been having recurrent symptoms of acid reflux and occasional globus sensation/dysphagia type symptoms with feeling of food and liquids getting stuck.  Patient was that has been taking famotidine 20 mg on daily basis.  He reports that he stopped taking PPI few years ago.  He denies any  hematemesis, coffee-ground emesis or melena.  Reports feeling rectal pressure, uses Preparation H wipes with minimal relief.  No melena or hematochezia.  No unintentional weight loss.  No family history of GI malignancy.  Patient underwent EGD and colonoscopy in September 2021 with findings notable for a large antral ulcer, gastritis.  He was also noted to have several tubular adenomas.  He was recommended repeat colonoscopy at 3-year interval.  He underwent repeat EGD in December 2021 notable for healing of the gastric ulcer and with mild gastritis only.  HPI    Review of Systems   Constitutional: Negative.    HENT: Negative.     Eyes: Negative.    Respiratory: Negative.     Cardiovascular: Negative.    Gastrointestinal:         See HPI.   Endocrine: Negative.    Genitourinary: Negative.    Musculoskeletal: Negative.    Skin: Negative.    Allergic/Immunologic: Negative.    Neurological: Negative.    Hematological: Negative.    Psychiatric/Behavioral: Negative.     All other systems reviewed and are negative.   A complete review of systems is negative other than that noted above in the HPI.      Current Outpatient Medications   Medication Sig Dispense Refill    apixaban (Eliquis) 5 mg Take 1 tablet (5 mg total) by mouth 2 (two) times a day 180 tablet 3    Ascorbic Acid (VITAMIN C) 1000 MG tablet Take 1,000 Doses by mouth      BIOTIN PO Take 1 capsule by mouth daily      dofetilide (TIKOSYN) 500 mcg capsule Take 1 capsule (500 mcg total) by mouth every 12 (twelve) hours 180 capsule 3    famotidine (PEPCID) 40 MG tablet Take 0.5 tablets (20 mg total) by mouth daily 30 tablet 5    Ferrous Sulfate (IRON) 325 (65 Fe) MG TABS Take 1 Dose by mouth 3 (three) times a week QOD      fluticasone (FLONASE) 50 mcg/act nasal spray 50 sprays into each nostril daily      Glucosamine HCl (GLUCOSAMINE PO) Take 1 capsule by mouth      hydrocortisone 2.5 % cream Apply topically 3 (three) times a day 28 g 1    lisinopril (ZESTRIL) 10 mg  tablet Take 1 tablet (10 mg total) by mouth daily 100 tablet 3    metoprolol succinate (TOPROL-XL) 25 mg 24 hr tablet Take 0.5 tablets (12.5 mg total) by mouth daily 45 tablet 1    Multiple Vitamins-Minerals (PRESERVISION AREDS 2+MULTI VIT PO) Take by mouth      VITAMIN B COMPLEX-C PO Take 1 Dose by mouth      vitamin E, tocopherol, 400 units capsule Take 1 Dose by mouth daily       No current facility-administered medications for this visit.     Objective   /76 (Patient Position: Sitting, Cuff Size: Standard)   Pulse 58   Ht 6' (1.829 m)   Wt 85.1 kg (187 lb 9.6 oz)   BMI 25.44 kg/m²     Physical Exam  Vitals and nursing note reviewed.   Constitutional:       General: He is not in acute distress.     Appearance: He is well-developed.   HENT:      Head: Normocephalic and atraumatic.   Eyes:      Conjunctiva/sclera: Conjunctivae normal.   Cardiovascular:      Rate and Rhythm: Normal rate and regular rhythm.      Heart sounds: No murmur heard.  Pulmonary:      Effort: Pulmonary effort is normal. No respiratory distress.      Breath sounds: Normal breath sounds.   Abdominal:      Palpations: Abdomen is soft.      Tenderness: There is no abdominal tenderness.   Musculoskeletal:         General: No swelling.      Cervical back: Neck supple.   Skin:     General: Skin is warm and dry.      Capillary Refill: Capillary refill takes less than 2 seconds.   Neurological:      Mental Status: He is alert.   Psychiatric:         Mood and Affect: Mood normal.           Lab Results: I personally reviewed relevant lab results.       Results for orders placed during the hospital encounter of 12/23/21    EGD    Impression  1. Previously seen ulcer has healed.  2. Mild patchy gastritis in the antrum, biopsies obtained again to assess for H pylori.    RECOMMENDATION:  Await pathology results  Follow-up biopsy results in 2 weeks.  Avoid NSAIDs.  Can decrease PPI to once daily.  Advised to avoid fatty foods, chocolates, caffeine,  alcohol and any other triggering foods.  Avoid eating for at least 3 hours before going to bed.  Resume Rod.      Polina Fine MD

## 2025-04-18 NOTE — ASSESSMENT & PLAN NOTE
- Currently on famotidine 20 mg in the morning with occasional breakthrough symptoms of acid reflux, reports that he has been having dysphagia symptoms for a few months with both liquids and solids intermittently.  -Would recommend to increase famotidine to 20 mg twice daily.  Will plan for EGD for further evaluation.  Orders:    EGD; Future

## 2025-04-21 ENCOUNTER — ANESTHESIA EVENT (OUTPATIENT)
Dept: ANESTHESIOLOGY | Facility: HOSPITAL | Age: 65
End: 2025-04-21

## 2025-04-21 ENCOUNTER — ANESTHESIA (OUTPATIENT)
Dept: ANESTHESIOLOGY | Facility: HOSPITAL | Age: 65
End: 2025-04-21

## 2025-05-05 ENCOUNTER — ANESTHESIA (OUTPATIENT)
Dept: GASTROENTEROLOGY | Facility: AMBULARY SURGERY CENTER | Age: 65
End: 2025-05-05
Payer: COMMERCIAL

## 2025-05-05 ENCOUNTER — HOSPITAL ENCOUNTER (OUTPATIENT)
Dept: GASTROENTEROLOGY | Facility: AMBULARY SURGERY CENTER | Age: 65
Setting detail: OUTPATIENT SURGERY
Discharge: HOME | End: 2025-05-05
Attending: INTERNAL MEDICINE
Payer: COMMERCIAL

## 2025-05-05 VITALS
SYSTOLIC BLOOD PRESSURE: 110 MMHG | OXYGEN SATURATION: 98 % | HEART RATE: 69 BPM | DIASTOLIC BLOOD PRESSURE: 65 MMHG | TEMPERATURE: 96.5 F | RESPIRATION RATE: 20 BRPM

## 2025-05-05 DIAGNOSIS — Z86.0100 HISTORY OF COLON POLYPS: ICD-10-CM

## 2025-05-05 DIAGNOSIS — K21.9 GASTROESOPHAGEAL REFLUX DISEASE WITHOUT ESOPHAGITIS: ICD-10-CM

## 2025-05-05 PROCEDURE — 88305 TISSUE EXAM BY PATHOLOGIST: CPT | Performed by: PATHOLOGY

## 2025-05-05 PROCEDURE — 45385 COLONOSCOPY W/LESION REMOVAL: CPT | Performed by: INTERNAL MEDICINE

## 2025-05-05 PROCEDURE — 43239 EGD BIOPSY SINGLE/MULTIPLE: CPT | Performed by: INTERNAL MEDICINE

## 2025-05-05 RX ORDER — SODIUM CHLORIDE, SODIUM LACTATE, POTASSIUM CHLORIDE, CALCIUM CHLORIDE 600; 310; 30; 20 MG/100ML; MG/100ML; MG/100ML; MG/100ML
INJECTION, SOLUTION INTRAVENOUS CONTINUOUS PRN
Status: DISCONTINUED | OUTPATIENT
Start: 2025-05-05 | End: 2025-05-05

## 2025-05-05 RX ORDER — LIDOCAINE HYDROCHLORIDE 20 MG/ML
INJECTION, SOLUTION EPIDURAL; INFILTRATION; INTRACAUDAL; PERINEURAL AS NEEDED
Status: DISCONTINUED | OUTPATIENT
Start: 2025-05-05 | End: 2025-05-05

## 2025-05-05 RX ORDER — FENTANYL CITRATE 50 UG/ML
INJECTION, SOLUTION INTRAMUSCULAR; INTRAVENOUS AS NEEDED
Status: DISCONTINUED | OUTPATIENT
Start: 2025-05-05 | End: 2025-05-05

## 2025-05-05 RX ORDER — PROPOFOL 10 MG/ML
INJECTION, EMULSION INTRAVENOUS AS NEEDED
Status: DISCONTINUED | OUTPATIENT
Start: 2025-05-05 | End: 2025-05-05

## 2025-05-05 RX ADMIN — PROPOFOL 25 MG: 10 INJECTION, EMULSION INTRAVENOUS at 09:47

## 2025-05-05 RX ADMIN — PROPOFOL 25 MG: 10 INJECTION, EMULSION INTRAVENOUS at 09:50

## 2025-05-05 RX ADMIN — SODIUM CHLORIDE, SODIUM LACTATE, POTASSIUM CHLORIDE, AND CALCIUM CHLORIDE: .6; .31; .03; .02 INJECTION, SOLUTION INTRAVENOUS at 09:25

## 2025-05-05 RX ADMIN — LIDOCAINE HYDROCHLORIDE 100 MG: 20 INJECTION, SOLUTION EPIDURAL; INFILTRATION; INTRACAUDAL at 09:30

## 2025-05-05 RX ADMIN — PROPOFOL 125 MG: 10 INJECTION, EMULSION INTRAVENOUS at 09:31

## 2025-05-05 RX ADMIN — FENTANYL CITRATE 50 MCG: 50 INJECTION INTRAMUSCULAR; INTRAVENOUS at 09:40

## 2025-05-05 RX ADMIN — PROPOFOL 25 MG: 10 INJECTION, EMULSION INTRAVENOUS at 09:34

## 2025-05-05 RX ADMIN — PROPOFOL 25 MG: 10 INJECTION, EMULSION INTRAVENOUS at 09:40

## 2025-05-05 RX ADMIN — PROPOFOL 25 MG: 10 INJECTION, EMULSION INTRAVENOUS at 09:54

## 2025-05-05 RX ADMIN — PROPOFOL 25 MG: 10 INJECTION, EMULSION INTRAVENOUS at 09:35

## 2025-05-05 RX ADMIN — PROPOFOL 25 MG: 10 INJECTION, EMULSION INTRAVENOUS at 09:36

## 2025-05-05 RX ADMIN — SODIUM CHLORIDE, SODIUM LACTATE, POTASSIUM CHLORIDE, AND CALCIUM CHLORIDE: .6; .31; .03; .02 INJECTION, SOLUTION INTRAVENOUS at 10:00

## 2025-05-05 RX ADMIN — PROPOFOL 25 MG: 10 INJECTION, EMULSION INTRAVENOUS at 09:38

## 2025-05-05 RX ADMIN — PROPOFOL 25 MG: 10 INJECTION, EMULSION INTRAVENOUS at 09:41

## 2025-05-05 RX ADMIN — PROPOFOL 25 MG: 10 INJECTION, EMULSION INTRAVENOUS at 09:44

## 2025-05-05 RX ADMIN — FENTANYL CITRATE 50 MCG: 50 INJECTION INTRAMUSCULAR; INTRAVENOUS at 09:29

## 2025-05-05 NOTE — ANESTHESIA POSTPROCEDURE EVALUATION
Post-Op Assessment Note    CV Status:  Stable    Pain management: adequate       Mental Status:  Lethargic and sleepy   Hydration Status:  Stable   PONV Controlled:  None   Airway Patency:  Patent     Post Op Vitals Reviewed: Yes    No anethesia notable event occurred.    Staff: CRNA           Last Filed PACU Vitals:  Vitals Value Taken Time   Temp 96.5 °F (35.8 °C) 05/05/25 0959   Pulse 81 05/05/25 0959   BP 87/53 05/05/25 0959   Resp 12 05/05/25 0959   SpO2 92 % 05/05/25 0959       Modified Jamie:     Vitals Value Taken Time   Activity 2 05/05/25 0959   Respiration 2 05/05/25 0959   Circulation 0 05/05/25 0959   Consciousness 0 05/05/25 0959   Oxygen Saturation 2 05/05/25 0959     Modified Jamie Score: 6

## 2025-05-05 NOTE — INTERVAL H&P NOTE
H&P reviewed. After examining the patient I find no changes in the patients condition since the H&P had been written.    Vitals:    05/05/25 0814   BP: 121/67   Pulse: 80   Resp: 17   Temp: (!) 96.8 °F (36 °C)   SpO2: 98%

## 2025-05-05 NOTE — ANESTHESIA PREPROCEDURE EVALUATION
Procedure:  COLONOSCOPY  EGD    Relevant Problems   ANESTHESIA (within normal limits)      CARDIO   (+) Aortic root dilatation (HCC)   (+) Benign essential hypertension   (+) Coronary artery disease   (+) PAF (paroxysmal atrial fibrillation) (HCC)      GI/HEPATIC   (+) Gastroesophageal reflux disease      NEURO/PSYCH   (+) Cerebrovascular accident (CVA) (HCC)      PULMONARY   (+) Asthma      Other   (+) Sarcoidosis        Physical Exam    Airway    Mallampati score: III  TM Distance: >3 FB  Neck ROM: full     Dental    upper dentures,     Cardiovascular      Pulmonary      Other Findings        Anesthesia Plan  ASA Score- 3     Anesthesia Type- IV sedation with anesthesia with ASA Monitors.         Additional Monitors:     Airway Plan:            Plan Factors-Exercise tolerance (METS): >4 METS.    Chart reviewed. EKG reviewed.  Existing labs reviewed. Patient summary reviewed.    Patient is not a current smoker.              Induction- intravenous.    Postoperative Plan-         Informed Consent- Anesthetic plan and risks discussed with patient.  I personally reviewed this patient with the CRNA. Discussed and agreed on the Anesthesia Plan with the CRNA..      NPO Status:  Vitals Value Taken Time   Date of last liquid 05/05/25 05/05/25 0805   Time of last liquid 0330 05/05/25 0805   Date of last solid 05/03/25 05/05/25 0805   Time of last solid 2100 05/05/25 0805

## 2025-05-06 ENCOUNTER — TELEPHONE (OUTPATIENT)
Dept: GASTROENTEROLOGY | Facility: AMBULARY SURGERY CENTER | Age: 65
End: 2025-05-06

## 2025-05-06 DIAGNOSIS — K21.9 GASTROESOPHAGEAL REFLUX DISEASE WITHOUT ESOPHAGITIS: Primary | ICD-10-CM

## 2025-05-06 RX ORDER — OMEPRAZOLE 40 MG/1
40 CAPSULE, DELAYED RELEASE ORAL DAILY
Qty: 90 CAPSULE | Refills: 1 | Status: SHIPPED | OUTPATIENT
Start: 2025-05-06

## 2025-05-06 NOTE — TELEPHONE ENCOUNTER
Patient called the RX Refill Line. Message is being forwarded to the office.     Patient is requesting a RX for omeprazole 40 mg     Patient reports he had an EGD on 5.5.2025 and Dr Fine advised to stop the famotine 40 mg and change to omeprazole 40mg.    He is asking for a RX to be sent to     [facility] - [place] - [address]

## 2025-05-08 PROCEDURE — 88305 TISSUE EXAM BY PATHOLOGIST: CPT | Performed by: PATHOLOGY

## 2025-05-13 ENCOUNTER — RESULTS FOLLOW-UP (OUTPATIENT)
Dept: GASTROENTEROLOGY | Facility: AMBULARY SURGERY CENTER | Age: 65
End: 2025-05-13

## 2025-05-13 DIAGNOSIS — K20.0 EOSINOPHILIC ESOPHAGITIS: Primary | ICD-10-CM

## 2025-05-28 ENCOUNTER — OFFICE VISIT (OUTPATIENT)
Dept: DERMATOLOGY | Facility: CLINIC | Age: 65
End: 2025-05-28
Payer: COMMERCIAL

## 2025-05-28 VITALS — WEIGHT: 189.1 LBS | BODY MASS INDEX: 25.65 KG/M2 | TEMPERATURE: 97.8 F

## 2025-05-28 DIAGNOSIS — D48.9 NEOPLASM OF UNCERTAIN BEHAVIOR: Primary | ICD-10-CM

## 2025-05-28 PROCEDURE — 88341 IMHCHEM/IMCYTCHM EA ADD ANTB: CPT | Performed by: STUDENT IN AN ORGANIZED HEALTH CARE EDUCATION/TRAINING PROGRAM

## 2025-05-28 PROCEDURE — 88342 IMHCHEM/IMCYTCHM 1ST ANTB: CPT | Performed by: STUDENT IN AN ORGANIZED HEALTH CARE EDUCATION/TRAINING PROGRAM

## 2025-05-28 PROCEDURE — 99204 OFFICE O/P NEW MOD 45 MIN: CPT | Performed by: STUDENT IN AN ORGANIZED HEALTH CARE EDUCATION/TRAINING PROGRAM

## 2025-05-28 PROCEDURE — 88305 TISSUE EXAM BY PATHOLOGIST: CPT | Performed by: STUDENT IN AN ORGANIZED HEALTH CARE EDUCATION/TRAINING PROGRAM

## 2025-05-28 PROCEDURE — 11102 TANGNTL BX SKIN SINGLE LES: CPT | Performed by: STUDENT IN AN ORGANIZED HEALTH CARE EDUCATION/TRAINING PROGRAM

## 2025-05-28 NOTE — PROGRESS NOTES
"[facility] Dermatology Clinic Note     Patient Name: Roly Giles  Encounter Date: 5/28/2025       Have you been cared for by a [facility] Dermatologist in the last 3 years and, if so, which description applies to you? NO. I am considered a \"new\" patient and must complete all patient intake questions. I am not of child-bearing potential.     REVIEW OF SYSTEMS:  Have you recently had or currently have any of the following? Recent fever or chills? No  Any non-healing wound? No   PAST MEDICAL HISTORY:  Have you personally ever had or currently have any of the following?  If \"YES,\" then please provide more detail. Skin cancer (such as Melanoma, Basal Cell Carcinoma, Squamous Cell Carcinoma?  No  Tuberculosis, HIV/AIDS, Hepatitis B or C: No  Radiation Treatment No   HISTORY OF IMMUNOSUPPRESSION:   Do you have a history of any of the following:  Systemic Immunosuppression such as Diabetes, Biologic or Immunotherapy, Chemotherapy, Organ Transplantation, Bone Marrow Transplantation or Prednisone?  No     Answering \"YES\" requires the addition of the dotphrase \"IMMUNOSUPPRESSED\" as the first diagnosis of the patient's visit.   FAMILY HISTORY:  Any \"first degree relatives\" (parent, brother, sister, or child) with the following?    Skin Cancer, Pancreatic or Other Cancer? No   PATIENT EXPERIENCE:    Do you want the Dermatologist to perform a COMPLETE skin exam today including a clinical examination under the \"bra and underwear\" areas?  NO  If necessary, do we have your permission to call and leave a detailed message on your Preferred Phone number that includes your specific medical information?  Yes      Allergies[1] Current Medications[2]              Whom besides the patient is providing clinical information about today's encounter?   NO ADDITIONAL HISTORIAN (patient alone provided history)    Physical Exam and Assessment/Plan by Diagnosis:    NEOPLASM OF UNCERTAIN BEHAVIOR OF SKIN    Physical Exam:  (Anatomic Location); " "(Size and Morphological Description); (Differential Diagnosis):  Specimen A:Left Thigh;Skin; Shave Biopsy; 64 year old male with a 1.1 x 1 cm pink brown plaque;Differential Diagnosis:Lentigo versus SCCIS  Pertinent Positives:  Pertinent Negatives:    Additional History of Present Condition:  64 year old male presenting for a spot of concern on his left leg. Patient states he noticed the mole 5 years ago. Patient states he feels the mole has changed in shape. Patient denies any bleeding, pain, or itching.     Assessment and Plan:  I have discussed with the patient that a sample of skin via a \"skin biopsy” would be potentially helpful to further make a specific diagnosis under the microscope.  Based on a thorough discussion of this condition and the management approach to it (including a comprehensive discussion of the known risks, side effects and potential benefits of treatment), the patient (family) agrees to implement the following specific plan:    Procedure:  Skin Biopsy.  After a thorough discussion of treatment options and risk/benefits/alternatives (including but not limited to local pain, scarring, dyspigmentation, blistering, possible superinfection, and inability to confirm a diagnosis via histopathology), verbal and written consent were obtained and portion of the rash was biopsied for tissue sample.  See below for consent that was obtained from patient and subsequent Procedure Note.   PROCEDURE TANGENTIAL (SHAVE) BIOPSY NOTE:    Performing Physician:   Anatomic Location; Clinical Description with size (cm); Pre-Op Diagnosis:   Specimen A:Left Thigh;Skin; Shave Biopsy; 64 year old male with a 1.1 x 1 cm pink brown plaque;Differential Diagnosis:Lentigo versus SCCIS  Post-op diagnosis: Same     Local anesthesia: 1% xylocaine with epi      Topical anesthesia: None    Hemostasis: Aluminum chloride       After obtaining informed consent  at which time there was a discussion about the purpose of biopsy " " and low risks of infection and bleeding.  The area was prepped and draped in the usual fashion. Anesthesia was obtained with 1% lidocaine with epinephrine. A shave biopsy to an appropriate sampling depth was obtained by Shave (Dermablade or 15 blade) The resulting wound was covered with surgical ointment and bandaged appropriately.     The patient tolerated the procedure well without complications and was without signs of functional compromise.      Specimen has been sent for review by Dermatopathology.    Standard post-procedure care has been explained and has been included in written form within the patient's copy of Informed Consent.    INFORMED CONSENT DISCUSSION AND POST-OPERATIVE INSTRUCTIONS FOR PATIENT    I.  RATIONALE FOR PROCEDURE  I understand that a skin biopsy allows the Dermatologist to test a lesion or rash under the microscope to obtain a diagnosis.  It usually involves numbing the area with numbing medication and removing a small piece of skin; sometimes the area will be closed with sutures. In this specific procedure, sutures are not usually needed.  If any sutures are placed, then they are usually need to be removed in 2 weeks or less.    I understand that my Dermatologist recommends that a skin \"shave\" biopsy be performed today.  A local anesthetic, similar to the kind that a dentist uses when filling a cavity, will be injected with a very small needle into the skin area to be sampled.  The injected skin and tissue underneath \"will go to sleep” and become numb so no pain should be felt afterwards.  An instrument shaped like a tiny \"razor blade\" (shave biopsy instrument) will be used to cut a small piece of tissue and skin from the area so that a sample of tissue can be taken and examined more closely under the microscope.  A slight amount of bleeding will occur, but it will be stopped with direct pressure and a pressure bandage and any other appropriate methods.  I understands that a scar will " "form where the wound was created.  Surgical ointment will be applied to help protect the wound.  Sutures are not usually needed.    II.  RISKS AND POTENTIAL COMPLICATIONS   I understand the risks and potential complications of a skin biopsy include but are not limited to the following:  Bleeding  Infection  Pain  Scar/keloid  Skin discoloration  Incomplete Removal  Recurrence  Nerve Damage/Numbness/Loss of Function  Allergic Reaction to Anesthesia  Biopsies are diagnostic procedures and based on findings additional treatment or evaluation may be required  Loss or destruction of specimen resulting in no additional findings    My Dermatologist has explained to me the nature of the condition, the nature of the procedure, and the benefits to be reasonably expected compared with alternative approaches.  My Dermatologist has discussed the likelihood of major risks or complications of this procedure including the specific risks listed above, such as bleeding, infection, and scarring/keloid.  I understand that a scar is expected after this procedure.  I understand that my physician cannot predict if the scar will form a \"keloid,\" which extends beyond the borders of the wound that is created.  A keloid is a thick, painful, and bumpy scar.  A keloid can be difficult to treat, as it does not always respond well to therapy, which includes injecting cortisone directly into the keloid every few weeks.  While this usually reduces the pain and size of the scar, it does not eliminate it.      I understand that photographs may be taken before and after the procedure.  These will be maintained as part of the medical providers confidential records and may not be made available to me.  I further authorize the medical provider to use the photographs for teaching purposes or to illustrate scientific papers, books, or lectures if in his/her judgment, medical research, education, or science may benefit from its use.    I have had an " "opportunity to fully inquire about the risks and benefits of this procedure and its alternatives.   I have been given ample time and opportunity to ask questions and to seek a second opinion if I wished to do so.  I acknowledge that there have specifically been no guarantees as to the cosmetic results from the procedure.  I am aware that with any procedure there is always the possibility of an unexpected complication.    III. POST-PROCEDURAL CARE (WHAT YOU WILL NEED TO DO \"AFTER THE BIOPSY\" TO OPTIMIZE HEALING)    Keep the area clean and dry.  Try NOT to remove the bandage or get it wet for the first 24 hours.    Gently clean the area and apply surgical ointment (such as Vaseline petrolatum ointment, which is available \"over the counter\" and not a prescription) to the biopsy site for up to 2 weeks straight.  This acts to protect the wound from the outside world.      Sutures are not usually placed in this procedure.  If any sutures were placed, return for suture removal as instructed (generally 1 week for the face, 2 weeks for the body).      Take Acetaminophen (Tylenol) for discomfort, if no contraindications.  Ibuprofen or aspirin could make bleeding worse.    Call our office immediately for signs of infection: fever, chills, increased redness, warmth, tenderness, discomfort/pain, or pus or foul smell coming from the wound.    WHAT TO DO IF THERE IS ANY BLEEDING?  If a small amount of bleeding is noticed, place a clean cloth over the area and apply firm pressure for ten minutes.  Check the wound after 10 minutes of direct pressure.  If bleeding persists, try one more time for an additional 10 minutes of direct pressure on the area.  If the bleeding becomes heavier or does not stop after the second attempt, or if you have any other questions about this procedure, then please call your . [facility] D[facility]at[facility] by calling [phone] (SKIN).     I hereby acknowledge that I have reviewed and verified the site with my " Dermatologist and have requested and authorized my Dermatologist to proceed with the procedure.    Scribe Attestation      I,:  Tri FLORENCE Vladislav am acting as a scribe while in the presence of the attending physician.:       I,:  Finn Lr MD personally performed the services described in this documentation    as scribed in my presence.:                      [1] No Known Allergies  [2]   Current Outpatient Medications:     apixaban (Eliquis) 5 mg, Take 1 tablet (5 mg total) by mouth 2 (two) times a day, Disp: 180 tablet, Rfl: 3    Ascorbic Acid (VITAMIN C) 1000 MG tablet, Take 1,000 Doses by mouth, Disp: , Rfl:     BIOTIN PO, Take 1 capsule by mouth daily, Disp: , Rfl:     dofetilide (TIKOSYN) 500 mcg capsule, Take 1 capsule (500 mcg total) by mouth every 12 (twelve) hours, Disp: 180 capsule, Rfl: 3    Ferrous Sulfate (IRON) 325 (65 Fe) MG TABS, Take 1 Dose by mouth 3 (three) times a week QOD, Disp: , Rfl:     fluticasone (FLONASE) 50 mcg/act nasal spray, 50 sprays into each nostril daily, Disp: , Rfl:     Glucosamine HCl (GLUCOSAMINE PO), Take 1 capsule by mouth, Disp: , Rfl:     hydrocortisone 2.5 % cream, Apply topically 3 (three) times a day, Disp: 28 g, Rfl: 1    lisinopril (ZESTRIL) 10 mg tablet, Take 1 tablet (10 mg total) by mouth daily, Disp: 100 tablet, Rfl: 3    metoprolol succinate (TOPROL-XL) 25 mg 24 hr tablet, Take 0.5 tablets (12.5 mg total) by mouth daily, Disp: 45 tablet, Rfl: 1    Multiple Vitamins-Minerals (PRESERVISION AREDS 2+MULTI VIT PO), Take by mouth, Disp: , Rfl:     omeprazole (PriLOSEC) 40 MG capsule, Take 1 capsule (40 mg total) by mouth daily Half hour before breakfast, Disp: 90 capsule, Rfl: 1    VITAMIN B COMPLEX-C PO, Take 1 Dose by mouth, Disp: , Rfl:     vitamin E, tocopherol, 400 units capsule, Take 1 Dose by mouth daily, Disp: , Rfl:

## 2025-05-28 NOTE — PATIENT INSTRUCTIONS
"  NEOPLASM OF UNCERTAIN BEHAVIOR OF SKIN    Assessment and Plan:  I have discussed with the patient that a sample of skin via a \"skin biopsy” would be potentially helpful to further make a specific diagnosis under the microscope.  Based on a thorough discussion of this condition and the management approach to it (including a comprehensive discussion of the known risks, side effects and potential benefits of treatment), the patient (family) agrees to implement the following specific plan:    Procedure:  Skin Biopsy.  After a thorough discussion of treatment options and risk/benefits/alternatives (including but not limited to local pain, scarring, dyspigmentation, blistering, possible superinfection, and inability to confirm a diagnosis via histopathology), verbal and written consent were obtained and portion of the rash was biopsied for tissue sample.  See below for consent that was obtained from patient and subsequent Procedure Note.   PROCEDURE TANGENTIAL (SHAVE) BIOPSY NOTE:      INFORMED CONSENT DISCUSSION AND POST-OPERATIVE INSTRUCTIONS FOR PATIENT    I.  RATIONALE FOR PROCEDURE  I understand that a skin biopsy allows the Dermatologist to test a lesion or rash under the microscope to obtain a diagnosis.  It usually involves numbing the area with numbing medication and removing a small piece of skin; sometimes the area will be closed with sutures. In this specific procedure, sutures are not usually needed.  If any sutures are placed, then they are usually need to be removed in 2 weeks or less.    I understand that my Dermatologist recommends that a skin \"shave\" biopsy be performed today.  A local anesthetic, similar to the kind that a dentist uses when filling a cavity, will be injected with a very small needle into the skin area to be sampled.  The injected skin and tissue underneath \"will go to sleep” and become numb so no pain should be felt afterwards.  An instrument shaped like a tiny \"razor blade\" (shave " "biopsy instrument) will be used to cut a small piece of tissue and skin from the area so that a sample of tissue can be taken and examined more closely under the microscope.  A slight amount of bleeding will occur, but it will be stopped with direct pressure and a pressure bandage and any other appropriate methods.  I understands that a scar will form where the wound was created.  Surgical ointment will be applied to help protect the wound.  Sutures are not usually needed.    II.  RISKS AND POTENTIAL COMPLICATIONS   I understand the risks and potential complications of a skin biopsy include but are not limited to the following:  Bleeding  Infection  Pain  Scar/keloid  Skin discoloration  Incomplete Removal  Recurrence  Nerve Damage/Numbness/Loss of Function  Allergic Reaction to Anesthesia  Biopsies are diagnostic procedures and based on findings additional treatment or evaluation may be required  Loss or destruction of specimen resulting in no additional findings    My Dermatologist has explained to me the nature of the condition, the nature of the procedure, and the benefits to be reasonably expected compared with alternative approaches.  My Dermatologist has discussed the likelihood of major risks or complications of this procedure including the specific risks listed above, such as bleeding, infection, and scarring/keloid.  I understand that a scar is expected after this procedure.  I understand that my physician cannot predict if the scar will form a \"keloid,\" which extends beyond the borders of the wound that is created.  A keloid is a thick, painful, and bumpy scar.  A keloid can be difficult to treat, as it does not always respond well to therapy, which includes injecting cortisone directly into the keloid every few weeks.  While this usually reduces the pain and size of the scar, it does not eliminate it.      I understand that photographs may be taken before and after the procedure.  These will be " "maintained as part of the medical providers confidential records and may not be made available to me.  I further authorize the medical provider to use the photographs for teaching purposes or to illustrate scientific papers, books, or lectures if in his/her judgment, medical research, education, or science may benefit from its use.    I have had an opportunity to fully inquire about the risks and benefits of this procedure and its alternatives.   I have been given ample time and opportunity to ask questions and to seek a second opinion if I wished to do so.  I acknowledge that there have specifically been no guarantees as to the cosmetic results from the procedure.  I am aware that with any procedure there is always the possibility of an unexpected complication.    III. POST-PROCEDURAL CARE (WHAT YOU WILL NEED TO DO \"AFTER THE BIOPSY\" TO OPTIMIZE HEALING)    Keep the area clean and dry.  Try NOT to remove the bandage or get it wet for the first 24 hours.    Gently clean the area and apply surgical ointment (such as Vaseline petrolatum ointment, which is available \"over the counter\" and not a prescription) to the biopsy site for up to 2 weeks straight.  This acts to protect the wound from the outside world.      Sutures are not usually placed in this procedure.  If any sutures were placed, return for suture removal as instructed (generally 1 week for the face, 2 weeks for the body).      Take Acetaminophen (Tylenol) for discomfort, if no contraindications.  Ibuprofen or aspirin could make bleeding worse.    Call our office immediately for signs of infection: fever, chills, increased redness, warmth, tenderness, discomfort/pain, or pus or foul smell coming from the wound.    WHAT TO DO IF THERE IS ANY BLEEDING?  If a small amount of bleeding is noticed, place a clean cloth over the area and apply firm pressure for ten minutes.  Check the wound after 10 minutes of direct pressure.  If bleeding persists, try one more " time for an additional 10 minutes of direct pressure on the area.  If the bleeding becomes heavier or does not stop after the second attempt, or if you have any other questions about this procedure, then please call your [facility] Dermatologist by calling [phone] (SKIN).     I hereby acknowledge that I have reviewed and verified the site with my Dermatologist and have requested and authorized my Dermatologist to proceed with the procedure.

## 2025-05-30 PROCEDURE — 88341 IMHCHEM/IMCYTCHM EA ADD ANTB: CPT | Performed by: STUDENT IN AN ORGANIZED HEALTH CARE EDUCATION/TRAINING PROGRAM

## 2025-05-30 PROCEDURE — 88342 IMHCHEM/IMCYTCHM 1ST ANTB: CPT | Performed by: STUDENT IN AN ORGANIZED HEALTH CARE EDUCATION/TRAINING PROGRAM

## 2025-05-30 PROCEDURE — 88305 TISSUE EXAM BY PATHOLOGIST: CPT | Performed by: STUDENT IN AN ORGANIZED HEALTH CARE EDUCATION/TRAINING PROGRAM

## 2025-06-04 ENCOUNTER — RESULTS FOLLOW-UP (OUTPATIENT)
Dept: MULTI SPECIALTY CLINIC | Facility: CLINIC | Age: 65
End: 2025-06-04

## 2025-06-04 NOTE — RESULT ENCOUNTER NOTE
Called patient about biopsy results. Pt understands. Plan is cryotherapy followed by imiquimod. Patient knows if does not hear from our office in 2 weeks, he has to call office. Pt understands.    CLINICAL DERM TEAM: Please schedule patient with me (can be overbook) for cryotherapy. Thank you.

## 2025-06-04 NOTE — TELEPHONE ENCOUNTER
Patient has an appt scheduled for 8/6/25 with RADHA Dow. Will send a message to Dr Lr if this is an appropriate time frame.

## 2025-06-04 NOTE — TELEPHONE ENCOUNTER
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

## 2025-06-23 ENCOUNTER — CLINICAL SUPPORT (OUTPATIENT)
Dept: CARDIOLOGY CLINIC | Facility: CLINIC | Age: 65
End: 2025-06-23
Payer: COMMERCIAL

## 2025-06-23 ENCOUNTER — APPOINTMENT (OUTPATIENT)
Dept: LAB | Facility: CLINIC | Age: 65
End: 2025-06-23
Attending: INTERNAL MEDICINE
Payer: COMMERCIAL

## 2025-06-23 VITALS
SYSTOLIC BLOOD PRESSURE: 128 MMHG | DIASTOLIC BLOOD PRESSURE: 74 MMHG | WEIGHT: 187 LBS | BODY MASS INDEX: 25.36 KG/M2 | OXYGEN SATURATION: 98 %

## 2025-06-23 DIAGNOSIS — I48.0 PAF (PAROXYSMAL ATRIAL FIBRILLATION) (HCC): Primary | ICD-10-CM

## 2025-06-23 DIAGNOSIS — I63.132 CEREBROVASCULAR ACCIDENT (CVA) DUE TO EMBOLISM OF LEFT CAROTID ARTERY (HCC): ICD-10-CM

## 2025-06-23 DIAGNOSIS — I10 BENIGN ESSENTIAL HYPERTENSION: ICD-10-CM

## 2025-06-23 LAB
ANION GAP SERPL CALCULATED.3IONS-SCNC: 5 MMOL/L (ref 4–13)
BUN SERPL-MCNC: 16 MG/DL (ref 5–25)
CALCIUM SERPL-MCNC: 8.5 MG/DL (ref 8.4–10.2)
CHLORIDE SERPL-SCNC: 106 MMOL/L (ref 96–108)
CHOLEST SERPL-MCNC: 143 MG/DL (ref ?–200)
CO2 SERPL-SCNC: 27 MMOL/L (ref 21–32)
CREAT SERPL-MCNC: 1.21 MG/DL (ref 0.6–1.3)
GFR SERPL CREATININE-BSD FRML MDRD: 63 ML/MIN/1.73SQ M
GLUCOSE P FAST SERPL-MCNC: 102 MG/DL (ref 65–99)
HDLC SERPL-MCNC: 35 MG/DL
LDLC SERPL CALC-MCNC: 92 MG/DL (ref 0–100)
NONHDLC SERPL-MCNC: 108 MG/DL
POTASSIUM SERPL-SCNC: 3.9 MMOL/L (ref 3.5–5.3)
SODIUM SERPL-SCNC: 138 MMOL/L (ref 135–147)
TRIGL SERPL-MCNC: 79 MG/DL (ref ?–150)

## 2025-06-23 PROCEDURE — 93000 ELECTROCARDIOGRAM COMPLETE: CPT

## 2025-06-23 PROCEDURE — 80048 BASIC METABOLIC PNL TOTAL CA: CPT

## 2025-06-23 PROCEDURE — 80061 LIPID PANEL: CPT

## 2025-06-23 PROCEDURE — 36415 COLL VENOUS BLD VENIPUNCTURE: CPT

## 2025-06-23 PROCEDURE — RECHECK

## 2025-06-23 PROCEDURE — 99211 OFF/OP EST MAY X REQ PHY/QHP: CPT

## 2025-06-23 NOTE — PROGRESS NOTES
Pt presents in office per Dr Casanova for a nurse visit with EKG.     Dr Trinh read the EKG. EKG scanned into pt chart.

## 2025-07-22 DIAGNOSIS — I10 ESSENTIAL HYPERTENSION: ICD-10-CM

## 2025-07-28 RX ORDER — METOPROLOL SUCCINATE 25 MG/1
12.5 TABLET, EXTENDED RELEASE ORAL DAILY
Qty: 45 TABLET | Refills: 3 | Status: SHIPPED | OUTPATIENT
Start: 2025-07-28

## 2025-08-06 ENCOUNTER — OFFICE VISIT (OUTPATIENT)
Dept: DERMATOLOGY | Facility: CLINIC | Age: 65
End: 2025-08-06

## 2025-08-06 VITALS — BODY MASS INDEX: 24.41 KG/M2 | TEMPERATURE: 98.4 F | WEIGHT: 180 LBS

## 2025-08-06 DIAGNOSIS — L81.4 LENTIGINES: ICD-10-CM

## 2025-08-06 DIAGNOSIS — Z12.83 SKIN EXAM, SCREENING FOR CANCER: Primary | ICD-10-CM

## 2025-08-06 DIAGNOSIS — D18.01 CHERRY ANGIOMA: ICD-10-CM

## 2025-08-06 DIAGNOSIS — L82.1 SEBORRHEIC KERATOSIS: ICD-10-CM

## 2025-08-06 DIAGNOSIS — D09.9 SQUAMOUS CELL CARCINOMA IN SITU (SCCIS): ICD-10-CM

## 2025-08-06 DIAGNOSIS — D22.9 MULTIPLE MELANOCYTIC NEVI: ICD-10-CM

## 2025-08-06 RX ORDER — IMIQUIMOD 12.5 MG/.25G
CREAM TOPICAL
Qty: 12 EACH | Refills: 1 | Status: SHIPPED | OUTPATIENT
Start: 2025-08-06